# Patient Record
Sex: FEMALE | Race: WHITE | NOT HISPANIC OR LATINO | Employment: FULL TIME | URBAN - METROPOLITAN AREA
[De-identification: names, ages, dates, MRNs, and addresses within clinical notes are randomized per-mention and may not be internally consistent; named-entity substitution may affect disease eponyms.]

---

## 2019-03-20 ENCOUNTER — OFFICE VISIT (OUTPATIENT)
Dept: OBSTETRICS AND GYNECOLOGY | Facility: CLINIC | Age: 49
End: 2019-03-20
Attending: OBSTETRICS & GYNECOLOGY
Payer: COMMERCIAL

## 2019-03-20 VITALS
WEIGHT: 165.13 LBS | SYSTOLIC BLOOD PRESSURE: 112 MMHG | HEIGHT: 63 IN | DIASTOLIC BLOOD PRESSURE: 72 MMHG | BODY MASS INDEX: 29.26 KG/M2

## 2019-03-20 DIAGNOSIS — Z01.419 ENCOUNTER FOR GYNECOLOGICAL EXAMINATION WITHOUT ABNORMAL FINDING: ICD-10-CM

## 2019-03-20 DIAGNOSIS — Z12.4 SCREENING FOR MALIGNANT NEOPLASM OF CERVIX: Primary | ICD-10-CM

## 2019-03-20 PROCEDURE — 88141 LIQUID-BASED PAP SMEAR, SCREENING: ICD-10-PCS | Mod: ,,, | Performed by: PATHOLOGY

## 2019-03-20 PROCEDURE — 99386 PREV VISIT NEW AGE 40-64: CPT | Mod: S$GLB,,, | Performed by: OBSTETRICS & GYNECOLOGY

## 2019-03-20 PROCEDURE — 88175 CYTOPATH C/V AUTO FLUID REDO: CPT | Performed by: PATHOLOGY

## 2019-03-20 PROCEDURE — 87624 HPV HI-RISK TYP POOLED RSLT: CPT

## 2019-03-20 PROCEDURE — 99386 PR PREVENTIVE VISIT,NEW,40-64: ICD-10-PCS | Mod: S$GLB,,, | Performed by: OBSTETRICS & GYNECOLOGY

## 2019-03-20 PROCEDURE — 88141 CYTOPATH C/V INTERPRET: CPT | Mod: ,,, | Performed by: PATHOLOGY

## 2019-03-20 RX ORDER — NAPROXEN SODIUM 220 MG/1
81 TABLET, FILM COATED ORAL DAILY
COMMUNITY
End: 2022-05-11

## 2019-03-20 NOTE — PROGRESS NOTES
SUBJECTIVE:   49 y.o. female   for annual routine Pap and checkup. Patient's last menstrual period was 2019..  She has no unusual complaints and reports doing well since having 3 laser treatments. She is having regular cycles. She reports no hot flashes, no night sweats. .        Past Medical History:   Diagnosis Date    Abnormal Pap smear of cervix      Past Surgical History:   Procedure Laterality Date    COSMETIC SURGERY       Social History     Socioeconomic History    Marital status:      Spouse name: Not on file    Number of children: Not on file    Years of education: Not on file    Highest education level: Not on file   Social Needs    Financial resource strain: Not on file    Food insecurity - worry: Not on file    Food insecurity - inability: Not on file    Transportation needs - medical: Not on file    Transportation needs - non-medical: Not on file   Occupational History    Not on file   Tobacco Use    Smoking status: Never Smoker    Smokeless tobacco: Never Used   Substance and Sexual Activity    Alcohol use: Yes    Drug use: No    Sexual activity: Yes     Partners: Male     Birth control/protection: None   Other Topics Concern    Not on file   Social History Narrative    Not on file     Family History   Problem Relation Age of Onset    Ovarian cancer Maternal Aunt 60    Breast cancer Neg Hx     Cancer Neg Hx     Colon cancer Neg Hx      OB History    Para Term  AB Living   1 1           SAB TAB Ectopic Multiple Live Births                  # Outcome Date GA Lbr Nima/2nd Weight Sex Delivery Anes PTL Lv   1 Para                       Current Outpatient Medications   Medication Sig Dispense Refill    aspirin 81 MG Chew Take 81 mg by mouth once daily.       No current facility-administered medications for this visit.      Allergies: Patient has no known allergies.     The ASCVD Risk score (Ancelmoshara MOBLEY Jr., et al., 2013) failed to calculate for the following  reasons:    Cannot find a previous HDL lab    Cannot find a previous total cholesterol lab      ROS:  Constitutional: no weight loss, weight gain, fever, fatigue  Eyes:  No vision changes, glasses/contacts  ENT/Mouth: No ulcers, sinus problems, ears ringing, headache  Cardiovascular: No inability to lie flat, chest pain, exercise intolerance, swelling, heart palpitations  Respiratory: No wheezing, coughing blood, shortness of breath, or cough  Gastrointestinal: No diarrhea, bloody stool, nausea/vomiting, constipation, gas, hemorrhoids  Genitourinary: No blood in urine, painful urination, urgency of urination, frequency of urination, incomplete emptying, incontinence, abnormal bleeding, painful periods, heavy periods, vaginal discharge, vaginal odor, painful intercourse, sexual problems, bleeding after intercourse.  Musculoskeletal: No muscle weakness  Skin/Breast: No painful breasts, nipple discharge, masses, rash, ulcers  Neurological: No passing out, seizures, numbness, headache  Endocrine: No diabetes, hypothyroid, hyperthyroid, hot flashes, hair loss, abnormal hair growth, acne  Psychiatric: No depression, crying  Hematologic: No bruises, bleeding, swollen lymph nodes, anemia.      Physical Exam:   Constitutional: She is oriented to person, place, and time. She appears well-developed and well-nourished.      Neck: Normal range of motion. No tracheal deviation present. No thyromegaly present.    Cardiovascular: Exam reveals no edema.     Pulmonary/Chest: Effort normal. She exhibits no mass, no tenderness, no deformity and no retraction. Right breast exhibits no inverted nipple, no mass, no nipple discharge, no skin change, no tenderness, presence, no bleeding and no swelling. Left breast exhibits no inverted nipple, no mass, no nipple discharge, no skin change, no tenderness, presence, no bleeding and no swelling. Breasts are symmetrical.        Abdominal: Soft. She exhibits no distension and no mass. There is no  tenderness. There is no rebound and no guarding. No hernia. Hernia confirmed negative in the left inguinal area.     Genitourinary: Vagina normal and uterus normal. Rectal exam shows no external hemorrhoid. There is no rash, tenderness or lesion on the right labia. There is no rash, tenderness or lesion on the left labia. Uterus is not deviated. Cervix is normal. No no adexnal prolapse. Right adnexum displays no mass, no tenderness and no fullness. Left adnexum displays no mass, no tenderness and no fullness. No tenderness, bleeding, rectocele, cystocele or unspecified prolapse of vaginal walls in the vagina. No vaginal discharge found. Cervix exhibits no motion tenderness, no discharge and no friability.           Musculoskeletal: Normal range of motion and moves all extremeties. She exhibits no edema.      Lymphadenopathy:        Right: No inguinal adenopathy present.        Left: No inguinal adenopathy present.    Neurological: She is alert and oriented to person, place, and time.    Skin: No rash noted. No erythema. No pallor.    Psychiatric: She has a normal mood and affect. Her behavior is normal. Judgment and thought content normal.         ASSESSMENT:   well woman    PLAN:   mammogram  pap smear  return annually or prn

## 2019-03-25 LAB
HPV HR 12 DNA CVX QL NAA+PROBE: POSITIVE
HPV16 AG SPEC QL: POSITIVE
HPV18 DNA SPEC QL NAA+PROBE: NEGATIVE

## 2019-04-08 ENCOUNTER — PROCEDURE VISIT (OUTPATIENT)
Dept: OBSTETRICS AND GYNECOLOGY | Facility: CLINIC | Age: 49
End: 2019-04-08
Attending: OBSTETRICS & GYNECOLOGY
Payer: COMMERCIAL

## 2019-04-08 VITALS
SYSTOLIC BLOOD PRESSURE: 124 MMHG | DIASTOLIC BLOOD PRESSURE: 64 MMHG | WEIGHT: 164.69 LBS | BODY MASS INDEX: 29.18 KG/M2 | HEIGHT: 63 IN

## 2019-04-08 DIAGNOSIS — R87.810 ASCUS WITH POSITIVE HIGH RISK HPV CERVICAL: Primary | ICD-10-CM

## 2019-04-08 DIAGNOSIS — R87.610 ASCUS WITH POSITIVE HIGH RISK HPV CERVICAL: Primary | ICD-10-CM

## 2019-04-08 PROCEDURE — 57454 PR COLPOSC,CERVIX W/ADJ VAG,W/BX & CURRETAG: ICD-10-PCS | Mod: S$GLB,,, | Performed by: OBSTETRICS & GYNECOLOGY

## 2019-04-08 PROCEDURE — 88305 TISSUE SPECIMEN TO PATHOLOGY, OBSTETRICS/GYNECOLOGY: ICD-10-PCS | Mod: 26,,, | Performed by: PATHOLOGY

## 2019-04-08 PROCEDURE — 88305 TISSUE EXAM BY PATHOLOGIST: CPT | Mod: 26,,, | Performed by: PATHOLOGY

## 2019-04-08 PROCEDURE — 57454 BX/CURETT OF CERVIX W/SCOPE: CPT | Mod: S$GLB,,, | Performed by: OBSTETRICS & GYNECOLOGY

## 2019-04-08 PROCEDURE — 88305 TISSUE EXAM BY PATHOLOGIST: CPT | Performed by: PATHOLOGY

## 2019-04-09 NOTE — PROCEDURES
Colposcopy W/BIOPSY AND ECC- Today  Date/Time: 2019 11:00 AM  Performed by: Rachel Abad MD  Authorized by: Rachel Abad MD   Preparation: Patient was prepped and draped in the usual sterile fashion.  Local anesthesia used: no    Anesthesia:  Local anesthesia used: no    Sedation:  Patient sedated: no    Patient tolerance: Patient tolerated the procedure well with no immediate complications  Comments: COLPOSCOPY:    Maria Dolores Tamayo is a 49 y.o. female   presents for colposcopy.  No LMP recorded..  Her most recent pap smear shows atypical squamous cellularity of undetermined significance (ASCUS).  +HR HPV    The abnormal test findings were discussed, as well as HPV infection, need for colposcopy and possible biopsies to determine the plan of care, treatments available, the minimal risk of bleeding and infection with colposcopy, and alternatives to colposcopy and she agrees to proceed.      UPT is negative    COLPOSCOPY EXAM:   TIME OUT PERFORMED.     acetowhite lesion(s) noted at 1 o'clock    Biopsy was taken at 1 o'clock.  ECC was performed    Hemostasis was adequate with application of Monsel's solution.  The speculum was removed.  The patient did tolerate the procedure well.    All collected specimens sent to pathology for histologic analysis.    Post-colposcopy counseling:  The patient was instructed to manage post-colposcopy cramping with NSAIDs or Tylenol, or with a prescription per the medication card.  Avoid intercourse, douching, or tampons in the vagina for at least 2-3 days.  Expect a clumpy blackish discharge due to Monsel's solution application for several days.  Report heavy bleeding, worsening pain or pain that does not respond to above medications, or foul-smelling vaginal discharge. HPV vaccine recommended according to FDA age guidelines.  Importance of follow-up stressed.      Follow up based on colposcopy results.

## 2020-12-14 ENCOUNTER — OFFICE VISIT (OUTPATIENT)
Dept: OBSTETRICS AND GYNECOLOGY | Facility: CLINIC | Age: 50
End: 2020-12-14
Attending: OBSTETRICS & GYNECOLOGY
Payer: COMMERCIAL

## 2020-12-14 VITALS
DIASTOLIC BLOOD PRESSURE: 82 MMHG | HEIGHT: 63 IN | BODY MASS INDEX: 30.28 KG/M2 | WEIGHT: 170.88 LBS | SYSTOLIC BLOOD PRESSURE: 118 MMHG

## 2020-12-14 DIAGNOSIS — Z01.419 ENCOUNTER FOR GYNECOLOGICAL EXAMINATION WITHOUT ABNORMAL FINDING: ICD-10-CM

## 2020-12-14 DIAGNOSIS — Z12.31 ENCOUNTER FOR SCREENING MAMMOGRAM FOR MALIGNANT NEOPLASM OF BREAST: Primary | ICD-10-CM

## 2020-12-14 DIAGNOSIS — Z01.419 PAP SMEAR, AS PART OF ROUTINE GYNECOLOGICAL EXAMINATION: ICD-10-CM

## 2020-12-14 PROCEDURE — 1126F PR PAIN SEVERITY QUANTIFIED, NO PAIN PRESENT: ICD-10-PCS | Mod: S$GLB,,, | Performed by: OBSTETRICS & GYNECOLOGY

## 2020-12-14 PROCEDURE — 88175 CYTOPATH C/V AUTO FLUID REDO: CPT | Performed by: PATHOLOGY

## 2020-12-14 PROCEDURE — 88141 CYTOPATH C/V INTERPRET: CPT | Mod: ,,, | Performed by: PATHOLOGY

## 2020-12-14 PROCEDURE — 3008F PR BODY MASS INDEX (BMI) DOCUMENTED: ICD-10-PCS | Mod: CPTII,S$GLB,, | Performed by: OBSTETRICS & GYNECOLOGY

## 2020-12-14 PROCEDURE — 88141 PR  CYTOPATH CERV/VAG INTERPRET: ICD-10-PCS | Mod: ,,, | Performed by: PATHOLOGY

## 2020-12-14 PROCEDURE — 99396 PR PREVENTIVE VISIT,EST,40-64: ICD-10-PCS | Mod: S$GLB,,, | Performed by: OBSTETRICS & GYNECOLOGY

## 2020-12-14 PROCEDURE — 1126F AMNT PAIN NOTED NONE PRSNT: CPT | Mod: S$GLB,,, | Performed by: OBSTETRICS & GYNECOLOGY

## 2020-12-14 PROCEDURE — 99396 PREV VISIT EST AGE 40-64: CPT | Mod: S$GLB,,, | Performed by: OBSTETRICS & GYNECOLOGY

## 2020-12-14 PROCEDURE — 3008F BODY MASS INDEX DOCD: CPT | Mod: CPTII,S$GLB,, | Performed by: OBSTETRICS & GYNECOLOGY

## 2020-12-14 PROCEDURE — 87624 HPV HI-RISK TYP POOLED RSLT: CPT

## 2020-12-14 RX ORDER — SOTALOL HYDROCHLORIDE 80 MG/1
80 TABLET ORAL 2 TIMES DAILY
Status: ON HOLD | COMMUNITY
End: 2022-04-25 | Stop reason: HOSPADM

## 2020-12-14 NOTE — PROGRESS NOTES
SUBJECTIVE:   50 y.o. female   for annual routine Pap and checkup. No LMP recorded..  She has no unusual complaints.    She is seeing Cardiology for her A fib    Past Medical History:   Diagnosis Date    Abnormal Pap smear of cervix      Past Surgical History:   Procedure Laterality Date    COSMETIC SURGERY       Social History     Socioeconomic History    Marital status:      Spouse name: Not on file    Number of children: Not on file    Years of education: Not on file    Highest education level: Not on file   Occupational History    Not on file   Social Needs    Financial resource strain: Not on file    Food insecurity     Worry: Not on file     Inability: Not on file    Transportation needs     Medical: Not on file     Non-medical: Not on file   Tobacco Use    Smoking status: Never Smoker    Smokeless tobacco: Never Used   Substance and Sexual Activity    Alcohol use: Yes    Drug use: No    Sexual activity: Yes     Partners: Male     Birth control/protection: None   Lifestyle    Physical activity     Days per week: Not on file     Minutes per session: Not on file    Stress: Not on file   Relationships    Social connections     Talks on phone: Not on file     Gets together: Not on file     Attends Protestant service: Not on file     Active member of club or organization: Not on file     Attends meetings of clubs or organizations: Not on file     Relationship status: Not on file   Other Topics Concern    Not on file   Social History Narrative    Not on file     Family History   Problem Relation Age of Onset    Ovarian cancer Maternal Aunt 60    Breast cancer Neg Hx     Cancer Neg Hx     Colon cancer Neg Hx      OB History    Para Term  AB Living   1 1           SAB TAB Ectopic Multiple Live Births                  # Outcome Date GA Lbr Nima/2nd Weight Sex Delivery Anes PTL Lv   1 Para                    Current Outpatient Medications   Medication Sig Dispense Refill     sotalol (BETAPACE) 40 MG tablet Take 80 mg by mouth 2 (two) times daily.      aspirin 81 MG Chew Take 81 mg by mouth once daily.       No current facility-administered medications for this visit.      Allergies: Patient has no known allergies.     The ASCVD Risk score ([place] ITZ Colindres., et al., 2013) failed to calculate for the following reasons:    Cannot find a previous HDL lab    Cannot find a previous total cholesterol lab      ROS:  Constitutional: no weight loss, weight gain, fever, fatigue  Eyes:  No vision changes, glasses/contacts  ENT/Mouth: No ulcers, sinus problems, ears ringing, headache  Cardiovascular: No inability to lie flat, chest pain, exercise intolerance, swelling, heart palpitations  Respiratory: No wheezing, coughing blood, shortness of breath, or cough  Gastrointestinal: No diarrhea, bloody stool, nausea/vomiting, constipation, gas, hemorrhoids  Genitourinary: No blood in urine, painful urination, urgency of urination, frequency of urination, incomplete emptying, incontinence, abnormal bleeding, painful periods, heavy periods, vaginal discharge, vaginal odor, painful intercourse, sexual problems, bleeding after intercourse.  Musculoskeletal: No muscle weakness  Skin/Breast: No painful breasts, nipple discharge, masses, rash, ulcers  Neurological: No passing out, seizures, numbness, headache  Endocrine: No diabetes, hypothyroid, hyperthyroid, hot flashes, hair loss, abnormal hair growth, acne  Psychiatric: No depression, crying  Hematologic: No bruises, bleeding, swollen lymph nodes, anemia.      Physical Exam:   Constitutional: She is oriented to person, place, and time. She appears well-developed and well-nourished.      Neck: Normal range of motion. No tracheal deviation present. No thyromegaly present.    Cardiovascular: Exam reveals no edema.     Pulmonary/Chest: Effort normal. She exhibits no mass, no tenderness, no deformity and no retraction. Right breast exhibits no inverted nipple,  no mass, no nipple discharge, no skin change, no tenderness, presence, no bleeding and no swelling. Left breast exhibits no inverted nipple, no mass, no nipple discharge, no skin change, no tenderness, presence, no bleeding and no swelling. Breasts are symmetrical.        Abdominal: Soft. She exhibits no distension and no mass. There is no abdominal tenderness. There is no rebound and no guarding. No hernia. Hernia confirmed negative in the left inguinal area.     Genitourinary:    Vagina and uterus normal.   Rectum:      No external hemorrhoid.   There is no rash, tenderness or lesion on the right labia. There is no rash, tenderness or lesion on the left labia. Uterus is not deviated. Cervix is normal. No no adexnal prolapse. Right adnexum displays no mass, no tenderness and no fullness. Left adnexum displays no mass, no tenderness and no fullness. No tenderness, bleeding, rectocele, cystocele or unspecified prolapse of vaginal walls in the vagina. Cervix exhibits no motion tenderness, no discharge and no friability. negative for vaginal discharge          Musculoskeletal: Normal range of motion and moves all extremeties. No edema.       Neurological: She is alert and oriented to person, place, and time.    Skin: No rash noted. No erythema. No pallor.    Psychiatric: She has a normal mood and affect. Her behavior is normal. Judgment and thought content normal.         ASSESSMENT:   well woman  History of abnormal pap  PLAN:   Mammogram- to be done in [place]  pap smear  return annually or prn

## 2020-12-18 LAB
HPV HR 12 DNA SPEC QL NAA+PROBE: POSITIVE
HPV16 AG SPEC QL: POSITIVE
HPV18 DNA SPEC QL NAA+PROBE: NEGATIVE

## 2021-01-25 LAB
FINAL PATHOLOGIC DIAGNOSIS: ABNORMAL
Lab: ABNORMAL

## 2021-01-28 ENCOUNTER — PATIENT MESSAGE (OUTPATIENT)
Dept: OBSTETRICS AND GYNECOLOGY | Facility: CLINIC | Age: 51
End: 2021-01-28

## 2021-01-28 ENCOUNTER — PROCEDURE VISIT (OUTPATIENT)
Dept: OBSTETRICS AND GYNECOLOGY | Facility: CLINIC | Age: 51
End: 2021-01-28
Attending: OBSTETRICS & GYNECOLOGY
Payer: COMMERCIAL

## 2021-01-28 VITALS
SYSTOLIC BLOOD PRESSURE: 102 MMHG | BODY MASS INDEX: 29.1 KG/M2 | HEIGHT: 63 IN | WEIGHT: 164.25 LBS | DIASTOLIC BLOOD PRESSURE: 68 MMHG

## 2021-01-28 DIAGNOSIS — R87.612 LOW GRADE SQUAMOUS INTRAEPITHELIAL LESION (LGSIL) ON PAPANICOLAOU SMEAR OF CERVIX: Primary | ICD-10-CM

## 2021-01-28 DIAGNOSIS — B97.7 HPV (HUMAN PAPILLOMA VIRUS) INFECTION: ICD-10-CM

## 2021-01-28 PROCEDURE — 57454 BX/CURETT OF CERVIX W/SCOPE: CPT | Mod: S$GLB,,, | Performed by: OBSTETRICS & GYNECOLOGY

## 2021-01-28 PROCEDURE — 99499 NO LOS: ICD-10-PCS | Mod: S$GLB,,, | Performed by: OBSTETRICS & GYNECOLOGY

## 2021-01-28 PROCEDURE — 88305 TISSUE EXAM BY PATHOLOGIST: ICD-10-PCS | Mod: 26,,, | Performed by: PATHOLOGY

## 2021-01-28 PROCEDURE — 88305 TISSUE EXAM BY PATHOLOGIST: CPT | Performed by: PATHOLOGY

## 2021-01-28 PROCEDURE — 57454 COLPOSCOPY: ICD-10-PCS | Mod: S$GLB,,, | Performed by: OBSTETRICS & GYNECOLOGY

## 2021-01-28 PROCEDURE — 88305 TISSUE EXAM BY PATHOLOGIST: CPT | Mod: 26,,, | Performed by: PATHOLOGY

## 2021-01-28 PROCEDURE — 99499 UNLISTED E&M SERVICE: CPT | Mod: S$GLB,,, | Performed by: OBSTETRICS & GYNECOLOGY

## 2021-02-03 LAB
FINAL PATHOLOGIC DIAGNOSIS: NORMAL
GROSS: NORMAL

## 2021-02-09 ENCOUNTER — TELEPHONE (OUTPATIENT)
Dept: GYNECOLOGIC ONCOLOGY | Facility: CLINIC | Age: 51
End: 2021-02-09

## 2021-05-12 ENCOUNTER — PATIENT MESSAGE (OUTPATIENT)
Dept: RESEARCH | Facility: HOSPITAL | Age: 51
End: 2021-05-12

## 2021-10-12 ENCOUNTER — TELEPHONE (OUTPATIENT)
Dept: OBSTETRICS AND GYNECOLOGY | Facility: CLINIC | Age: 51
End: 2021-10-12

## 2021-12-15 ENCOUNTER — OFFICE VISIT (OUTPATIENT)
Dept: OBSTETRICS AND GYNECOLOGY | Facility: CLINIC | Age: 51
End: 2021-12-15
Attending: OBSTETRICS & GYNECOLOGY
Payer: COMMERCIAL

## 2021-12-15 VITALS
HEART RATE: 83 BPM | WEIGHT: 168 LBS | HEIGHT: 63 IN | DIASTOLIC BLOOD PRESSURE: 88 MMHG | SYSTOLIC BLOOD PRESSURE: 130 MMHG | BODY MASS INDEX: 29.77 KG/M2

## 2021-12-15 DIAGNOSIS — Z01.419 ENCOUNTER FOR GYNECOLOGICAL EXAMINATION WITHOUT ABNORMAL FINDING: ICD-10-CM

## 2021-12-15 DIAGNOSIS — Z12.4 SCREENING FOR MALIGNANT NEOPLASM OF THE CERVIX: Primary | ICD-10-CM

## 2021-12-15 DIAGNOSIS — Z12.31 ENCOUNTER FOR SCREENING MAMMOGRAM FOR MALIGNANT NEOPLASM OF BREAST: ICD-10-CM

## 2021-12-15 PROCEDURE — 99396 PREV VISIT EST AGE 40-64: CPT | Mod: S$GLB,,, | Performed by: OBSTETRICS & GYNECOLOGY

## 2021-12-15 PROCEDURE — 87624 HPV HI-RISK TYP POOLED RSLT: CPT | Performed by: OBSTETRICS & GYNECOLOGY

## 2021-12-15 PROCEDURE — 99396 PR PREVENTIVE VISIT,EST,40-64: ICD-10-PCS | Mod: S$GLB,,, | Performed by: OBSTETRICS & GYNECOLOGY

## 2021-12-23 LAB
CYTOLOGIST CVX/VAG CYTO: ABNORMAL
CYTOLOGY CVX/VAG DOC CYTO: ABNORMAL
CYTOLOGY CVX/VAG DOC THIN PREP: ABNORMAL
CYTOLOGY THINPREP PAP COMMENT: ABNORMAL
DX ICD CODE: ABNORMAL
HPV HR 12 DNA CVX QL NAA+PROBE: POSITIVE
HPV16 DNA CVX QL NAA+PROBE: POSITIVE
HPV18 DNA CVX QL NAA+PROBE: NEGATIVE
PAP NOTE: ABNORMAL
PATHOLOGIST CVX/VAG CYTO: ABNORMAL
STAT OF ADQ CVX/VAG CYTO-IMP: ABNORMAL

## 2022-01-03 ENCOUNTER — TELEPHONE (OUTPATIENT)
Dept: OBSTETRICS AND GYNECOLOGY | Facility: CLINIC | Age: 52
End: 2022-01-03
Payer: COMMERCIAL

## 2022-01-03 NOTE — TELEPHONE ENCOUNTER
----- Message from Homero Logan sent at 1/3/2022 12:05 PM CST -----  Regarding: Appt  Contact: SID CASTILLO [[id number]]  Name of Who is Calling: SID CASTILLO [[id number]]      What is the request in detail: Would like to speak with staff in regards to rescheduling upcoming procedure, due to appt conflict.       Can the clinic reply by [facility]: yes      What Number to Call Back if not in [facility]: [phone]

## 2022-01-10 ENCOUNTER — HOSPITAL ENCOUNTER (OUTPATIENT)
Dept: RADIOLOGY | Facility: OTHER | Age: 52
Discharge: HOME | End: 2022-01-10
Attending: OBSTETRICS & GYNECOLOGY
Payer: COMMERCIAL

## 2022-01-10 DIAGNOSIS — Z12.31 ENCOUNTER FOR SCREENING MAMMOGRAM FOR MALIGNANT NEOPLASM OF BREAST: ICD-10-CM

## 2022-01-10 PROCEDURE — 77067 SCR MAMMO BI INCL CAD: CPT | Mod: TC

## 2022-01-10 PROCEDURE — 77063 MAMMO DIGITAL SCREENING BILAT WITH TOMO: ICD-10-PCS | Mod: 26,,, | Performed by: RADIOLOGY

## 2022-01-10 PROCEDURE — 77067 MAMMO DIGITAL SCREENING BILAT WITH TOMO: ICD-10-PCS | Mod: 26,,, | Performed by: RADIOLOGY

## 2022-01-10 PROCEDURE — 77063 BREAST TOMOSYNTHESIS BI: CPT | Mod: 26,,, | Performed by: RADIOLOGY

## 2022-01-10 PROCEDURE — 77067 SCR MAMMO BI INCL CAD: CPT | Mod: 26,,, | Performed by: RADIOLOGY

## 2022-01-17 ENCOUNTER — PATIENT MESSAGE (OUTPATIENT)
Dept: OBSTETRICS AND GYNECOLOGY | Facility: CLINIC | Age: 52
End: 2022-01-17
Payer: COMMERCIAL

## 2022-01-20 ENCOUNTER — PROCEDURE VISIT (OUTPATIENT)
Dept: OBSTETRICS AND GYNECOLOGY | Facility: CLINIC | Age: 52
End: 2022-01-20
Attending: OBSTETRICS & GYNECOLOGY
Payer: COMMERCIAL

## 2022-01-20 VITALS
SYSTOLIC BLOOD PRESSURE: 116 MMHG | DIASTOLIC BLOOD PRESSURE: 72 MMHG | HEIGHT: 63 IN | BODY MASS INDEX: 29.77 KG/M2 | WEIGHT: 168 LBS

## 2022-01-20 DIAGNOSIS — R87.810 CERVICAL HIGH RISK HUMAN PAPILLOMAVIRUS (HPV) DNA TEST POSITIVE: ICD-10-CM

## 2022-01-20 DIAGNOSIS — R87.612 LGSIL ON PAP SMEAR OF CERVIX: Primary | ICD-10-CM

## 2022-01-20 LAB
B-HCG UR QL: NEGATIVE
CTP QC/QA: YES

## 2022-01-20 PROCEDURE — 88305 TISSUE EXAM BY PATHOLOGIST: CPT | Mod: 26,,, | Performed by: PATHOLOGY

## 2022-01-20 PROCEDURE — 81025 URINE PREGNANCY TEST: CPT | Mod: S$GLB,,, | Performed by: OBSTETRICS & GYNECOLOGY

## 2022-01-20 PROCEDURE — 88305 TISSUE EXAM BY PATHOLOGIST: ICD-10-PCS | Mod: 26,,, | Performed by: PATHOLOGY

## 2022-01-20 PROCEDURE — 57454 BX/CURETT OF CERVIX W/SCOPE: CPT | Mod: S$GLB,,, | Performed by: OBSTETRICS & GYNECOLOGY

## 2022-01-20 PROCEDURE — 81025 POCT URINE PREGNANCY: ICD-10-PCS | Mod: S$GLB,,, | Performed by: OBSTETRICS & GYNECOLOGY

## 2022-01-20 PROCEDURE — 57454 COLPOSCOPY W/BIOPSY AND ECC- TODAY: ICD-10-PCS | Mod: S$GLB,,, | Performed by: OBSTETRICS & GYNECOLOGY

## 2022-01-20 PROCEDURE — 88305 TISSUE EXAM BY PATHOLOGIST: CPT | Mod: 59 | Performed by: PATHOLOGY

## 2022-01-20 NOTE — PROCEDURES
Colposcopy W/BIOPSY AND ECC- Today    Date/Time: 1/20/2022 9:00 AM  Performed by: Rachel Abad MD  Authorized by: Rachel Abad MD     Consent Done?:  Yes (Written)  Timeout:Immediately prior to procedure a time out was called to verify the correct patient, procedure, equipment, support staff and site/side marked as required  Prep:Patient was prepped and draped in the usual sterile fashion  Assistants?: No      Colposcopy Site:  Cervix  Position:  Supine  Acrowhite Lesion? Yes    Atypical Vessels: No    Transformation Zone Adequate?: No    Biopsy?: Yes         Location:  Cervix ((10 00, 11 00 and 12 00))  ECC Performed?: Yes    LEEP Performed?: No    Estimated blood loss (cc):  5   Patient tolerated the procedure well with no immediate complications.

## 2022-01-27 LAB
FINAL PATHOLOGIC DIAGNOSIS: NORMAL
Lab: NORMAL

## 2022-04-21 ENCOUNTER — HOSPITAL ENCOUNTER (OUTPATIENT)
Dept: RADIOLOGY | Facility: HOSPITAL | Age: 52
Discharge: HOME | End: 2022-04-21
Attending: FAMILY MEDICINE
Payer: COMMERCIAL

## 2022-04-21 PROCEDURE — 71045 X-RAY EXAM CHEST 1 VIEW: CPT | Mod: 26,,, | Performed by: RADIOLOGY

## 2022-04-21 PROCEDURE — 71045 XR CHEST 1 VIEW: ICD-10-PCS | Mod: 26,,, | Performed by: RADIOLOGY

## 2022-04-22 ENCOUNTER — HOSPITAL ENCOUNTER (INPATIENT)
Facility: HOSPITAL | Age: 52
LOS: 3 days | Discharge: HOME HEALTH | DRG: 872 | End: 2022-04-25
Attending: EMERGENCY MEDICINE | Admitting: INTERNAL MEDICINE
Payer: COMMERCIAL

## 2022-04-22 ENCOUNTER — TELEPHONE (OUTPATIENT)
Dept: NEUROLOGY | Facility: CLINIC | Age: 52
End: 2022-04-22
Payer: COMMERCIAL

## 2022-04-22 DIAGNOSIS — I48.91 ATRIAL FIBRILLATION WITH RAPID VENTRICULAR RESPONSE: ICD-10-CM

## 2022-04-22 DIAGNOSIS — R06.02 SHORTNESS OF BREATH: ICD-10-CM

## 2022-04-22 DIAGNOSIS — I48.91 ATRIAL FIBRILLATION WITH RVR: ICD-10-CM

## 2022-04-22 DIAGNOSIS — K57.92 DIVERTICULITIS: ICD-10-CM

## 2022-04-22 DIAGNOSIS — R00.0 TACHYCARDIA: Primary | ICD-10-CM

## 2022-04-22 PROBLEM — I50.22 CHRONIC SYSTOLIC HEART FAILURE: Status: ACTIVE | Noted: 2022-04-22

## 2022-04-22 LAB
ALBUMIN SERPL BCP-MCNC: 3.4 G/DL (ref 3.5–5.2)
ALP SERPL-CCNC: 249 U/L (ref 55–135)
ALT SERPL W/O P-5'-P-CCNC: 66 U/L (ref 10–44)
ANION GAP SERPL CALC-SCNC: 15 MMOL/L (ref 8–16)
AST SERPL-CCNC: 44 U/L (ref 10–40)
B-HCG UR QL: NEGATIVE
BASOPHILS # BLD AUTO: 0.09 K/UL (ref 0–0.2)
BASOPHILS NFR BLD: 0.3 % (ref 0–1.9)
BILIRUB SERPL-MCNC: 0.8 MG/DL (ref 0.1–1)
BILIRUB UR QL STRIP: NEGATIVE
BILIRUB UR QL STRIP: NEGATIVE
BNP SERPL-MCNC: 96 PG/ML (ref 0–99)
BUN SERPL-MCNC: 22 MG/DL (ref 6–20)
CALCIUM SERPL-MCNC: 10.4 MG/DL (ref 8.7–10.5)
CHLORIDE SERPL-SCNC: 101 MMOL/L (ref 95–110)
CLARITY UR REFRACT.AUTO: ABNORMAL
CLARITY UR REFRACT.AUTO: CLEAR
CO2 SERPL-SCNC: 20 MMOL/L (ref 23–29)
COLOR UR AUTO: YELLOW
COLOR UR AUTO: YELLOW
CREAT SERPL-MCNC: 0.8 MG/DL (ref 0.5–1.4)
CTP QC/QA: YES
DIFFERENTIAL METHOD: ABNORMAL
EOSINOPHIL # BLD AUTO: 0.1 K/UL (ref 0–0.5)
EOSINOPHIL NFR BLD: 0.4 % (ref 0–8)
ERYTHROCYTE [DISTWIDTH] IN BLOOD BY AUTOMATED COUNT: 14.6 % (ref 11.5–14.5)
EST. GFR  (AFRICAN AMERICAN): >60 ML/MIN/1.73 M^2
EST. GFR  (NON AFRICAN AMERICAN): >60 ML/MIN/1.73 M^2
GLUCOSE SERPL-MCNC: 92 MG/DL (ref 70–110)
GLUCOSE UR QL STRIP: NEGATIVE
GLUCOSE UR QL STRIP: NEGATIVE
HCT VFR BLD AUTO: 41.7 % (ref 37–48.5)
HGB BLD-MCNC: 13.4 G/DL (ref 12–16)
HGB UR QL STRIP: NEGATIVE
HGB UR QL STRIP: NEGATIVE
IMM GRANULOCYTES # BLD AUTO: 0.26 K/UL (ref 0–0.04)
IMM GRANULOCYTES NFR BLD AUTO: 1 % (ref 0–0.5)
KETONES UR QL STRIP: ABNORMAL
KETONES UR QL STRIP: ABNORMAL
LACTATE SERPL-SCNC: 0.9 MMOL/L (ref 0.5–2.2)
LEUKOCYTE ESTERASE UR QL STRIP: NEGATIVE
LEUKOCYTE ESTERASE UR QL STRIP: NEGATIVE
LYMPHOCYTES # BLD AUTO: 1.7 K/UL (ref 1–4.8)
LYMPHOCYTES NFR BLD: 6.5 % (ref 18–48)
MAGNESIUM SERPL-MCNC: 2 MG/DL (ref 1.6–2.6)
MCH RBC QN AUTO: 28.5 PG (ref 27–31)
MCHC RBC AUTO-ENTMCNC: 32.1 G/DL (ref 32–36)
MCV RBC AUTO: 89 FL (ref 82–98)
MONOCYTES # BLD AUTO: 1.7 K/UL (ref 0.3–1)
MONOCYTES NFR BLD: 6.6 % (ref 4–15)
NEUTROPHILS # BLD AUTO: 22.4 K/UL (ref 1.8–7.7)
NEUTROPHILS NFR BLD: 85.2 % (ref 38–73)
NITRITE UR QL STRIP: NEGATIVE
NITRITE UR QL STRIP: NEGATIVE
NRBC BLD-RTO: 0 /100 WBC
PH UR STRIP: 5 [PH] (ref 5–8)
PH UR STRIP: 5 [PH] (ref 5–8)
PHOSPHATE SERPL-MCNC: 3.3 MG/DL (ref 2.7–4.5)
PLATELET # BLD AUTO: 383 K/UL (ref 150–450)
PMV BLD AUTO: 12.4 FL (ref 9.2–12.9)
POC MOLECULAR INFLUENZA A AGN: NEGATIVE
POC MOLECULAR INFLUENZA B AGN: NEGATIVE
POTASSIUM SERPL-SCNC: 4.4 MMOL/L (ref 3.5–5.1)
PROT SERPL-MCNC: 8.4 G/DL (ref 6–8.4)
PROT UR QL STRIP: NEGATIVE
PROT UR QL STRIP: NEGATIVE
RBC # BLD AUTO: 4.7 M/UL (ref 4–5.4)
SARS-COV-2 RDRP RESP QL NAA+PROBE: NEGATIVE
SARS-COV-2 RDRP RESP QL NAA+PROBE: NEGATIVE
SODIUM SERPL-SCNC: 136 MMOL/L (ref 136–145)
SP GR UR STRIP: 1.02 (ref 1–1.03)
SP GR UR STRIP: 1.02 (ref 1–1.03)
TROPONIN I SERPL DL<=0.01 NG/ML-MCNC: 0.03 NG/ML (ref 0–0.03)
TROPONIN I SERPL DL<=0.01 NG/ML-MCNC: 0.04 NG/ML (ref 0–0.03)
TSH SERPL DL<=0.005 MIU/L-ACNC: 2.02 UIU/ML (ref 0.4–4)
URN SPEC COLLECT METH UR: ABNORMAL
URN SPEC COLLECT METH UR: ABNORMAL
WBC # BLD AUTO: 26.26 K/UL (ref 3.9–12.7)

## 2022-04-22 PROCEDURE — 25000003 PHARM REV CODE 250: Performed by: INTERNAL MEDICINE

## 2022-04-22 PROCEDURE — 83735 ASSAY OF MAGNESIUM: CPT | Performed by: INTERNAL MEDICINE

## 2022-04-22 PROCEDURE — 99233 SBSQ HOSP IP/OBS HIGH 50: CPT | Mod: ,,, | Performed by: PHYSICAL MEDICINE & REHABILITATION

## 2022-04-22 PROCEDURE — 99233 PR SUBSEQUENT HOSPITAL CARE,LEVL III: ICD-10-PCS | Mod: ,,, | Performed by: NURSE PRACTITIONER

## 2022-04-22 PROCEDURE — 87502 INFLUENZA DNA AMP PROBE: CPT

## 2022-04-22 PROCEDURE — 81003 URINALYSIS AUTO W/O SCOPE: CPT | Mod: 91 | Performed by: INTERNAL MEDICINE

## 2022-04-22 PROCEDURE — U0002 COVID-19 LAB TEST NON-CDC: HCPCS | Performed by: INTERNAL MEDICINE

## 2022-04-22 PROCEDURE — 20000000 HC ICU ROOM

## 2022-04-22 PROCEDURE — 87040 BLOOD CULTURE FOR BACTERIA: CPT | Performed by: INTERNAL MEDICINE

## 2022-04-22 PROCEDURE — 84100 ASSAY OF PHOSPHORUS: CPT | Performed by: INTERNAL MEDICINE

## 2022-04-22 PROCEDURE — 96375 TX/PRO/DX INJ NEW DRUG ADDON: CPT

## 2022-04-22 PROCEDURE — 63600175 PHARM REV CODE 636 W HCPCS: Performed by: INTERNAL MEDICINE

## 2022-04-22 PROCEDURE — 93010 ELECTROCARDIOGRAM REPORT: CPT | Mod: ,,, | Performed by: INTERNAL MEDICINE

## 2022-04-22 PROCEDURE — 96365 THER/PROPH/DIAG IV INF INIT: CPT

## 2022-04-22 PROCEDURE — 99233 PR SUBSEQUENT HOSPITAL CARE,LEVL III: ICD-10-PCS | Mod: ,,, | Performed by: PHYSICAL MEDICINE & REHABILITATION

## 2022-04-22 PROCEDURE — 99291 CRITICAL CARE FIRST HOUR: CPT | Mod: 25

## 2022-04-22 PROCEDURE — 81025 URINE PREGNANCY TEST: CPT | Performed by: INTERNAL MEDICINE

## 2022-04-22 PROCEDURE — 99291 CRITICAL CARE FIRST HOUR: CPT | Mod: CS,,, | Performed by: EMERGENCY MEDICINE

## 2022-04-22 PROCEDURE — 83605 ASSAY OF LACTIC ACID: CPT | Performed by: INTERNAL MEDICINE

## 2022-04-22 PROCEDURE — 84443 ASSAY THYROID STIM HORMONE: CPT | Performed by: INTERNAL MEDICINE

## 2022-04-22 PROCEDURE — 80053 COMPREHEN METABOLIC PANEL: CPT | Performed by: INTERNAL MEDICINE

## 2022-04-22 PROCEDURE — 96361 HYDRATE IV INFUSION ADD-ON: CPT

## 2022-04-22 PROCEDURE — 25500020 PHARM REV CODE 255: Performed by: EMERGENCY MEDICINE

## 2022-04-22 PROCEDURE — 99291 PR CRITICAL CARE, E/M 30-74 MINUTES: ICD-10-PCS | Mod: CS,,, | Performed by: EMERGENCY MEDICINE

## 2022-04-22 PROCEDURE — 99233 SBSQ HOSP IP/OBS HIGH 50: CPT | Mod: ,,, | Performed by: NURSE PRACTITIONER

## 2022-04-22 PROCEDURE — 83880 ASSAY OF NATRIURETIC PEPTIDE: CPT | Performed by: INTERNAL MEDICINE

## 2022-04-22 PROCEDURE — 85025 COMPLETE CBC W/AUTO DIFF WBC: CPT | Performed by: INTERNAL MEDICINE

## 2022-04-22 PROCEDURE — 93005 ELECTROCARDIOGRAM TRACING: CPT

## 2022-04-22 PROCEDURE — 84484 ASSAY OF TROPONIN QUANT: CPT | Mod: 91 | Performed by: INTERNAL MEDICINE

## 2022-04-22 PROCEDURE — 93010 EKG 12-LEAD: ICD-10-PCS | Mod: ,,, | Performed by: INTERNAL MEDICINE

## 2022-04-22 PROCEDURE — 25000003 PHARM REV CODE 250

## 2022-04-22 RX ORDER — SODIUM CHLORIDE 0.9 % (FLUSH) 0.9 %
10 SYRINGE (ML) INJECTION
Status: DISCONTINUED | OUTPATIENT
Start: 2022-04-22 | End: 2022-04-25 | Stop reason: HOSPADM

## 2022-04-22 RX ORDER — METOPROLOL TARTRATE 1 MG/ML
INJECTION, SOLUTION INTRAVENOUS
Status: COMPLETED
Start: 2022-04-22 | End: 2022-04-22

## 2022-04-22 RX ORDER — NAPROXEN SODIUM 220 MG/1
81 TABLET, FILM COATED ORAL DAILY
Status: DISCONTINUED | OUTPATIENT
Start: 2022-04-23 | End: 2022-04-23

## 2022-04-22 RX ORDER — VANCOMYCIN HCL IN 5 % DEXTROSE 1G/250ML
15 PLASTIC BAG, INJECTION (ML) INTRAVENOUS
Status: DISCONTINUED | OUTPATIENT
Start: 2022-04-23 | End: 2022-04-23

## 2022-04-22 RX ORDER — ACETAMINOPHEN 325 MG/1
650 TABLET ORAL EVERY 6 HOURS PRN
Status: DISCONTINUED | OUTPATIENT
Start: 2022-04-23 | End: 2022-04-25 | Stop reason: HOSPADM

## 2022-04-22 RX ORDER — METOPROLOL TARTRATE 1 MG/ML
5 INJECTION, SOLUTION INTRAVENOUS EVERY 5 MIN PRN
Status: COMPLETED | OUTPATIENT
Start: 2022-04-22 | End: 2022-04-22

## 2022-04-22 RX ORDER — MORPHINE SULFATE 2 MG/ML
2 INJECTION, SOLUTION INTRAMUSCULAR; INTRAVENOUS EVERY 4 HOURS PRN
Status: DISCONTINUED | OUTPATIENT
Start: 2022-04-23 | End: 2022-04-25 | Stop reason: HOSPADM

## 2022-04-22 RX ADMIN — METOROPROLOL TARTRATE 5 MG: 5 INJECTION, SOLUTION INTRAVENOUS at 05:04

## 2022-04-22 RX ADMIN — PIPERACILLIN SODIUM AND TAZOBACTAM SODIUM 4.5 G: 4; .5 INJECTION, POWDER, FOR SOLUTION INTRAVENOUS at 06:04

## 2022-04-22 RX ADMIN — IOHEXOL 100 ML: 350 INJECTION, SOLUTION INTRAVENOUS at 08:04

## 2022-04-22 RX ADMIN — SODIUM CHLORIDE 250 ML: 0.9 INJECTION, SOLUTION INTRAVENOUS at 05:04

## 2022-04-22 RX ADMIN — SODIUM CHLORIDE 250 ML: 0.9 INJECTION, SOLUTION INTRAVENOUS at 08:04

## 2022-04-22 NOTE — HPI
Maria Dolores Tamayo is a 52 y.o. female with paroxsymal AF (diagnosed 13 years ago) along with HF with recovered EF who had a recent admission for CVA in [place]. 2 days following her CVA, she had an in-hospital VT cardiac arrest. Echo at the time reportedly showed EF 25%. They tried to place an ICD but reportedly were unable to pass leads through to heart, so she was given a lifevest and discharged to a rehab facility in [place]. She presents today because she was alerted by her Lifevest about an impending shock, which she was able to cancel. Pt reports that she was not doing any activity in particular and was in her normal state of health in the rehab facility. I was later notified by ER that nursing home MD reported recent fevers and leukocytosis.     EMS notified and found her HR to be close to 200. She was brought to ER and given metoprolol 5 mg iv x 3 doses with improvement in HR to 110-140. Pt again denying any symptoms. Our labs notable for a WBC of 26k. Cardiology consulted for AF with RVR    The left ventricle is normal in size with eccentric hypertrophy and normal systolic function. The estimated ejection fraction is 55%.  Normal right ventricular size with normal right ventricular systolic function.  Mild left atrial enlargement.  Normal central venous pressure (3 mmHg).  Atrial fibrillation present throughout the examination.      Vent. Rate : 089 BPM     Atrial Rate : 080 BPM      P-R Int : 142 ms          QRS Dur : 082 ms       QT Int : 354 ms       P-R-T Axes : 053 032 042 degrees      QTc Int : 430 ms     NSR with PACs and PVCs   Low anterior forces

## 2022-04-22 NOTE — ED PROVIDER NOTES
Encounter date: 4:49 PM 04/22/2022    Source of History   Patient     Chief Complaint   Pt presents with:   Tachycardia (Pt in afib RVR with a rate of 200. Pt has a hx afib. On a life vest.)      History Of Present Illness   Maria Dolores Tamayo is a 52 y.o. female with Afib, CHF w/ EF of 25% with life vest, recent stroke, Dilated cardiomyopathy, HTN, hx of Vfib, history of diverticulosis who presents to the ED with CC of tachycardia and life vest alarming 30 mins  PTA. Pt is at rehab hospital following a recent stroke in [place]. While in the hospital she was admitted to the ICU and intubated. They tried to place a cardiac defibulator but were unsuccessful so she is wearing a life vest. She states that she has been doing well there but the life vest went off 30 mins PTA and nursed noted that she was tachycardic. She denies chest pain, shortness of breath, or blood in bowel movements. She has not had an fever. She notes intermittent speech finding difficulties, chronic.      Denies: chest pain, shortness of breath, n/v/d  This is the extent to the patients complaints today here in the emergency department.    Review Of Systems   As per HPI and below:  Positive for:  Life vest alarming and tachycardia   Constitutional: No fever.   HEENT: No voice change. No sore throat .    Eyes:  No visual disturbances.   Respiratory:  No shortness of breath. No wheezing. No cough.   Cardio:  No chest pain. No leg swelling. No palpitations.   GI:  No abdominal pain. No nausea or vomiting. No diarrhea.   :  No blood in urine. No difficulty urinating.   MSK:  No back pain. No neck pain.   Skin: No rash.   Neurologic: No headache. No dizziness.       Review of patient's allergies indicates:  No Known Allergies    Current Facility-Administered Medications on File Prior to Encounter   Medication Dose Route Frequency Provider Last Rate Last Admin    [DISCONTINUED] ciprofloxacin HCl tablet  500 mg Oral  Generic External Data Provider         [DISCONTINUED] metroNIDAZOLE tablet  500 mg Oral  Generic External Data Provider         Current Outpatient Medications on File Prior to Encounter   Medication Sig Dispense Refill    aspirin 81 MG Chew Take 81 mg by mouth once daily.      COVID-19 vacc,mRNA,Moderna,/PF (MODERNA COVID-19 VACCINE, EUA, IM)       sotalol (BETAPACE) 40 MG tablet Take 80 mg by mouth 2 (two) times daily.         Past History   As per HPI and below:  Past Medical History:   Diagnosis Date    Abnormal Pap smear of cervix      Past Surgical History:   Procedure Laterality Date    AUGMENTATION OF BREAST      COSMETIC SURGERY         Social History     Socioeconomic History    Marital status:    Tobacco Use    Smoking status: Never Smoker    Smokeless tobacco: Never Used   Substance and Sexual Activity    Alcohol use: Yes    Drug use: No    Sexual activity: Yes     Partners: Male     Birth control/protection: None       Family History   Problem Relation Age of Onset    Ovarian cancer Maternal Aunt 60    Breast cancer Neg Hx     Cancer Neg Hx     Colon cancer Neg Hx        Physical Exam     Vitals:    04/22/22 2015 04/22/22 2115 04/22/22 2145 04/22/22 2215   BP: 126/61 (!) 143/66  (!) 146/64   Pulse: (!) 146 (!) 166  (!) 171   Resp: (!) 28 (!) 29  (!) 21   Temp:       TempSrc:       SpO2: 98% 98%  96%   Weight:   73.5 kg (162 lb 0.6 oz)      Physical Exam:   Nursing note and vitals reviewed.  Appearance: well appearing adult  female in no acute respiratory distress with life vest in place.  Making purposeful movements.  Speaking in full sentences.  Skin: No rashes seen.  Good turgor.  No abrasions.  No ecchymoses.  Eyes: No conjunctival injection. EOMI, PERRL.  Head: NC/AT  ENT: Oropharynx clear.    Chest: CTAB. No increased work of breathing, bilateral chest rise.Cardiovascular: Tachycardic with irregular irregular rhythm.  Normal equal bilateral radial pulses.  No JVD.    No lower extremity edema.   Abdomen: Soft.  Not  distended.  Nontender.  No guarding.  No rebound. No Masses  Musculoskeletal: Good range of motion all joints.  No deformities.  Neck supple, full range of motion, no obvious deformity.  Neurologic: Moves all extremities.  Normal sensation.  No facial droop.  Normal speech.    Mental Status:  Alert and oriented x 3.  Appropriate, conversant.      Results and Medications    Procedures    Labs Reviewed   CBC W/ AUTO DIFFERENTIAL - Abnormal; Notable for the following components:       Result Value    WBC 26.26 (*)     RDW 14.6 (*)     Immature Granulocytes 1.0 (*)     Gran # (ANC) 22.4 (*)     Immature Grans (Abs) 0.26 (*)     Mono # 1.7 (*)     Gran % 85.2 (*)     Lymph % 6.5 (*)     All other components within normal limits   COMPREHENSIVE METABOLIC PANEL - Abnormal; Notable for the following components:    CO2 20 (*)     BUN 22 (*)     Albumin 3.4 (*)     Alkaline Phosphatase 249 (*)     AST 44 (*)     ALT 66 (*)     All other components within normal limits   TROPONIN I - Abnormal; Notable for the following components:    Troponin I 0.034 (*)     All other components within normal limits   TROPONIN I - Abnormal; Notable for the following components:    Troponin I 0.037 (*)     All other components within normal limits   URINALYSIS, REFLEX TO URINE CULTURE - Abnormal; Notable for the following components:    Ketones, UA Trace (*)     All other components within normal limits    Narrative:     Specimen Source->Urine   URINALYSIS, REFLEX TO URINE CULTURE - Abnormal; Notable for the following components:    Appearance, UA Hazy (*)     Ketones, UA Trace (*)     All other components within normal limits    Narrative:     Specimen Source->Urine   CULTURE, BLOOD   CULTURE, BLOOD   B-TYPE NATRIURETIC PEPTIDE   MAGNESIUM   PHOSPHORUS   TSH   LACTIC ACID, PLASMA   SARS-COV-2 RDRP GENE   POCT INFLUENZA A/B MOLECULAR   SARS-COV-2 RDRP GENE    Narrative:     This test utilizes isothermal nucleic acid amplification   technology  to detect the SARS-CoV-2 RdRp nucleic acid segment.   The analytical sensitivity (limit of detection) is 125 genome   equivalents/mL.   A POSITIVE result implies infection with the SARS-CoV-2 virus;   the patient is presumed to be contagious.     A NEGATIVE result means that SARS-CoV-2 nucleic acids are not   present above the limit of detection. A NEGATIVE result should be   treated as presumptive. It does not rule out the possibility of   COVID-19 and should not be the sole basis for treatment decisions.   If COVID-19 is strongly suspected based on clinical and exposure   history, re-testing using an alternate molecular assay should be   considered.   This test is only for use under the Food and Drug   Administration s Emergency Use Authorization (EUA).   Commercial kits are provided by [employer].   Performance characteristics of the EUA have been independently   verified by [facility] Department of   Pathology and Laboratory Medicine.   _________________________________________________________________   The authorized Fact Sheet for Healthcare Providers and the authorized Fact   Sheet for Patients of the ID NOW COVID-19 are available on the FDA   website:     https://www.fda.gov/media/766195/download  https://www.fda.gov/media/028461/download          POCT URINE PREGNANCY       Imaging Results           CT Abdomen Pelvis With Contrast (Final result)  Result time 04/22/22 21:42:05    Final result by Antonio Ramos MD (04/22/22 21:42:05)                 Impression:      Findings consistent with sigmoid diverticulitis.  No evidence of abscess or free intraperitoneal air.  Small volume of free fluid in the pelvis.    Cholelithiasis without cholecystitis.    Hepatomegaly.    Enlarged uterus with lobular contour and presence of heterogeneous masses consistent with uterine fibroids.  Largest fibroid in the right fundus, approximately 7 cm, appears to be centrally necrotic.    This report was  flagged in Epic as abnormal.    Electronically signed by resident: Socorro Gonzalez  Date:    04/22/2022  Time:    20:43    Electronically signed by: Antonio Ramos MD  Date:    04/22/2022  Time:    21:42             Narrative:    EXAMINATION:  CT ABDOMEN PELVIS WITH CONTRAST    CLINICAL HISTORY:  Abdominal abscess/infection suspected;    TECHNIQUE:  Low dose axial images, sagittal and coronal reformations were obtained from the lung bases to the pubic symphysis following the IV administration of 100 mL of Omnipaque 350.  Oral contrast was not administered.    COMPARISON:  Chest radiograph 04/22/2022.    FINDINGS:  Heart: No cardiomegaly or pericardial effusion.    Lung Bases: Bilateral lung fields are clear with no consolidation, pneumothorax, or pleural effusion.    Liver: Hepatomegaly.  No focal hepatic lesions.    Gallbladder: Few calcified gallstones.  No pericholecystic fluid or gallbladder wall thickening.    Bile Ducts: No dilatation.    Pancreas: No mass. No peripancreatic fat stranding.    Spleen: Unremarkable    Adrenals: Unremarkable    Kidneys/Ureters: Normal enhancement.  No mass or  hydroureteronephrosis.    Bladder: No wall thickening.    Reproductive organs: Lobular contour of the uterus with heterogeneous masses consistent with uterine fibroids.  Largest fibroid in the right fundus, approximately 7 cm, appears to be centrally necrotic.    GI Tract/Mesentery: Scattered colonic diverticula with significant surrounding inflammatory change about the sigmoid colon with colonic wall thickening.  There is associated bowel wall edema in some the adjacent loops of small bowel, likely due to acute diverticulitis.  No discrete fluid collection or free intraperitoneal air.  No evidence of bowel obstruction.  Appendix is unremarkable.    Peritoneal Space: Significant mesenteric stranding about the lower abdomen and pelvis with small volume free fluid.  No free intraperitoneal air.    Retroperitoneum: No  significant adenopathy.    Abdominal wall: Unremarkable.    Vasculature: No aneurysm or significant atherosclerosis.    Bones: No acute fracture. No suspicious lytic or sclerotic lesions.                               X-Ray Chest AP Portable (Final result)  Result time 04/22/22 19:04:43    Final result by Reno Jurado MD (04/22/22 19:04:43)                 Impression:      1. No significant change in cardiopulmonary status.  2. No acute abnormality.      Electronically signed by: Reno Jurado  Date:    04/22/2022  Time:    19:04             Narrative:    EXAMINATION:  XR CHEST AP PORTABLE    CLINICAL HISTORY:  CHF;    TECHNIQUE:  Single frontal view of the chest was performed.    COMPARISON:  04/21/2022    FINDINGS:  Cardiac silhouette is upper normal size, similar to prior study.    Multiple electronic devices overlying the heart and mediastinum, similar to the prior study.    No effusion or pneumothorax.    No mass or consolidation.    Lungs are clear.    No significant change.                                    Medications   vancomycin 2 g in dextrose 5 % 500 mL IVPB (has no administration in time range)   sodium chloride 0.9% flush 10 mL (has no administration in time range)   vancomycin - pharmacy to dose (has no administration in time range)   piperacillin-tazobactam 4.5 g in sodium chloride 0.9% 100 mL IVPB (ready to mix system) (has no administration in time range)   aspirin chewable tablet 81 mg (has no administration in time range)   apixaban tablet 5 mg (has no administration in time range)   vancomycin in dextrose 5 % 1 gram/250 mL IVPB 1,000 mg (1,000 mg Intravenous Trough Due As Scheduled Before Dose 4/24/22 1100)   metoprolol injection 5 mg (5 mg Intravenous Given 4/22/22 1719)   sodium chloride 0.9% bolus 250 mL (0 mLs Intravenous Stopped 4/22/22 1820)   piperacillin-tazobactam 4.5 g in sodium chloride 0.9% 100 mL IVPB (ready to mix system) (0 g Intravenous Stopped 4/22/22 1921)   sodium  chloride 0.9% bolus 250 mL (0 mLs Intravenous Stopped 4/22/22 2101)   iohexoL (OMNIPAQUE 350) injection 100 mL (100 mLs Intravenous Given 4/22/22 2040)       MDM, Impression and Plan   Previous Records:  I decided to obtain old medical records which showed:  Outside records were reviewed in patient recently had a stroke and was noted to have an ejection fraction of 25%.  She has a life vest.    Initial Assessment:   Urgent evaluation of 52 y.o. female with history as above who presents the ED with chief complaint of tachycardia after her LifeVest fired.  On arrival to the ED she is noted to be afebrile, tachycardic to 179 with a soft blood pressure 106/65 in no acute respiratory distress.  Differential Diagnosis:   -COVID  -ACS-unlikely   -pregnancy  -UTI  -influenza  -electrolyte abnormalities  -anemia  -heart failure-unlikely with no JVD and clear lung sounds  -diverticulitis  Independently Interpreted Test(s):   EKG:  I independently reviewed and interpreted the EKG and my findings are as follows:   Please see ED course.  EKG shows AFib with RVR and ventricular rate of 206 beats per minute.    IMAGING:  I have ordered and independently interpreted X-rays and my findings are as follow:  Please see ED course.  Chest x-ray with no pneumonia pneumothorax or pleural effusion.    Clinical Tests:   Lab Tests: Ordered and Reviewed  Radiological Study: Ordered and Reviewed  Medical Tests: Ordered and Reviewed    ED Management:    On arrival patient denied any infectious symptoms.  She was noted to be in AFib with RVR was given a 250 cc bolus as well as 5 mg of metoprolol x3 with improvement in her tachycardia. Rate improved to 130 prior to admission.  Cardiology was consulted due to cardiac history with life vest.  Prior to Cardiology evaluation received secure message from the doctor at the nursing facility who noted concern for infectious etiology thus and infectious workup was started.  COVID negative.  Urinalysis  without signs UTI.  Urine pregnancy negative.  Flu negative.  Blood cultures were ordered and she was given vanc and Zosyn.  She was not given a 30 cc/kg fluid bolus due to her stable blood pressure and concern for volume overload in the setting of severely reduced EF.  No gross electrolyte abnormalities appreciated.  Mag and phos within normal limits.  TSH within normal limits making thyroid abnormalities unlikely.  She is noted to have a leukocytosis to 26 and her hemoglobin is noted to be 13. Due to history of diverticulosis CT abdomen pelvis was ordered which shows findings consistent with sigmoid diverticulitis with no abscess or free air in abdomen.  She has noted elevation of liver enzymes and alk-phos which will need to be trended inpatient.  I called and discussed the case with Critical Care Medicine who was agreeable with admission and they state that they believe that her heart rate is lower than recorded on EKG and monitoring but will admit her due to her high likelihood of decompensation.  Patient updated on plans for admission to critical care medicine.  Please see ED course for discussion of labs.     I called and discussed the case with: Critical Care medicine/ Cardiology          Final diagnoses:  [R06.02] Shortness of breath  [R00.0] Tachycardia (Primary)  [K57.92] Diverticulitis  [I48.91] Atrial fibrillation with RVR          ED Disposition Condition    Admit              ED Course as of 04/22/22 2312 Fri Apr 22, 2022 1716 Spoke with cardiology who agrees the patient [HM]   1803 Recieved message from MD at [facility] MD who notes concern for infectious etiology with fever to 101, leukocytosis, cough for 2 days and they order COVID test was pending.  With this new found information shows concern for infectious etiology will hold further beta-blockers.  Will begin workup for infectious etiology [HM]   1823 WBC(!): 26.26  Leukocytosis  [AB]   2034 Medical ICU to speak to cardiac ICU  pending  admission based on their discussion.  []   2111 Spoke with Medical ICU fellow who states she evaluated patient and will speak to CCU fellow about admission. []      ED Course User Index  [AB] Parker Lares MD  [] Fer Aldana MD           Attending Attestation:   Physician Attestation Statement for Resident:  As the supervising MD   Physician Attestation Statement: I have personally seen and examined this patient.   I agree with the above history. -: 52-year-old female presenting with life vest alarm.  Denies chest pain.  Recent stroke and ICU admission.  Reportedly febrile at rehab facility earlier.   As the supervising MD I agree with the above PE.   -: No acute distress.  Irregular irregular rhythm, tachycardia  Lungs clear  No JVD  Life vest in place.   As the supervising MD I agree with the above treatment, course, plan, and disposition.   -: EKG on my independent interpretation:  No STEMI, rate 206, AFib with RVR, NSSTWA      Labs concerning for leukocytosis, added sepsis protocol and broad-spectrum antibiotics.  Appreciate cardiology evaluation - recommend admission to medicine service.  CT w/ diverticulitis.  HR improved with metoprolol, although holding on additional beta-blockage in the setting of infectious pathology.  Admitted to ICU for further management.   No lactate >4 or hypotension to warrant 30 cc/kilo bolus or indicate septic shock.   I have reviewed and agree with the residents interpretation of the following: lab data, EKG, x-rays and CT scans.  I have reviewed the following: old records at this facility.        Attending Critical Care:   Critical Care Times:   Direct Patient Care (initial evaluation, reassessments, and time considering the case)................................................................14 minutes.   Additional History from reviewing old medical records or taking additional history from the family, EMS, PCP, etc.......................6 minutes.   Ordering,  Reviewing, and Interpreting Diagnostic Studies...............................................................................................................5 minutes.   Documentation..................................................................................................................................................................................4 minutes.   Consultation with other Physicians. .................................................................................................................................................5 minutes.   ==============================================================  · Total Critical Care Time - exclusive of procedural time: 34 minutes.  ==============================================================  Critical care was necessary to treat or prevent imminent or life-threatening deterioration of the following conditions: cardiac arrhythmia and sepsis.   Critical care was time spent personally by me on the following activities: obtaining history from patient or relative, examination of patient, review of old charts, development of treatment plan with patient or relative, evaluation of patient's response to treatment, discussion with consultants, interpretation of cardiac measurements, re-evaluation of patient's conition and ordering and performing treatments and interventions.   Critical Care Condition: potentially life-threatening               Fer Aldana MD   Emergency Resident   [phone] (spectra)    Please note that this dictation was completed with computer voicerecognition software.  Quite often unanticipated grammatical, syntax, homophones, and other interpretive errors are inadvertently transcribed by the computer software.  Please disregard these errors.  Please excuse any errors that have escaped final proofreading.       Fer Aldana MD  Resident  04/22/22 1814       Parker Lares MD  04/22/22 3627

## 2022-04-22 NOTE — TELEPHONE ENCOUNTER
Received message from  Michelle at [facility]. Pt needs follow up stroke appt. Appt scheduled and confirmed. She will give to pt.

## 2022-04-22 NOTE — SUBJECTIVE & OBJECTIVE
Past Medical History:   Diagnosis Date    Abnormal Pap smear of cervix        Past Surgical History:   Procedure Laterality Date    AUGMENTATION OF BREAST      COSMETIC SURGERY         Review of patient's allergies indicates:  No Known Allergies    Current Facility-Administered Medications on File Prior to Encounter   Medication    [DISCONTINUED] ciprofloxacin HCl tablet    [DISCONTINUED] metroNIDAZOLE tablet     Current Outpatient Medications on File Prior to Encounter   Medication Sig    aspirin 81 MG Chew Take 81 mg by mouth once daily.    COVID-19 vacc,mRNA,Moderna,/PF (MODERNA COVID-19 VACCINE, EUA, IM)     sotalol (BETAPACE) 40 MG tablet Take 80 mg by mouth 2 (two) times daily.     Family History       Problem Relation (Age of Onset)    Ovarian cancer Maternal Aunt (60)          Tobacco Use    Smoking status: Never Smoker    Smokeless tobacco: Never Used   Substance and Sexual Activity    Alcohol use: Yes    Drug use: No    Sexual activity: Yes     Partners: Male     Birth control/protection: None     Review of Systems   Constitutional: Negative for chills, decreased appetite and fever.   HENT:  Negative for congestion and sore throat.    Eyes:  Negative for blurred vision and discharge.   Cardiovascular:  Negative for chest pain, claudication, cyanosis, dyspnea on exertion and leg swelling.   Respiratory:  Negative for cough, hemoptysis and shortness of breath.    Endocrine: Negative for cold intolerance and heat intolerance.   Skin:  Negative for color change.   Musculoskeletal:  Negative for muscle weakness and myalgias.   Gastrointestinal:  Negative for bloating and abdominal pain.   Neurological:  Negative for dizziness, focal weakness and weakness.   Psychiatric/Behavioral:  Negative for altered mental status and depression.    Objective:     Vital Signs (Most Recent):  Temp: 99.8 °F (37.7 °C) (04/22/22 1642)  Pulse: 92 (04/22/22 1824)  Resp: 18 (04/22/22 1824)  BP: (!) 117/53 (04/22/22 1824)  SpO2: 99 %  (04/22/22 4274)   Vital Signs (24h Range):  Temp:  [99.8 °F (37.7 °C)] 99.8 °F (37.7 °C)  Pulse:  [] 92  Resp:  [18-35] 18  SpO2:  [98 %-99 %] 99 %  BP: (106-139)/(53-67) 117/53        There is no height or weight on file to calculate BMI.    SpO2: 99 %  O2 Device (Oxygen Therapy): room air    No intake or output data in the 24 hours ending 04/22/22 1855    Lines/Drains/Airways       Peripheral Intravenous Line  Duration                  Peripheral IV - Single Lumen 04/22/22 1710 20 G Right Forearm <1 day         Peripheral IV - Single Lumen 04/22/22 1741 20 G Left Antecubital <1 day                    Physical Exam  Constitutional:       Appearance: She is well-developed.   HENT:      Head: Normocephalic and atraumatic.      Right Ear: External ear normal.      Left Ear: External ear normal.   Eyes:      Conjunctiva/sclera: Conjunctivae normal.   Cardiovascular:      Rate and Rhythm: Tachycardia present. Rhythm irregular.      Pulses: Intact distal pulses.           Radial pulses are 2+ on the right side and 2+ on the left side.      Heart sounds: Normal heart sounds.   Pulmonary:      Effort: Pulmonary effort is normal. No respiratory distress.      Breath sounds: Normal breath sounds. No wheezing.   Abdominal:      General: Bowel sounds are normal. There is no distension.      Palpations: Abdomen is soft.      Tenderness: There is no abdominal tenderness.   Musculoskeletal:         General: Normal range of motion.      Cervical back: Normal range of motion and neck supple.   Skin:     General: Skin is warm and dry.   Neurological:      Mental Status: She is alert and oriented to person, place, and time.      Comments: Mild dysarthria   Psychiatric:         Mood and Affect: Mood normal.         Behavior: Behavior normal.       Significant Labs: CMP   Recent Labs   Lab 04/22/22  1719      K 4.4      CO2 20*   GLU 92   BUN 22*   CREATININE 0.8   CALCIUM 10.4   PROT 8.4   ALBUMIN 3.4*   BILITOT 0.8    ALKPHOS 249*   AST 44*   ALT 66*   ANIONGAP 15   ESTGFRAFRICA >60.0   EGFRNONAA >60.0   , CBC   Recent Labs   Lab 04/22/22  1719   WBC 26.26*   HGB 13.4   HCT 41.7      , INR No results for input(s): INR, PROTIME in the last 48 hours., and Troponin   Recent Labs   Lab 04/22/22 1719   TROPONINI 0.034*       Significant Imaging: EKG: AF with rvr

## 2022-04-23 PROBLEM — R79.89 ELEVATED LFTS: Status: ACTIVE | Noted: 2022-04-23

## 2022-04-23 PROBLEM — D25.9 UTERINE FIBROID: Status: ACTIVE | Noted: 2022-04-23

## 2022-04-23 PROBLEM — I63.9 CEREBROVASCULAR ACCIDENT (CVA) DUE TO EMBOLISM: Status: ACTIVE | Noted: 2022-04-23

## 2022-04-23 PROBLEM — K57.92 DIVERTICULITIS: Status: ACTIVE | Noted: 2022-04-23

## 2022-04-23 LAB
ALBUMIN SERPL BCP-MCNC: 2.6 G/DL (ref 3.5–5.2)
ALP SERPL-CCNC: 197 U/L (ref 55–135)
ALT SERPL W/O P-5'-P-CCNC: 46 U/L (ref 10–44)
ANION GAP SERPL CALC-SCNC: 11 MMOL/L (ref 8–16)
ASCENDING AORTA: 2.69 CM
AST SERPL-CCNC: 22 U/L (ref 10–40)
AV INDEX (PROSTH): 0.68
AV MEAN GRADIENT: 5 MMHG
AV PEAK GRADIENT: 9 MMHG
AV VALVE AREA: 1.98 CM2
AV VELOCITY RATIO: 0.7
BASOPHILS # BLD AUTO: 0.07 K/UL (ref 0–0.2)
BASOPHILS NFR BLD: 0.3 % (ref 0–1.9)
BILIRUB SERPL-MCNC: 0.8 MG/DL (ref 0.1–1)
BSA FOR ECHO PROCEDURE: 1.81 M2
BUN SERPL-MCNC: 14 MG/DL (ref 6–20)
CALCIUM SERPL-MCNC: 8.5 MG/DL (ref 8.7–10.5)
CHLORIDE SERPL-SCNC: 107 MMOL/L (ref 95–110)
CO2 SERPL-SCNC: 20 MMOL/L (ref 23–29)
CREAT SERPL-MCNC: 0.6 MG/DL (ref 0.5–1.4)
CV ECHO LV RWT: 0.39 CM
DIFFERENTIAL METHOD: ABNORMAL
DOP CALC AO PEAK VEL: 1.51 M/S
DOP CALC AO VTI: 27.71 CM
DOP CALC LVOT AREA: 2.9 CM2
DOP CALC LVOT DIAMETER: 1.92 CM
DOP CALC LVOT PEAK VEL: 1.05 M/S
DOP CALC LVOT STROKE VOLUME: 54.81 CM3
DOP CALCLVOT PEAK VEL VTI: 18.94 CM
E WAVE DECELERATION TIME: 189.88 MSEC
E/A RATIO: 1.06
E/E' RATIO: 6.76 M/S
ECHO LV POSTERIOR WALL: 1.03 CM (ref 0.6–1.1)
EJECTION FRACTION: 55 %
EOSINOPHIL # BLD AUTO: 0.1 K/UL (ref 0–0.5)
EOSINOPHIL NFR BLD: 0.5 % (ref 0–8)
ERYTHROCYTE [DISTWIDTH] IN BLOOD BY AUTOMATED COUNT: 14.6 % (ref 11.5–14.5)
EST. GFR  (AFRICAN AMERICAN): >60 ML/MIN/1.73 M^2
EST. GFR  (NON AFRICAN AMERICAN): >60 ML/MIN/1.73 M^2
FRACTIONAL SHORTENING: 34 % (ref 28–44)
GLUCOSE SERPL-MCNC: 98 MG/DL (ref 70–110)
HCT VFR BLD AUTO: 32.6 % (ref 37–48.5)
HGB BLD-MCNC: 10.6 G/DL (ref 12–16)
IMM GRANULOCYTES # BLD AUTO: 0.18 K/UL (ref 0–0.04)
IMM GRANULOCYTES NFR BLD AUTO: 0.8 % (ref 0–0.5)
INTERVENTRICULAR SEPTUM: 0.89 CM (ref 0.6–1.1)
IVRT: 114.18 MSEC
LA MAJOR: 5.7 CM
LA MINOR: 5.66 CM
LA WIDTH: 4.23 CM
LEFT ATRIUM SIZE: 3.5 CM
LEFT ATRIUM VOLUME INDEX MOD: 27.3 ML/M2
LEFT ATRIUM VOLUME INDEX: 40.4 ML/M2
LEFT ATRIUM VOLUME MOD: 48.38 CM3
LEFT ATRIUM VOLUME: 71.48 CM3
LEFT INTERNAL DIMENSION IN SYSTOLE: 3.51 CM (ref 2.1–4)
LEFT VENTRICLE DIASTOLIC VOLUME INDEX: 77.34 ML/M2
LEFT VENTRICLE DIASTOLIC VOLUME: 136.9 ML
LEFT VENTRICLE MASS INDEX: 108 G/M2
LEFT VENTRICLE SYSTOLIC VOLUME INDEX: 29 ML/M2
LEFT VENTRICLE SYSTOLIC VOLUME: 51.34 ML
LEFT VENTRICULAR INTERNAL DIMENSION IN DIASTOLE: 5.33 CM (ref 3.5–6)
LEFT VENTRICULAR MASS: 191.68 G
LV LATERAL E/E' RATIO: 5.46 M/S
LV SEPTAL E/E' RATIO: 8.88 M/S
LYMPHOCYTES # BLD AUTO: 1.5 K/UL (ref 1–4.8)
LYMPHOCYTES NFR BLD: 7 % (ref 18–48)
MAGNESIUM SERPL-MCNC: 1.8 MG/DL (ref 1.6–2.6)
MCH RBC QN AUTO: 29.1 PG (ref 27–31)
MCHC RBC AUTO-ENTMCNC: 32.5 G/DL (ref 32–36)
MCV RBC AUTO: 90 FL (ref 82–98)
MONOCYTES # BLD AUTO: 1.3 K/UL (ref 0.3–1)
MONOCYTES NFR BLD: 6 % (ref 4–15)
MV PEAK A VEL: 0.67 M/S
MV PEAK E VEL: 0.71 M/S
MV STENOSIS PRESSURE HALF TIME: 55.07 MS
MV VALVE AREA P 1/2 METHOD: 3.99 CM2
NEUTROPHILS # BLD AUTO: 18.8 K/UL (ref 1.8–7.7)
NEUTROPHILS NFR BLD: 85.4 % (ref 38–73)
NRBC BLD-RTO: 0 /100 WBC
PHOSPHATE SERPL-MCNC: 3.1 MG/DL (ref 2.7–4.5)
PLATELET # BLD AUTO: 366 K/UL (ref 150–450)
PMV BLD AUTO: 11 FL (ref 9.2–12.9)
POTASSIUM SERPL-SCNC: 3.6 MMOL/L (ref 3.5–5.1)
PROT SERPL-MCNC: 6.2 G/DL (ref 6–8.4)
RA MAJOR: 4.66 CM
RA PRESSURE: 3 MMHG
RA WIDTH: 3.32 CM
RBC # BLD AUTO: 3.64 M/UL (ref 4–5.4)
RIGHT VENTRICULAR END-DIASTOLIC DIMENSION: 3.01 CM
RV TISSUE DOPPLER FREE WALL SYSTOLIC VELOCITY 1 (APICAL 4 CHAMBER VIEW): 18.1 CM/S
SINUS: 2.91 CM
SODIUM SERPL-SCNC: 138 MMOL/L (ref 136–145)
STJ: 2.44 CM
TDI LATERAL: 0.13 M/S
TDI SEPTAL: 0.08 M/S
TDI: 0.11 M/S
TRICUSPID ANNULAR PLANE SYSTOLIC EXCURSION: 2.62 CM
TROPONIN I SERPL DL<=0.01 NG/ML-MCNC: 0.03 NG/ML (ref 0–0.03)
WBC # BLD AUTO: 22.05 K/UL (ref 3.9–12.7)

## 2022-04-23 PROCEDURE — 83735 ASSAY OF MAGNESIUM: CPT | Performed by: NURSE PRACTITIONER

## 2022-04-23 PROCEDURE — 99223 1ST HOSP IP/OBS HIGH 75: CPT | Mod: ,,, | Performed by: STUDENT IN AN ORGANIZED HEALTH CARE EDUCATION/TRAINING PROGRAM

## 2022-04-23 PROCEDURE — 99223 PR INITIAL HOSPITAL CARE,LEVL III: ICD-10-PCS | Mod: ,,, | Performed by: STUDENT IN AN ORGANIZED HEALTH CARE EDUCATION/TRAINING PROGRAM

## 2022-04-23 PROCEDURE — 97165 OT EVAL LOW COMPLEX 30 MIN: CPT

## 2022-04-23 PROCEDURE — 99291 PR CRITICAL CARE, E/M 30-74 MINUTES: ICD-10-PCS | Mod: ,,, | Performed by: NURSE PRACTITIONER

## 2022-04-23 PROCEDURE — 80074 ACUTE HEPATITIS PANEL: CPT | Performed by: NURSE PRACTITIONER

## 2022-04-23 PROCEDURE — 99291 CRITICAL CARE FIRST HOUR: CPT | Mod: ,,, | Performed by: NURSE PRACTITIONER

## 2022-04-23 PROCEDURE — 97116 GAIT TRAINING THERAPY: CPT

## 2022-04-23 PROCEDURE — 99223 1ST HOSP IP/OBS HIGH 75: CPT | Mod: 25,,, | Performed by: INTERNAL MEDICINE

## 2022-04-23 PROCEDURE — 63600175 PHARM REV CODE 636 W HCPCS: Performed by: NURSE PRACTITIONER

## 2022-04-23 PROCEDURE — 84484 ASSAY OF TROPONIN QUANT: CPT | Performed by: NURSE PRACTITIONER

## 2022-04-23 PROCEDURE — 84100 ASSAY OF PHOSPHORUS: CPT | Performed by: NURSE PRACTITIONER

## 2022-04-23 PROCEDURE — 25000003 PHARM REV CODE 250: Performed by: NURSE PRACTITIONER

## 2022-04-23 PROCEDURE — 80053 COMPREHEN METABOLIC PANEL: CPT | Performed by: NURSE PRACTITIONER

## 2022-04-23 PROCEDURE — 97162 PT EVAL MOD COMPLEX 30 MIN: CPT

## 2022-04-23 PROCEDURE — 93010 EKG 12-LEAD: ICD-10-PCS | Mod: ,,, | Performed by: INTERNAL MEDICINE

## 2022-04-23 PROCEDURE — 93005 ELECTROCARDIOGRAM TRACING: CPT

## 2022-04-23 PROCEDURE — 94761 N-INVAS EAR/PLS OXIMETRY MLT: CPT

## 2022-04-23 PROCEDURE — 63600175 PHARM REV CODE 636 W HCPCS: Performed by: STUDENT IN AN ORGANIZED HEALTH CARE EDUCATION/TRAINING PROGRAM

## 2022-04-23 PROCEDURE — 85025 COMPLETE CBC W/AUTO DIFF WBC: CPT | Performed by: NURSE PRACTITIONER

## 2022-04-23 PROCEDURE — 99223 PR INITIAL HOSPITAL CARE,LEVL III: ICD-10-PCS | Mod: 25,,, | Performed by: INTERNAL MEDICINE

## 2022-04-23 PROCEDURE — 20600001 HC STEP DOWN PRIVATE ROOM

## 2022-04-23 PROCEDURE — 97535 SELF CARE MNGMENT TRAINING: CPT

## 2022-04-23 PROCEDURE — 93010 ELECTROCARDIOGRAM REPORT: CPT | Mod: ,,, | Performed by: INTERNAL MEDICINE

## 2022-04-23 RX ORDER — MAGNESIUM SULFATE HEPTAHYDRATE 40 MG/ML
2 INJECTION, SOLUTION INTRAVENOUS ONCE
Status: COMPLETED | OUTPATIENT
Start: 2022-04-23 | End: 2022-04-23

## 2022-04-23 RX ORDER — METOPROLOL TARTRATE 25 MG/1
25 TABLET, FILM COATED ORAL EVERY 6 HOURS
Status: DISCONTINUED | OUTPATIENT
Start: 2022-04-23 | End: 2022-04-23

## 2022-04-23 RX ADMIN — AMIODARONE HYDROCHLORIDE 1 MG/MIN: 1.8 INJECTION, SOLUTION INTRAVENOUS at 05:04

## 2022-04-23 RX ADMIN — AMIODARONE HYDROCHLORIDE 0.5 MG/MIN: 1.8 INJECTION, SOLUTION INTRAVENOUS at 10:04

## 2022-04-23 RX ADMIN — METOPROLOL TARTRATE 25 MG: 25 TABLET, FILM COATED ORAL at 05:04

## 2022-04-23 RX ADMIN — APIXABAN 5 MG: 5 TABLET, FILM COATED ORAL at 08:04

## 2022-04-23 RX ADMIN — MORPHINE SULFATE 2 MG: 2 INJECTION, SOLUTION INTRAMUSCULAR; INTRAVENOUS at 10:04

## 2022-04-23 RX ADMIN — APIXABAN 5 MG: 5 TABLET, FILM COATED ORAL at 12:04

## 2022-04-23 RX ADMIN — MAGNESIUM SULFATE 2 G: 2 INJECTION INTRAVENOUS at 05:04

## 2022-04-23 RX ADMIN — ACETAMINOPHEN 650 MG: 325 TABLET ORAL at 12:04

## 2022-04-23 RX ADMIN — POTASSIUM BICARBONATE 25 MEQ: 978 TABLET, EFFERVESCENT ORAL at 05:04

## 2022-04-23 RX ADMIN — PIPERACILLIN SODIUM AND TAZOBACTAM SODIUM 4.5 G: 4; .5 INJECTION, POWDER, FOR SOLUTION INTRAVENOUS at 10:04

## 2022-04-23 RX ADMIN — AMIODARONE HYDROCHLORIDE 150 MG: 1.5 INJECTION, SOLUTION INTRAVENOUS at 05:04

## 2022-04-23 RX ADMIN — PIPERACILLIN SODIUM AND TAZOBACTAM SODIUM 4.5 G: 4; .5 INJECTION, POWDER, FOR SOLUTION INTRAVENOUS at 08:04

## 2022-04-23 RX ADMIN — METOPROLOL TARTRATE 25 MG: 25 TABLET, FILM COATED ORAL at 11:04

## 2022-04-23 RX ADMIN — PIPERACILLIN SODIUM AND TAZOBACTAM SODIUM 4.5 G: 4; .5 INJECTION, POWDER, FOR SOLUTION INTRAVENOUS at 02:04

## 2022-04-23 NOTE — PLAN OF CARE
Problem: Physical Therapy  Goal: Physical Therapy Goal  Description: Goals to be met by: 5/3     Patient will increase functional independence with mobility by performin. Sit to stand transfer with Modified [place]  2. Gait  x 200 feet with Modified [place] using No AD.   3. Ascend/descend 12 stair with L Handrails Stand-by Assistance using NO AD.  4. Ambulate 2 minutes with supervision assistance with no AD while performing dynamic head turns, sudden change in speed and direction, withstand dynamic perturbations with no loss of balance.          Outcome: Ongoing, Progressing   Evaluation completed, initiated plan of care.   Sandra Deras, PT  2022

## 2022-04-23 NOTE — ASSESSMENT & PLAN NOTE
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 Treated with thrombolytics. Residual dysarthria. Was in rehab facility here in [place] with plans to discharge Monday. Reports she was doing well with PT/OT and speech. Reports previous non-compliance with ASA.     -- Continue Eliquis   -- PT/OT

## 2022-04-23 NOTE — SUBJECTIVE & OBJECTIVE
Past Medical History:   Diagnosis Date    Abnormal Pap smear of cervix        Past Surgical History:   Procedure Laterality Date    AUGMENTATION OF BREAST      COSMETIC SURGERY         Review of patient's allergies indicates:  No Known Allergies    Family History       Problem Relation (Age of Onset)    Ovarian cancer Maternal Aunt (60)          Tobacco Use    Smoking status: Never Smoker    Smokeless tobacco: Never Used   Substance and Sexual Activity    Alcohol use: Yes    Drug use: No    Sexual activity: Yes     Partners: Male     Birth control/protection: None      Review of Systems   Constitutional:  Positive for appetite change. Negative for chills and fever.   HENT:  Negative for sinus pressure and sinus pain.    Respiratory:  Negative for cough and shortness of breath.    Cardiovascular:  Negative for chest pain and leg swelling.   Gastrointestinal:  Positive for abdominal pain and diarrhea.   Genitourinary:  Positive for frequency. Negative for urgency.   Musculoskeletal:  Negative for back pain and neck pain.   Skin:  Negative for color change and pallor.   Neurological:  Negative for numbness and headaches.   Hematological:  Negative for adenopathy. Does not bruise/bleed easily.   Psychiatric/Behavioral:  Negative for agitation and confusion.    Objective:     Vital Signs (Most Recent):  Temp: 98.5 °F (36.9 °C) (04/22/22 2345)  Pulse: 87 (04/22/22 2345)  Resp: (!) 27 (04/22/22 2345)  BP: 138/61 (04/22/22 2345)  SpO2: 98 % (04/22/22 2345)   Vital Signs (24h Range):  Temp:  [98.5 °F (36.9 °C)-99.8 °F (37.7 °C)] 98.5 °F (36.9 °C)  Pulse:  [] 87  Resp:  [18-35] 27  SpO2:  [96 %-99 %] 98 %  BP: (106-146)/(53-76) 138/61   Weight: 73.5 kg (162 lb 0.6 oz)  Body mass index is 28.7 kg/m².    No intake or output data in the 24 hours ending 04/22/22 2346    Physical Exam  Vitals and nursing note reviewed.   Constitutional:       General: She is not in acute distress.     Appearance: Normal appearance. She is  not ill-appearing.   HENT:      Head: Normocephalic and atraumatic.      Mouth/Throat:      Mouth: Mucous membranes are dry.      Pharynx: Oropharynx is clear. No oropharyngeal exudate.   Eyes:      General: No scleral icterus.     Pupils: Pupils are equal, round, and reactive to light.   Cardiovascular:      Rate and Rhythm: Tachycardia present. Rhythm irregular.      Pulses: Normal pulses.      Heart sounds: No murmur heard.    No gallop.   Abdominal:      General: There is no distension.      Palpations: Abdomen is soft.      Tenderness: There is abdominal tenderness.   Musculoskeletal:      Right lower leg: No edema.      Left lower leg: No edema.   Skin:     General: Skin is warm and dry.      Capillary Refill: Capillary refill takes less than 2 seconds.      Coloration: Skin is not jaundiced.      Findings: Bruising present.   Neurological:      General: No focal deficit present.      Mental Status: She is alert and oriented to person, place, and time.      GCS: GCS eye subscore is 4. GCS verbal subscore is 5. GCS motor subscore is 6.      Cranial Nerves: No cranial nerve deficit.      Motor: No weakness.      Comments: Dysarthria s/p CVA   Psychiatric:         Mood and Affect: Mood normal.         Behavior: Behavior normal.       Vents:     Lines/Drains/Airways       Peripheral Intravenous Line  Duration                  Peripheral IV - Single Lumen 04/22/22 1710 20 G Right Forearm <1 day         Peripheral IV - Single Lumen 04/22/22 1741 20 G Left Antecubital <1 day                  Significant Labs:    CBC/Anemia Profile:  Recent Labs   Lab 04/22/22  1719   WBC 26.26*   HGB 13.4   HCT 41.7      MCV 89   RDW 14.6*        Chemistries:  Recent Labs   Lab 04/22/22  1719      K 4.4      CO2 20*   BUN 22*   CREATININE 0.8   CALCIUM 10.4   ALBUMIN 3.4*   PROT 8.4   BILITOT 0.8   ALKPHOS 249*   ALT 66*   AST 44*   MG 2.0   PHOS 3.3       All pertinent labs within the past 24 hours have been  reviewed.    Significant Imaging: I have reviewed all pertinent imaging results/findings within the past 24 hours.

## 2022-04-23 NOTE — HOSPITAL COURSE
"EMS reported HR near 200s. In the ED she was given 5mg IV metoprolol x 3 doses with reported improvement to 110-140s. She was evaluated by cardiology in the ED who recommend medical management with metoprolol tartrate 25mg q6h and resuming anticoagulation. Further ED work up revealed leukocytosis 26, normal lactic acid, troponin 0.034, and elevated LFTs. She was given 500ml IVF, started on broad spectrum abx, and infectious work up sent. CT A/P consistent with sigmoid diverticulitis. Additionally, uterus with lobular contour and presence of heterogeneous masses consistent with uterine fibroids. Per radiology read: "largest fibroid in the right fundus, approximately 7 cm, appears to be centrally necrotic." Critical care consulted for managemnet of severe sepsis and tachycardia and patient was admitted to ICU. She was continued on zosyn. Her troponin peaked at 0.037 before dropping down to 0.027. Cultures remained negative. She was stepped down to hospital medicine 4/23/22. EP consulted and recommended loading with IV amiodarone. She was concerted to po. Per cardiology, upon review of lifevest alerts, appeared that were all atrial fibrillation. Patient deemed ready for discharge. Plan discussed with pt, who was agreeable and amenable; medications were discussed and reviewed, outpatient follow-up arranged, ER precautions were given, all questions were answered to the pt's satisfaction, and Maria Dolores Tamayo  was subsequently discharged.    "

## 2022-04-23 NOTE — HPI
"Mrs. Tamayo is a 52 year old female with PMH notable for paroxsymal AF, CHF with most recent EF 25%, recent embolic CVA, dilated cardiomyopathy, and a reported in-hospital VT arrest who [place] [facility] [place] "because she was alerted by her life vest about an impending shock." In short, she was recently traveling from [place] to [place] where she had an embolic CVA, treated with thrombolytics, she was intubated and sedated. 2 days later had a VT arrest --> attempts were made at ICD placement, however, unsuccessful, therefore she was given a life vest. She has been back in [place] undergoing rehabilitative services when she was alerted by her life vest about an impending shock. She was able to cancel the alert. EMS was called.   "

## 2022-04-23 NOTE — PROGRESS NOTES
[facility]  Cardiology  Progress Note     Hospital Length of Stay: 1    Interval History: No acute events overnight. Patient feeling well this morning. Telemetry reviewed. Patient with several episodes of tachycardia >140 bpm after midnight but better controlled and in sinus rhythm for past few hours.    Vitals:  Temp:  [98.5 °F (36.9 °C)-99.8 °F (37.7 °C)] 98.6 °F (37 °C)  Pulse:  [] 64  Resp:  [18-35] 34  SpO2:  [96 %-99 %] 99 %  BP: ()/(47-76) 111/53    Intake/Output    Intake/Output Summary (Last 24 hours) at 4/23/2022 0906  Last data filed at 4/23/2022 0630  Gross per 24 hour   Intake 486.18 ml   Output 600 ml   Net -113.82 ml       Physical Exam  Gen: No apparent distress, resting comfortably  HEENT: Pupils equal and reactive to light  Cardio: Regular rate, point of maximal impulse not displaced, no murmur noted, 2+ radial pulses bilaterally, 2+ DP pulses bilaterally  Resp: CTAB, no wheezing  Abd: Soft, non-tender, non-distended  Skin: Warm, dry, no peripheral edema noted, LifeVest in place  Neuro: Alert and oriented x3  Psych: Normal mood and affect    Labs:  Recent Labs   Lab 04/22/22 1719 04/23/22 0225   WBC 26.26* 22.05*   HGB 13.4 10.6*   HCT 41.7 32.6*    366     Recent Labs   Lab 04/22/22 1719 04/23/22 0225    138   K 4.4 3.6    107   CO2 20* 20*   BUN 22* 14   CREATININE 0.8 0.6   GLU 92 98   CALCIUM 10.4 8.5*   MG 2.0 1.8   PHOS 3.3 3.1          Current Meds:   apixaban  5 mg Oral BID    metoprolol tartrate  25 mg Oral Q6H    piperacillin-tazobactam (ZOSYN) IVPB  4.5 g Intravenous Q8H    vancomycin (VANCOCIN) IVPB  2,000 mg Intravenous Once       Continuous Infusions:      PRN:  acetaminophen, morphine, sodium chloride 0.9%    Assessment and Plan:    1. A-fib with RVR  -Exacerbation likely being driven by sepsis/acute infection with diverticulitis  -CHADS-VaSC of 4, continue Eliquis 5 mg BiD (patient has taken for two weeks since her  stroke)  -Recommend EP consult  -Spoke with EP and recommending IV amiodarone protocol then PO amiodarone  -Patient was previously on Sotalol but due to concern that this may have contributed     2. Prior VT arrest at OSH in [place] 2 weeks ago s/p LifeVest  -Per her report, she had an angiogram done after the VT arrest and was told her coronaries were normal  -Need records from outside hospital  -Attempted ICD placement was unsuccessful  -Patient came for LifeVest alarming, no shocks and was asymptomatic. Have reached out to LifeVest representative to obtain strips from that event which showed A-fib with RVR not VT  -Recommend keeping K >4 and Mg >2     3. HF with recovered EF, non-ischemic  -Follow up Echo ordered for this morning  -Re-start GDMT when able from BP standpoint, patient reports being on lisinopril at home    4. Diverticulitis  -Patient on broad spectrum Abx, continue management per primary    Julio César Oliveira MD  [facility] PGY-4    Pager number: [phone]    Staff attestation to follow.    This note was prepared using voice recognition system and may have sound alike errors that may have been overlooked even with proof reading.

## 2022-04-23 NOTE — NURSING
Charge nurse notified of order for Amio bolus and gtt. Patient is NSR on monitor with HR sustaining 60's-70's. Secure Chat sent to Ke Farrell MD for clarification. MD abraham gtt and bolus. Nurse to administer medication as ordered. KENDY Lawton aware. Medication pending from pharmacy.

## 2022-04-23 NOTE — PLAN OF CARE
Problem: Adult Inpatient Plan of Care  Goal: Plan of Care Review  Outcome: Ongoing, Progressing  Goal: Patient-Specific Goal (Individualized)  Outcome: Ongoing, Progressing  Goal: Absence of Hospital-Acquired Illness or Injury  Outcome: Ongoing, Progressing  Goal: Optimal Comfort and Wellbeing  Outcome: Ongoing, Progressing  Goal: Readiness for Transition of Care  Outcome: Ongoing, Progressing     Problem: Fall Injury Risk  Goal: Absence of Fall and Fall-Related Injury  Outcome: Ongoing, Progressing   Patient is alert, oriented and conscious. Vital signs taken and recorded. SPO2 maintain in RA. Medication given as per order. Intake output recorded. Mobilization done in room. Amiodarone IV continuous and bolus given. Will continue to monitor.

## 2022-04-23 NOTE — ASSESSMENT & PLAN NOTE
"Known uterine fibroids. Per radiology CT result "Enlarged uterus with lobular contour and presence of heterogeneous masses consistent with uterine fibroids.  Largest fibroid in the right fundus, approximately 7 cm, appears to be centrally necrotic" --> she reports she has previously had embolization of fibroids. Do not feel this is contributing to presenting symptoms. Can follow up outpatient.   "

## 2022-04-23 NOTE — CONSULTS
Patient seen and evaluated by critical care medicine. To be admitted to ICU for further management. Full H&P to follow.     JASMYN Figueredo  Pulmonary Critical Care Medicine   04/22/2022

## 2022-04-23 NOTE — PROGRESS NOTES
Pharmacokinetic Initial Assessment: IV Vancomycin    Assessment/Plan:    Initiate intravenous vancomycin with loading dose of 2000 mg once followed by a maintenance dose of vancomycin 1000 mg IV every 12 hours.  Desired empiric serum trough concentration is 10 to 20 mcg/mL.  Draw vancomycin trough level 60 min prior to fourth dose on 04/25/2022 at 1100.  Pharmacy will continue to follow and monitor vancomycin.      Please contact pharmacy at extension [phone] with any questions regarding this assessment.     Thank you for the consult,   Bernard Casanova       Patient brief summary:  Maria Dolores Tamayo is a 52 y.o. female initiated on antimicrobial therapy with IV Vancomycin for treatment of suspected urinary tract infection.    Drug Allergies:   Review of patient's allergies indicates:  No Known Allergies    Actual Body Weight:   73.5 kg    Renal Function:   Estimated Creatinine Clearance: 78.1 mL/min (based on SCr of 0.8 mg/dL).     CBC (last 72 hours):  Recent Labs   Lab Result Units 04/22/22  1719   WBC K/uL 26.26*   Hemoglobin g/dL 13.4   Hematocrit % 41.7   Platelets K/uL 383   Gran % % 85.2*   Lymph % % 6.5*   Mono % % 6.6   Eosinophil % % 0.4   Basophil % % 0.3   Differential Method  Automated       Metabolic Panel (last 72 hours):  Recent Labs   Lab Result Units 04/22/22  1719 04/22/22  1854 04/22/22  2043   Sodium mmol/L 136  --   --    Potassium mmol/L 4.4  --   --    Chloride mmol/L 101  --   --    CO2 mmol/L 20*  --   --    Glucose mg/dL 92  --   --    Glucose, UA   --  Negative Negative   BUN mg/dL 22*  --   --    Creatinine mg/dL 0.8  --   --    Albumin g/dL 3.4*  --   --    Total Bilirubin mg/dL 0.8  --   --    Alkaline Phosphatase U/L 249*  --   --    AST U/L 44*  --   --    ALT U/L 66*  --   --    Magnesium mg/dL 2.0  --   --    Phosphorus mg/dL 3.3  --   --        Drug levels (last 3 results):  No results for input(s): VANCOMYCINRA, VANCOMYCINPE, VANCOMYCINTR in the last 72 hours.    Microbiologic  Results:  Microbiology Results (last 7 days)     Procedure Component Value Units Date/Time    Blood culture #1 **CANNOT BE ORDERED STAT** [[id number]] Collected: 04/22/22 1836    Order Status: Sent Specimen: Blood from Peripheral, Antecubital, Left Updated: 04/22/22 1854    Blood culture #2 **CANNOT BE ORDERED STAT** [[id number]] Collected: 04/22/22 1836    Order Status: Sent Specimen: Blood from Peripheral, Forearm, Right Updated: 04/22/22 1854

## 2022-04-23 NOTE — ASSESSMENT & PLAN NOTE
52 year old female with recent CVA, AF, CHF, and known diverticulosis found to have diverticulitis on CT scan. Endorses abdominal pain, poor PO intake, and dysuria. Abdominal tenderness on exam.     -- Stop vancomycin   -- Continue Zosyn   -- Follow up culture data   -- Liquid diet. Advance as tolerated   -- Morphine PRN pain

## 2022-04-23 NOTE — ASSESSMENT & PLAN NOTE
Unsure of baseline, AST/ALT/Alk Phos elevated at admission. Congestion vs medication vs acute illness vs ?     -- Follow up hepatitis panel   -- Follow up abdominal u/s   -- CMP daily and trend

## 2022-04-23 NOTE — HOSPITAL COURSE
52 year old female admitted to [facility] MICU for management of sepsis and atrial fibrillation with RVR now rate controlled. Never required vasopressor support for shock. CT abdomen pelvis revealed diverticulitis. Further infectious work up pending, and remains on Zosyn. Cardiology following for AF RVR and HF. Troponin peaked at 0.037 and now down to 0.027.

## 2022-04-23 NOTE — ASSESSMENT & PLAN NOTE
[id number] Score: 1    6 High Risk:18.2% if no warfarin  5 High Risk: 12.5% if no warfarin  4 High Risk: 8.5% if no warfarin  3 High Risk: 5.9% if no warfarin  2 Intermediate Risk: 4% if no warfarin  1 Intermediate Risk: 2.8% if no warfarin    -- Continuous telemetry   -- EKG for acute changes   -- Mg > 2 , Phos > 3, K > 4   -- Cardiology following. Appreciate assistance   -- START metoprolol 25mg q6h   -- Continue eliquis   -- Follow up formal ECHO

## 2022-04-23 NOTE — HPI
"Mrs. Tamayo is a 52 year old female with PMH notable for paroxsymal AF, CHF with most recent EF 25%, recent embolic CVA, dilated cardiomyopathy, and in hospital VT arrest who presented to [facility] ED "because she was alerted by her life vest about an impending shock." In short, she was recently traveling from [place] to [place] where she had an embolic CVA, treated with thrombolytics, she was intubated and sedated. 2 days later had a VT arrest --> attempts were made at ICD placement, however, unsuccessful, therefore she was given a life vest. She has been back in [place] undergoing rehabilitative services when she was alerted by her life vest about an impending shock. She was able to cancel the alert. EMS was called.     EMS reports HR near 200s. In the ED she was given 5mg IV metoprolol x 3 doses with reported improvement to 110-140s. She was evaluated by cardiology in the ED who recommend medical management with metoprolol tartrate 25mg q6h and resuming anticoagulation.     Further ED work up revealed leukocytosis 26, normal lactic acid, troponin 0.034, and elevated LFTs. She was given 500ml IVF, started on broad spectrum abx, and infectious work up sent. CT A/P consistent with sigmoid diverticulitis. Additionally, uterus with lobular contour and presence of heterogeneous masses consistent with uterine fibroids. Per radiology read: "largest fibroid in the right fundus, approximately 7 cm, appears to be centrally necrotic."     Critical care consulted for managemnet of severe sepsis and tachycardia.          "

## 2022-04-23 NOTE — PROGRESS NOTES
Pharmacy Vancomycin Consult Note     Therapy with Vancomycin complete and/or consult discontinued by provider.  Pharmacy will sign off, please re-consult as needed.    April Rocha Pharm D 4/23/2022 1:37 AM

## 2022-04-23 NOTE — PLAN OF CARE
Problem: Occupational Therapy  Goal: Occupational Therapy Goal  Description: Goals to be met by: 5/23/22     Patient will increase functional independence with ADLs by performing:    UE Dressing with [place].  LE Dressing with [place].  Grooming while standing at sink with [place].  Toileting from toilet with [place] for hygiene and clothing management.   Bathing from  shower chair/bench with [place].  Toilet transfer to toilet with [place].  Increased functional strength to WFL for B UE.  Upper extremity exercise program x15 reps per handout, with assistance as needed.    Outcome: Ongoing, Progressing

## 2022-04-23 NOTE — PT/OT/SLP EVAL
"Physical Therapy Evaluation  Co-evaluation with OT due to acuity of condition, level of skilled assist needed for assessment of safety with mobility.   Patient Name:  Maria Dolores Tamayo   MRN:  [id number]    Recommendations:     Discharge Recommendations:  home health PT (has speech therapy needs 2/2 expressive aphasia)   Discharge Equipment Recommendations: none   Barriers to discharge: None    Assessment:     Maria Dolores Tamayo is a 52 y.o. female admitted with a medical diagnosis of Diverticulitis.  She presents with the following impairments/functional limitations:  gait instability, impaired balance, impaired functional mobilty (expressive aphasia). The patient presents from [facility] 2/2 [facility], patient with recent CVA with residual expressive aphasia and mild unsteadiness with higher level balance tasks. She has performed stair training at rehab and demo'd good functional strength, she had planned to discharge from rehab on Monday 4/25. She is safe to return home with HHPT to address the above deficits.     Rehab Prognosis: Good; patient would benefit from acute skilled PT services to address these deficits and reach maximum level of function.    Recent Surgery: * No surgery found *      Plan:     During this hospitalization, patient to be seen 3 x/week to address the identified rehab impairments via gait training, therapeutic activities, therapeutic exercises, neuromuscular re-education and progress toward the following goals:    · Plan of Care Expires:  05/23/22    Subjective     Chief Complaint: "I have diverticulitis, I was hurting and then I got an alert from my life vest that I was going to get a shock"  Patient/Family Comments/goals: return home ASAP  Pain/Comfort:  · Pain Rating 1: 0/10    Patients cultural, spiritual, Denominational conflicts given the current situation: no    Living Environment:  The patient lives with her spouse in a townhouse, 0 [address]EL, bedroom on 2nd floor 12 steps with L HR. WIS with bult " in [place]. 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   Prior to admission, patients level of function was independent prior to CVA. At rehab, patient has progressed to ambulating without AD.  Equipment used at home: none.  DME owned (not currently used): none.  Upon discharge, patient will have assistance from family.    Objective:     Communicated with RN prior to session.  Patient found HOB elevated with blood pressure cuff, pulse ox (continuous), telemetry, peripheral IV, life vest  upon PT entry to room.    General Precautions: Standard, fall   Orthopedic Precautions:N/A   Braces: N/A  Respiratory Status: Room air    Exams:    Cognitive Exam  Patient is A&O x4 and follows 100% of one -step commands, no vision deficits, good visual tracking, some word finding difficulty   Fine Motor Coordination   -       WNL heel to shin     Postural Exam Patient presented with the following abnormalities:    -       Rounded shoulders     Sensation    -       Light touch intact ANNA LE   Skin Integrity/Edema     -       Skin integrity: visibly intact  -       Edema: NA   R LE ROM WNL   R LE Strength 4+/5 hip flexion, knee ext/flex, and ankle DF/PF   L LE ROM WNL   L LE Strength  4+/5 hip flexion, knee ext/flex, and ankle DF/PF       Balance   Static Sitting independent    Dynamic Sitting independent    Static Standing supervision assistance    Dynamic Standing       stand by assistance   Wide PRINCESS eyes open: WNL  Wide PRINCESS eyes closed: WNL  Narrow PRINCESS eyes open: mild postural sway  Narrow PRINCESS eyes closed: mild postural sway  Tandem stance R leading: WNL, 10 seconds  Tandem stance L leadin seconds, unable to maintain        Functional Mobility:    Bed Mobility  Supine to Sit on the L side:  supervision assistance    Transfers Sit to Stand:  supervision assistance    Gait  Gait Distance: 40 ft with no AD  Assistance Level: stand by assistance  Description: decreased gait speed, mild unsteadiness with R turn  scissoring of feet with hip strategy to compensate, decreased step length   Stairs Deferred this session, had performed in rehab with good performance per patient and family          Therapeutic Activities and Exercises:   Patient and familyeducated on role of therapy, goals of session, benefits of out of bed mobility. Patient agreeable to mobilize with therapy.  Discussed PT plan of care during hospitalization. Patient educated that they need to call for assistance to mobilize out of bed. Whiteboard updated as appropriate. Patient educated on how their diagnosis impacts their mobility within PT scope of practice.     Gait training: cued for reciprocal strides, pacing    Patient educated on PT schedule.  Encouraged patient to ambulate, sit up in chair 3x/day to prevent deconditioning during hospitalization. Patient verbalized understanding and agreement not to mobilize without RN assist. Patient in agreement with PT POC, HHPT increased caregiver assist on discharge.     Patient safe to ambulate with RN assist x1 person.     AM-PAC 6 CLICK MOBILITY  Total Score:23     Patient left up in chair with all lines intact, call button in reach, RN notified and family present.    GOALS:   Multidisciplinary Problems     Physical Therapy Goals        Problem: Physical Therapy    Goal Priority Disciplines Outcome Goal Variances Interventions   Physical Therapy Goal     PT, PT/OT Ongoing, Progressing     Description: Goals to be met by: 5/3     Patient will increase functional independence with mobility by performin. Sit to stand transfer with Modified [place]  2. Gait  x 200 feet with Modified [place] using No AD.   3. Ascend/descend 12 stair with L Handrails Stand-by Assistance using NO AD.  4. Ambulate 2 minutes with supervision assistance with no AD while performing dynamic head turns, sudden change in speed and direction, withstand dynamic perturbations with no loss of balance.                            History:     Past Medical History:   Diagnosis Date    Abnormal Pap smear of cervix        Past Surgical History:   Procedure Laterality Date    AUGMENTATION OF BREAST      COSMETIC SURGERY         Time Tracking:     PT Received On: 04/23/22  PT Start Time: 1029     PT Stop Time: 1053  PT Total Time (min): 24 min     Billable Minutes: Evaluation 12 and Gait Training 12      04/23/2022

## 2022-04-23 NOTE — CONSULTS
[facility] - Emergency Dept  Cardiology  Consult Note    Patient Name: Maria Dolores Tamayo  MRN: [id number]  Admission Date: 4/22/2022  Hospital Length of Stay: 0 days  Code Status: No Order   Attending Provider: Parker Lares MD   Consulting Provider: Kevyn Jones MD  Primary Care Physician: Jamie Kirkpatrick MD  Principal Problem:<principal problem not specified>    Patient information was obtained from patient and ER records.     Inpatient consult to Cardiology  Consult performed by: Kevyn Jones MD  Consult ordered by: Fer Aldana MD        Subjective:     Chief Complaint:  Life vest alert     HPI:   Maria Dolores Tamayo is a 52 y.o. female with paroxsymal AF (diagnosed 13 years ago) along with HF with recovered EF who had a recent admission for CVA in [place]. 2 days following her CVA, she had an in-hospital VT cardiac arrest. Echo at the time reportedly showed EF 25%. They tried to place an ICD but reportedly were unable to pass leads through to heart, so she was given a lifevest and discharged to a rehab facility in [place]. She presents today because she was alerted by her Lifevest about an impending shock, which she was able to cancel. Pt reports that she was not doing any activity in particular and was in her normal state of health in the rehab facility. I was later notified by ER that nursing home MD reported recent fevers and leukocytosis.     EMS notified and found her HR to be close to 200. She was brought to ER and given metoprolol 5 mg iv x 3 doses with improvement in HR to 110-140. Pt again denying any symptoms. Our labs notable for a WBC of 26k. Cardiology consulted for AF with RVR              Past Medical History:   Diagnosis Date    Abnormal Pap smear of cervix        Past Surgical History:   Procedure Laterality Date    AUGMENTATION OF BREAST      COSMETIC SURGERY         Review of patient's allergies indicates:  No Known Allergies    Current Facility-Administered Medications on File Prior to  Encounter   Medication    [DISCONTINUED] ciprofloxacin HCl tablet    [DISCONTINUED] metroNIDAZOLE tablet     Current Outpatient Medications on File Prior to Encounter   Medication Sig    aspirin 81 MG Chew Take 81 mg by mouth once daily.    COVID-19 vacc,mRNA,Moderna,/PF (MODERNA COVID-19 VACCINE, EUA, IM)     sotalol (BETAPACE) 40 MG tablet Take 80 mg by mouth 2 (two) times daily.     Family History       Problem Relation (Age of Onset)    Ovarian cancer Maternal Aunt (60)          Tobacco Use    Smoking status: Never Smoker    Smokeless tobacco: Never Used   Substance and Sexual Activity    Alcohol use: Yes    Drug use: No    Sexual activity: Yes     Partners: Male     Birth control/protection: None     Review of Systems   Constitutional: Negative for chills, decreased appetite and fever.   HENT:  Negative for congestion and sore throat.    Eyes:  Negative for blurred vision and discharge.   Cardiovascular:  Negative for chest pain, claudication, cyanosis, dyspnea on exertion and leg swelling.   Respiratory:  Negative for cough, hemoptysis and shortness of breath.    Endocrine: Negative for cold intolerance and heat intolerance.   Skin:  Negative for color change.   Musculoskeletal:  Negative for muscle weakness and myalgias.   Gastrointestinal:  Negative for bloating and abdominal pain.   Neurological:  Negative for dizziness, focal weakness and weakness.   Psychiatric/Behavioral:  Negative for altered mental status and depression.    Objective:     Vital Signs (Most Recent):  Temp: 99.8 °F (37.7 °C) (04/22/22 1642)  Pulse: 92 (04/22/22 1824)  Resp: 18 (04/22/22 1824)  BP: (!) 117/53 (04/22/22 1824)  SpO2: 99 % (04/22/22 1824)   Vital Signs (24h Range):  Temp:  [99.8 °F (37.7 °C)] 99.8 °F (37.7 °C)  Pulse:  [] 92  Resp:  [18-35] 18  SpO2:  [98 %-99 %] 99 %  BP: (106-139)/(53-67) 117/53        There is no height or weight on file to calculate BMI.    SpO2: 99 %  O2 Device (Oxygen Therapy): room  air    No intake or output data in the 24 hours ending 04/22/22 1855    Lines/Drains/Airways       Peripheral Intravenous Line  Duration                  Peripheral IV - Single Lumen 04/22/22 1710 20 G Right Forearm <1 day         Peripheral IV - Single Lumen 04/22/22 1741 20 G Left Antecubital <1 day                    Physical Exam  Constitutional:       Appearance: She is well-developed.   HENT:      Head: Normocephalic and atraumatic.      Right Ear: External ear normal.      Left Ear: External ear normal.   Eyes:      Conjunctiva/sclera: Conjunctivae normal.   Cardiovascular:      Rate and Rhythm: Tachycardia present. Rhythm irregular.      Pulses: Intact distal pulses.           Radial pulses are 2+ on the right side and 2+ on the left side.      Heart sounds: Normal heart sounds.   Pulmonary:      Effort: Pulmonary effort is normal. No respiratory distress.      Breath sounds: Normal breath sounds. No wheezing.   Abdominal:      General: Bowel sounds are normal. There is no distension.      Palpations: Abdomen is soft.      Tenderness: There is no abdominal tenderness.   Musculoskeletal:         General: Normal range of motion.      Cervical back: Normal range of motion and neck supple.   Skin:     General: Skin is warm and dry.   Neurological:      Mental Status: She is alert and oriented to person, place, and time.      Comments: Mild dysarthria   Psychiatric:         Mood and Affect: Mood normal.         Behavior: Behavior normal.       Significant Labs: CMP   Recent Labs   Lab 04/22/22 1719      K 4.4      CO2 20*   GLU 92   BUN 22*   CREATININE 0.8   CALCIUM 10.4   PROT 8.4   ALBUMIN 3.4*   BILITOT 0.8   ALKPHOS 249*   AST 44*   ALT 66*   ANIONGAP 15   ESTGFRAFRICA >60.0   EGFRNONAA >60.0   , CBC   Recent Labs   Lab 04/22/22 1719   WBC 26.26*   HGB 13.4   HCT 41.7      , INR No results for input(s): INR, PROTIME in the last 48 hours., and Troponin   Recent Labs   Lab 04/22/22 1719    TROPONINI 0.034*       Significant Imaging: EKG: AF with rvr    Assessment and Plan:     Chronic systolic heart failure  See af with rvr  Recently diagnosed systolic heart failure with EF of 25% from patient's report.  Currently appears compensated  Would treat underlying infection  Metoprolol tartrate from AF with RVR as patient has no evidence of cardiogenic shock  Consider restarting GDMT when able.      Atrial fibrillation with RVR  51 yo F with recent history of CVA, pAF, cardiomyopathy, recent cardiac arrest 2/2 VT presenting with history of low grade fevers, leukocytosis, and AF with RVR.    - AF with rvr likely 2/2 underlying infection.  - aside from HR, pt stable in terms of blood pressure. No evidence of cardiogenic shock  - please treat underlying infection per primary team  - recommend metoprolol tartrate 25 mg q6h to avoid RVR rise back to nearly 200. Can consider holding if BP on lower side or if there is concern for cardiogenic shock (hypotension, cold extremities, volume overload)  - continue anticoagulation  - recommend formal echo in am  - monitor and aggressively replace electrolytes  - will need to reintroduce GDMT once underlying infection in treated. As stated, would start metoprolol 25 mg q6h for now  - given recent history of CVA, would have high threshold to shock, consider dig if BB is causing hypotension and blood pressure low (goal BP map > 65)          VTE Risk Mitigation (From admission, onward)    None          Thank you for your consult. I will follow-up with patient. Please contact us if you have any additional questions.    Kevyn Jones MD  Cardiology   [facility] - [facility] Dept

## 2022-04-23 NOTE — PLAN OF CARE
CMICU DAILY GOALS       A: Awake    RASS: Goal -    Actual -     Restraint necessity:    B: Breathe   SBT: Not intubated   C: Coordinate A & B, analgesics/sedatives   Pain: managed    SAT: Not intubated  D: Delirium   CAM-ICU: Overall CAM-ICU: Negative  E: Early(intubated/ Progressive (non-intubated) Mobility   MOVE Screen: Pass   Activity: Activity Management: Rolling - L1  FAS: Feeding/Nutrition   Diet order: Diet/Nutrition Received: clear liquid,    T: Thrombus   DVT prophylaxis: VTE Required Core Measure: Pharmacological prophylaxis initiated/maintained  H: HOB Elevation   Head of Bed (HOB) Positioning: HOB at 30 degrees  U: Ulcer Prophylaxis   GI: no  G: Glucose control   managed    S: Skin      Device Skin Pressure Protection: absorbent pad utilized/changed     Pressure Reduction Techniques: frequent weight shift encouraged  Skin Protection: adhesive use limited  B: Bowel Function   no issues   I: Indwelling Catheters   Luong necessity:     CVC necessity: No  D: De-escalation Antibiotics   Yes    Family/Goals of care/Code Status   Code Status: Full Code    24H Vital Sign Range  Temp:  [98.5 °F (36.9 °C)-99.8 °F (37.7 °C)]   Pulse:  []   Resp:  [18-35]   BP: ()/(47-76)   SpO2:  [96 %-99 %]      Shift Events   Mg & K+ replaced, Prn Tylenol given for pain, Liver US negra for 0800. No acute events throughout shift    VS and assessment per flow sheet, patient progressing towards goals as tolerated, plan of care reviewed with family, all concerns addressed, will continue to monitor.

## 2022-04-23 NOTE — RESIDENT HANDOFF
"ICU Transfer of Care Note  Critical Care Medicine    Admit Date: 4/22/2022  LOS: 1    CC: Diverticulitis    Code Status: Full Code         HPI and Hospital Course:     HPI:  Mrs. Tamayo is a 52 year old female with PMH notable for paroxsymal AF, CHF with most recent EF 25%, recent embolic CVA, dilated cardiomyopathy, and in hospital VT arrest who presented to [facility] ED "because she was alerted by her life vest about an impending shock." In short, she was recently traveling from [place] to [place] where she had an embolic CVA, treated with thrombolytics, she was intubated and sedated. 2 days later had a VT arrest --> attempts were made at ICD placement, however, unsuccessful, therefore she was given a life vest. She has been back in [place] undergoing rehabilitative services when she was alerted by her life vest about an impending shock. She was able to cancel the alert. EMS was called.     EMS reports HR near 200s. In the ED she was given 5mg IV metoprolol x 3 doses with reported improvement to 110-140s. She was evaluated by cardiology in the ED who recommend medical management with metoprolol tartrate 25mg q6h and resuming anticoagulation.     Further ED work up revealed leukocytosis 26, normal lactic acid, troponin 0.034, and elevated LFTs. She was given 500ml IVF, started on broad spectrum abx, and infectious work up sent. CT A/P consistent with sigmoid diverticulitis. Additionally, uterus with lobular contour and presence of heterogeneous masses consistent with uterine fibroids. Per radiology read: "largest fibroid in the right fundus, approximately 7 cm, appears to be centrally necrotic."     Critical care consulted for managemnet of severe sepsis and tachycardia.            Hospital/ICU Course:  52 year old female admitted to [facility] MICU for management of sepsis and atrial fibrillation with RVR now rate controlled. Never required vasopressor support for shock. CT abdomen pelvis revealed diverticulitis. Further " infectious work up pending, and remains on Zosyn. Cardiology following for AF RVR and HF. Troponin peaked at 0.037 and now down to 0.027.    To Follow Up:     -- Follow up ECHO   -- Follow up abdominal U/S. LFTs improving this AM   -- Cardiology following. Appreciate assistance   -- Continue broad spectrum abx, and follow up culture data   -- Advance diet as tolerated   -- PT/OT recommendations     Discharge Plan:     Was planned to be d/c from rehab facility Monday. Follow up PT/OT recommendations.     Call [phone] with questions.     LACI Benavidez [facility]  Pulmonary Critical Care Medicine   04/23/2022

## 2022-04-23 NOTE — H&P
"[facility] - Cardiac Medical ICU  Critical Care Medicine  History & Physical    Patient Name: Maria Dolores Tamayo  MRN: [id number]  Admission Date: 4/22/2022  Hospital Length of Stay: 1 days  Code Status: Full Code  Attending Physician: Vinay Miller*   Primary Care Provider: Jamie Kirkpatrick MD   Principal Problem: Diverticulitis    Subjective:     HPI:  Mrs. Tamayo is a 52 year old female with PMH notable for paroxsymal AF, CHF with most recent EF 25%, recent embolic CVA, dilated cardiomyopathy, and in hospital VT arrest who presented to [facility] ED "because she was alerted by her life vest about an impending shock." In short, she was recently traveling from [place] to [place] where she had an embolic CVA, treated with thrombolytics, she was intubated and sedated. 2 days later had a VT arrest --> attempts were made at ICD placement, however, unsuccessful, therefore she was given a life vest. She has been back in [place] undergoing rehabilitative services when she was alerted by her life vest about an impending shock. She was able to cancel the alert. EMS was called.     EMS reports HR near 200s. In the ED she was given 5mg IV metoprolol x 3 doses with reported improvement to 110-140s. She was evaluated by cardiology in the ED who recommend medical management with metoprolol tartrate 25mg q6h and resuming anticoagulation.     Further ED work up revealed leukocytosis 26, normal lactic acid, troponin 0.034, and elevated LFTs. She was given 500ml IVF, started on broad spectrum abx, and infectious work up sent. CT A/P consistent with sigmoid diverticulitis. Additionally, uterus with lobular contour and presence of heterogeneous masses consistent with uterine fibroids. Per radiology read: "largest fibroid in the right fundus, approximately 7 cm, appears to be centrally necrotic."     Critical care consulted for managemnet of severe sepsis and tachycardia.              Hospital/ICU Course:  No notes on file     Past " Medical History:   Diagnosis Date    Abnormal Pap smear of cervix        Past Surgical History:   Procedure Laterality Date    AUGMENTATION OF BREAST      COSMETIC SURGERY         Review of patient's allergies indicates:  No Known Allergies    Family History       Problem Relation (Age of Onset)    Ovarian cancer Maternal Aunt (60)          Tobacco Use    Smoking status: Never Smoker    Smokeless tobacco: Never Used   Substance and Sexual Activity    Alcohol use: Yes    Drug use: No    Sexual activity: Yes     Partners: Male     Birth control/protection: None      Review of Systems   Constitutional:  Positive for appetite change. Negative for chills and fever.   HENT:  Negative for sinus pressure and sinus pain.    Respiratory:  Negative for cough and shortness of breath.    Cardiovascular:  Negative for chest pain and leg swelling.   Gastrointestinal:  Positive for abdominal pain and diarrhea.   Genitourinary:  Positive for frequency. Negative for urgency.   Musculoskeletal:  Negative for back pain and neck pain.   Skin:  Negative for color change and pallor.   Neurological:  Negative for numbness and headaches.   Hematological:  Negative for adenopathy. Does not bruise/bleed easily.   Psychiatric/Behavioral:  Negative for agitation and confusion.    Objective:     Vital Signs (Most Recent):  Temp: 98.5 °F (36.9 °C) (04/22/22 2345)  Pulse: 87 (04/22/22 2345)  Resp: (!) 27 (04/22/22 2345)  BP: 138/61 (04/22/22 2345)  SpO2: 98 % (04/22/22 2345)   Vital Signs (24h Range):  Temp:  [98.5 °F (36.9 °C)-99.8 °F (37.7 °C)] 98.5 °F (36.9 °C)  Pulse:  [] 87  Resp:  [18-35] 27  SpO2:  [96 %-99 %] 98 %  BP: (106-146)/(53-76) 138/61   Weight: 73.5 kg (162 lb 0.6 oz)  Body mass index is 28.7 kg/m².    No intake or output data in the 24 hours ending 04/22/22 2346    Physical Exam  Vitals and nursing note reviewed.   Constitutional:       General: She is not in acute distress.     Appearance: Normal appearance. She is  not ill-appearing.   HENT:      Head: Normocephalic and atraumatic.      Mouth/Throat:      Mouth: Mucous membranes are dry.      Pharynx: Oropharynx is clear. No oropharyngeal exudate.   Eyes:      General: No scleral icterus.     Pupils: Pupils are equal, round, and reactive to light.   Cardiovascular:      Rate and Rhythm: Tachycardia present. Rhythm irregular.      Pulses: Normal pulses.      Heart sounds: No murmur heard.    No gallop.   Abdominal:      General: There is no distension.      Palpations: Abdomen is soft.      Tenderness: There is abdominal tenderness.   Musculoskeletal:      Right lower leg: No edema.      Left lower leg: No edema.   Skin:     General: Skin is warm and dry.      Capillary Refill: Capillary refill takes less than 2 seconds.      Coloration: Skin is not jaundiced.      Findings: Bruising present.   Neurological:      General: No focal deficit present.      Mental Status: She is alert and oriented to person, place, and time.      GCS: GCS eye subscore is 4. GCS verbal subscore is 5. GCS motor subscore is 6.      Cranial Nerves: No cranial nerve deficit.      Motor: No weakness.      Comments: Dysarthria s/p CVA   Psychiatric:         Mood and Affect: Mood normal.         Behavior: Behavior normal.       Vents:     Lines/Drains/Airways       Peripheral Intravenous Line  Duration                  Peripheral IV - Single Lumen 04/22/22 1710 20 G Right Forearm <1 day         Peripheral IV - Single Lumen 04/22/22 1741 20 G Left Antecubital <1 day                  Significant Labs:    CBC/Anemia Profile:  Recent Labs   Lab 04/22/22  1719   WBC 26.26*   HGB 13.4   HCT 41.7      MCV 89   RDW 14.6*        Chemistries:  Recent Labs   Lab 04/22/22  1719      K 4.4      CO2 20*   BUN 22*   CREATININE 0.8   CALCIUM 10.4   ALBUMIN 3.4*   PROT 8.4   BILITOT 0.8   ALKPHOS 249*   ALT 66*   AST 44*   MG 2.0   PHOS 3.3       All pertinent labs within the past 24 hours have been  "reviewed.    Significant Imaging: I have reviewed all pertinent imaging results/findings within the past 24 hours.    Assessment/Plan:     Neuro  Cerebrovascular accident (CVA) due to embolism  Recent embolic stroke while traveling in [place]. Treated with thrombolytics. Residual dysarthria. Was in rehab facility here in [place] with plans to discharge Monday. Reports she was doing well with PT/OT and speech. Reports previous non-compliance with ASA.     -- Continue Eliquis   -- PT/OT     Cardiac/Vascular  Chronic systolic heart failure  Reported most recent EF 25%, but will recheck here.     -- Follow up ECHO    Atrial fibrillation with RVR  [id number] Score: 1    6 High Risk:18.2% if no warfarin  5 High Risk: 12.5% if no warfarin  4 High Risk: 8.5% if no warfarin  3 High Risk: 5.9% if no warfarin  2 Intermediate Risk: 4% if no warfarin  1 Intermediate Risk: 2.8% if no warfarin    -- Continuous telemetry   -- EKG for acute changes   -- Mg > 2 , Phos > 3, K > 4   -- Cardiology following. Appreciate assistance   -- START metoprolol 25mg q6h   -- Continue eliquis   -- Follow up formal ECHO               Renal/  Uterine fibroid  Known uterine fibroids. Per radiology CT result "Enlarged uterus with lobular contour and presence of heterogeneous masses consistent with uterine fibroids.  Largest fibroid in the right fundus, approximately 7 cm, appears to be centrally necrotic" --> she reports she has previously had embolization of fibroids. Do not feel this is contributing to presenting symptoms. Can follow up outpatient.     GI  * Diverticulitis  52 year old female with recent CVA, AF, CHF, and known diverticulosis found to have diverticulitis on CT scan. Endorses abdominal pain, poor PO intake, and dysuria. Abdominal tenderness on exam.     -- Stop vancomycin   -- Continue Zosyn   -- Follow up culture data   -- Liquid diet. Advance as tolerated   -- Morphine PRN pain     Elevated LFTs  Unsure of baseline, AST/ALT/Alk " Phos elevated at admission. Congestion vs medication vs acute illness vs ?     -- Follow up hepatitis panel   -- Follow up abdominal u/s   -- CMP daily and trend         Critical Care Daily Checklist:    A: Awake: RASS Goal/Actual Goal:    Actual: N[place]ira Agitation Sedation Scale (RASS): Alert and calm   B: Spontaneous Breathing Trial Performed?     C: SAT & SBT Coordinated?  N/A                      D: Delirium: CAM-ICU Overall CAM-ICU: Negative   E: Early Mobility Performed? Yes   F: Feeding Goal:    Status:     Current Diet Order   Procedures    Diet clear liquid      AS: Analgesia/Sedation PRN    T: Thromboembolic Prophylaxis Eliquis    H: HOB > 300 Yes   U: Stress Ulcer Prophylaxis (if needed)    G: Glucose Control Following    B: Bowel Function Stool Occurrence: 0   I: Indwelling Catheter (Lines & Luong) Necessity PIV    D: De-escalation of Antimicrobials/Pharmacotherapies Continue Zosyn     Plan for the day/ETD Admit to ICU. Continue IV abx. Liquid diet. Pain control.     Code Status:  Family/Goals of Care: Full Code  Updated at bedside      Critical Care Time: 50 minutes    Plan of care discussed with PCCM fellow. To be discussed with Dr. Miller. Attestation to follow.     Critical secondary to Patient has a condition that poses threat to life and bodily function: Atrial fibrillation with rapid ventricular response and diverticulitis     Critical care was time spent personally by me on the following activities: development of treatment plan with patient or surrogate and bedside caregivers, discussions with consultants, evaluation of patient's response to treatment, examination of patient, ordering and performing treatments and interventions, ordering and review of laboratory studies, ordering and review of radiographic studies, pulse oximetry, re-evaluation of patient's condition. This critical care time did not overlap with that of any other provider or involve time for any procedures.     Catarino  ZEESHAN Benavidez  Critical Care Medicine  [facility] - Cardiac Medical ICU

## 2022-04-23 NOTE — PT/OT/SLP EVAL
Occupational Therapy   Co-Evaluation    Name: Maria Dolores Tamayo  MRN: [id number]  Admitting Diagnosis:  Diverticulitis  Recent Surgery: * No surgery found *      Recommendations:     Discharge Recommendations: home (HH speech)  Discharge Equipment Recommendations:  none  Barriers to discharge:  None    Assessment:     Maria Dolores Tamayo is a 52 y.o. female with a medical diagnosis of Diverticulitis.  She was able to perform supine/sit, sit/stand, and walk to sink c SBA/S.  B UE are WFL.  Pt was Ox4 and follows all commands for me.  Pt has some word finding deficits that she was working c SLP on at rehab.  Able to perform grooming task, LB dressing and UB dressing c set-up/min A. Performance deficits affecting function: weakness, impaired endurance, impaired self care skills, impaired functional mobilty, decreased upper extremity function, decreased ROM.      Rehab Prognosis: Good; patient would benefit from acute skilled OT services to address these deficits and reach maximum level of function.       Plan:     Patient to be seen 3 x/week to address the above listed problems via self-care/home management, therapeutic exercises, therapeutic activities  · Plan of Care Expires: 05/23/22  · Plan of Care Reviewed with: patient, family   · Co-tx d/t pt medical complexity, decreased endurance, and to insure pt safety.    Subjective     Chief Complaint: Recent stroke, diverticulitis  Patient/Family Comments/goals: To go home    Occupational Profile:  Living Environment: Pt lives in a townhouse c 15 steps to second floor and has a threshold to enter.  Has a shower seat.  Previous level of function: I PTA  Equipment Used at Home:  none  Assistance upon Discharge: Pt lives c her     Pain/Comfort:  · Pain Rating 1: 0/10    Patients cultural, spiritual, Judaism conflicts given the current situation: no    Objective:     Communicated with: RN prior to session.  Patient found supine with blood pressure cuff, peripheral IV, pulse  ox (continuous), telemetry upon OT entry to room.    General Precautions: Standard, fall   Orthopedic Precautions:N/A   Braces: N/A  Respiratory Status: Room air    Occupational Performance:    Bed Mobility:    · Patient completed Supine to Sit with supervision    Functional Mobility/Transfers:  · Patient completed Sit <> Stand Transfer with supervision  with  no assistive device   · Patient completed Bed <> Chair Transfer using Stand Pivot technique with stand by assistance with no assistive device  · Functional Mobility: Pt was able to walk in room and then walk to sink c S/SBA.    Activities of Daily Living:  · Grooming: supervision to brush teeth, wash off face, and apply chapstick.  · Upper Body Dressing: minimum assistance to don hospital gown.  · Lower Body Dressing: supervision to don/doff socks.    Cognitive/Visual Perceptual:  Cognitive/Psychosocial Skills:     -       Oriented to: Person, Place, Time and Situation   -       Follows Commands/attention:Follows multistep  commands    Physical Exam:  Upper Extremity Range of Motion:     -       Right Upper Extremity: WFL  -       Left Upper Extremity: WFL  Upper Extremity Strength:    -       Right Upper Extremity: WFL  -       Left Upper Extremity: WFL    AMPAC 6 Click ADL:  AMPAC Total Score: 24  Education:    Patient left up in chair with all lines intact, call button in reach and RN notified    GOALS:   Multidisciplinary Problems     Occupational Therapy Goals        Problem: Occupational Therapy    Goal Priority Disciplines Outcome Interventions   Occupational Therapy Goal     OT, PT/OT Ongoing, Progressing    Description: Goals to be met by: 5/23/22     Patient will increase functional independence with ADLs by performing:    UE Dressing with [place].  LE Dressing with [place].  Grooming while standing at sink with [place].  Toileting from toilet with [place] for hygiene and clothing management.   Bathing from  shower chair/bench  with [place].  Toilet transfer to toilet with [place].  Increased functional strength to WFL for B UE.  Upper extremity exercise program x15 reps per handout, with assistance as needed.                     History:     Past Medical History:   Diagnosis Date    Abnormal Pap smear of cervix        Past Surgical History:   Procedure Laterality Date    AUGMENTATION OF BREAST      COSMETIC SURGERY         Time Tracking:     OT Date of Treatment: 04/23/22  OT Start Time: 1029  OT Stop Time: 1053  OT Total Time (min): 24 min    Billable Minutes:Evaluation 12  Self Care/Home Management 12    4/23/2022

## 2022-04-23 NOTE — ASSESSMENT & PLAN NOTE
See af with rvr  Recently diagnosed systolic heart failure with EF of 25% from patient's report.  Currently appears compensated  Would treat underlying infection  Metoprolol tartrate from AF with RVR as patient has no evidence of cardiogenic shock  Consider restarting GDMT when able.

## 2022-04-23 NOTE — PLAN OF CARE
"Hospital Medicine ICU Acceptance Note    Date of Admit: 4/22/2022  Date of Transfer / Stepdown: 4/23/2022  ICU team stepping patient down: MICU  Accepting  team: A    History of Present Illness:     Mrs. Tamayo is a 52 year old female with PMH notable for paroxsymal AF, CHF with most recent EF 25%, recent embolic CVA, dilated cardiomyopathy, and in hospital VT arrest who presented to [facility] ED "because she was alerted by her life vest about an impending shock." In short, she was recently traveling from [place] to [place] where she had an embolic CVA, treated with thrombolytics, she was intubated and sedated. 2 days later had a VT arrest --> attempts were made at ICD placement, however, unsuccessful, therefore she was given a life vest. She has been back in [place] undergoing rehabilitative services when she was alerted by her life vest about an impending shock. She was able to cancel the alert. EMS was called.        Hospital/ICU Course:     EMS reported HR near 200s. In the ED she was given 5mg IV metoprolol x 3 doses with reported improvement to 110-140s. She was evaluated by cardiology in the ED who recommend medical management with metoprolol tartrate 25mg q6h and resuming anticoagulation.      Further ED work up revealed leukocytosis 26, normal lactic acid, troponin 0.034, and elevated LFTs. She was given 500ml IVF, started on broad spectrum abx, and infectious work up sent. CT A/P consistent with sigmoid diverticulitis.     Patient was admitted to ICU. Did not require vasopressors. Continued on zosyn for diverticulitis.     Deemed appropriate for transfer to the floor on 4/23/2022, and was accepted to  team A for further care and management.    Consultants and Procedures:     Consultants:  Consults (From admission, onward)        Status Ordering Provider     Inpatient consult to Critical Care Medicine  Once        Provider:  (Not yet assigned)    Completed PRADEEP DOTSON     Inpatient consult to " Cardiology  Once        Provider:  (Not yet assigned)    Completed PRADEEP DOTSON          Procedures:    As documented    Transfer Information:     Diet:  As ordered    Physical Activity:  As tolerated      Pending plan at time of transfer to the floor:  Continue current plan initiated by ICU, will further monitor and adjust as clinically indicated upon arrival to the floor.      Ke Farrell MD  Department of Hospital Medicine

## 2022-04-23 NOTE — CONSULTS
[facility]  Cardiology  Progress Note     Hospital Length of Stay: 1    Interval History: No acute events overnight. Patient feeling well this morning. Telemetry reviewed. Patient with several episodes of tachycardia >140 bpm after midnight but better controlled and in sinus rhythm for past few hours.    Vitals:  Temp:  [97.6 °F (36.4 °C)-99.8 °F (37.7 °C)] 98.3 °F (36.8 °C)  Pulse:  [] 68  Resp:  [18-35] 18  SpO2:  [96 %-100 %] 97 %  BP: ()/(46-85) 110/58    Intake/Output    Intake/Output Summary (Last 24 hours) at 4/23/2022 1515  Last data filed at 4/23/2022 1300  Gross per 24 hour   Intake 567.17 ml   Output 600 ml   Net -32.83 ml       Physical Exam  Gen: No apparent distress, resting comfortably  HEENT: Pupils equal and reactive to light  Cardio: Regular rate, point of maximal impulse not displaced, no murmur noted, 2+ radial pulses bilaterally, 2+ DP pulses bilaterally  Resp: CTAB, no wheezing  Abd: Soft, non-tender, non-distended  Skin: Warm, dry, no peripheral edema noted, LifeVest in place  Neuro: Alert and oriented x3  Psych: Normal mood and affect    Labs:  Recent Labs   Lab 04/22/22 1719 04/23/22 0225   WBC 26.26* 22.05*   HGB 13.4 10.6*   HCT 41.7 32.6*    366     Recent Labs   Lab 04/22/22  1719 04/23/22 0225    138   K 4.4 3.6    107   CO2 20* 20*   BUN 22* 14   CREATININE 0.8 0.6   GLU 92 98   CALCIUM 10.4 8.5*   MG 2.0 1.8   PHOS 3.3 3.1          Current Meds:   apixaban  5 mg Oral BID    piperacillin-tazobactam (ZOSYN) IVPB  4.5 g Intravenous Q8H       Continuous Infusions:      PRN:  acetaminophen, morphine, sodium chloride 0.9%    Assessment and Plan:    1. A-fib with RVR  -Exacerbation likely being driven by sepsis/acute infection with diverticulitis  -CHADS-VaSC of 4, continue Eliquis 5 mg BiD (patient has taken for two weeks since her stroke)  -Recommending IV amiodarone protocol then PO amiodarone  -Patient was previously on Sotalol but  due to concern that this may have contributed     2. Prior VT arrest at OSH in [place] 2 weeks ago s/p LifeVest  -Per her report, she had an angiogram done after the VT arrest and was told her coronaries were normal  -Need records from outside hospital  -Attempted ICD placement was unsuccessful  -Patient came for LifeVest alarming, no shocks and was asymptomatic. Have reached out to LifeVest representative to obtain strips from that event which showed A-fib with RVR not VT  -Recommend keeping K >4 and Mg >2     3. HF with recovered EF, non-ischemic  -Follow up Echo ordered for this morning  -Re-start GDMT when able from BP standpoint, patient reports being on lisinopril at home    4. Diverticulitis  -Patient on broad spectrum Abx, continue management per primary    Julio César Oliveira MD  [facility] PGY-4    Pager number: [phone]    Staff attestation to follow.    This note was prepared using voice recognition system and may have sound alike errors that may have been overlooked even with proof reading.

## 2022-04-23 NOTE — ASSESSMENT & PLAN NOTE
53 yo F with recent history of CVA, pAF, cardiomyopathy, recent cardiac arrest 2/2 VT presenting with history of low grade fevers, leukocytosis, and AF with RVR.    - AF with rvr likely 2/2 underlying infection.  - aside from HR, pt stable in terms of blood pressure. No evidence of cardiogenic shock  - please treat underlying infection per primary team  - recommend metoprolol tartrate 25 mg q6h to avoid RVR rise back to nearly 200. Can consider holding if BP on lower side or if there is concern for cardiogenic shock (hypotension, cold extremities, volume overload)  - continue anticoagulation  - recommend formal echo in am  - monitor and aggressively replace electrolytes  - will need to reintroduce GDMT once underlying infection in treated. As stated, would start metoprolol 25 mg q6h for now  - given recent history of CVA, would have high threshold to shock, consider dig if BB is causing hypotension and blood pressure low (goal BP map > 65)

## 2022-04-24 PROBLEM — I49.9 ABNORMAL HEART RHYTHM: Status: ACTIVE | Noted: 2022-04-24

## 2022-04-24 PROBLEM — I46.9 CARDIAC ARREST: Status: RESOLVED | Noted: 2022-04-24 | Resolved: 2022-04-24

## 2022-04-24 PROBLEM — I49.9 ABNORMAL HEART RHYTHM: Status: RESOLVED | Noted: 2022-04-24 | Resolved: 2022-04-24

## 2022-04-24 PROBLEM — I46.9 CARDIAC ARREST: Status: ACTIVE | Noted: 2022-04-24

## 2022-04-24 PROBLEM — I47.20 VT (VENTRICULAR TACHYCARDIA): Status: ACTIVE | Noted: 2022-04-24

## 2022-04-24 PROCEDURE — 99233 SBSQ HOSP IP/OBS HIGH 50: CPT | Mod: ,,, | Performed by: STUDENT IN AN ORGANIZED HEALTH CARE EDUCATION/TRAINING PROGRAM

## 2022-04-24 PROCEDURE — 25000003 PHARM REV CODE 250: Performed by: STUDENT IN AN ORGANIZED HEALTH CARE EDUCATION/TRAINING PROGRAM

## 2022-04-24 PROCEDURE — 25000003 PHARM REV CODE 250: Performed by: NURSE PRACTITIONER

## 2022-04-24 PROCEDURE — 63600175 PHARM REV CODE 636 W HCPCS: Performed by: NURSE PRACTITIONER

## 2022-04-24 PROCEDURE — 99232 SBSQ HOSP IP/OBS MODERATE 35: CPT | Mod: ,,, | Performed by: INTERNAL MEDICINE

## 2022-04-24 PROCEDURE — 99233 PR SUBSEQUENT HOSPITAL CARE,LEVL III: ICD-10-PCS | Mod: ,,, | Performed by: STUDENT IN AN ORGANIZED HEALTH CARE EDUCATION/TRAINING PROGRAM

## 2022-04-24 PROCEDURE — 20600001 HC STEP DOWN PRIVATE ROOM

## 2022-04-24 PROCEDURE — 99232 PR SUBSEQUENT HOSPITAL CARE,LEVL II: ICD-10-PCS | Mod: ,,, | Performed by: INTERNAL MEDICINE

## 2022-04-24 PROCEDURE — 63600175 PHARM REV CODE 636 W HCPCS: Performed by: STUDENT IN AN ORGANIZED HEALTH CARE EDUCATION/TRAINING PROGRAM

## 2022-04-24 RX ORDER — AMIODARONE HYDROCHLORIDE 200 MG/1
200 TABLET ORAL DAILY
Status: DISCONTINUED | OUTPATIENT
Start: 2022-05-04 | End: 2022-04-25 | Stop reason: HOSPADM

## 2022-04-24 RX ORDER — AMIODARONE HYDROCHLORIDE 200 MG/1
400 TABLET ORAL 2 TIMES DAILY
Status: DISCONTINUED | OUTPATIENT
Start: 2022-04-24 | End: 2022-04-24

## 2022-04-24 RX ORDER — AMIODARONE HYDROCHLORIDE 200 MG/1
400 TABLET ORAL 2 TIMES DAILY
Status: DISCONTINUED | OUTPATIENT
Start: 2022-04-24 | End: 2022-04-25 | Stop reason: HOSPADM

## 2022-04-24 RX ORDER — LOSARTAN POTASSIUM 25 MG/1
25 TABLET ORAL DAILY
Status: DISCONTINUED | OUTPATIENT
Start: 2022-04-25 | End: 2022-04-25 | Stop reason: HOSPADM

## 2022-04-24 RX ADMIN — ACETAMINOPHEN 650 MG: 325 TABLET ORAL at 06:04

## 2022-04-24 RX ADMIN — MORPHINE SULFATE 2 MG: 2 INJECTION, SOLUTION INTRAMUSCULAR; INTRAVENOUS at 12:04

## 2022-04-24 RX ADMIN — AMIODARONE HYDROCHLORIDE 0.5 MG/MIN: 1.8 INJECTION, SOLUTION INTRAVENOUS at 07:04

## 2022-04-24 RX ADMIN — APIXABAN 5 MG: 5 TABLET, FILM COATED ORAL at 09:04

## 2022-04-24 RX ADMIN — PIPERACILLIN SODIUM AND TAZOBACTAM SODIUM 4.5 G: 4; .5 INJECTION, POWDER, FOR SOLUTION INTRAVENOUS at 11:04

## 2022-04-24 RX ADMIN — PIPERACILLIN SODIUM AND TAZOBACTAM SODIUM 4.5 G: 4; .5 INJECTION, POWDER, FOR SOLUTION INTRAVENOUS at 06:04

## 2022-04-24 RX ADMIN — AMIODARONE HYDROCHLORIDE 400 MG: 200 TABLET ORAL at 09:04

## 2022-04-24 RX ADMIN — PIPERACILLIN SODIUM AND TAZOBACTAM SODIUM 4.5 G: 4; .5 INJECTION, POWDER, FOR SOLUTION INTRAVENOUS at 03:04

## 2022-04-24 NOTE — ASSESSMENT & PLAN NOTE
-Per her report, she had an angiogram done after the VT arrest and was told her coronaries were normal  -Need records from outside hospital to confirm VT; appreciate assistance from family and primary team  -Attempted ICD placement was unsuccessful at OSH due to abnormal collateral branch off subclavian vein  -Patient came for LifeVest alarming; LifeVest strips consistent with AF and NSVT; no indication for use at this time  -If rhythm strips from OSH obtained, please notify EP to confirm/rule-out VT  -No plans to implant ICD at this time  -Continue Amiodarone infusion protocol  -Recommend keeping K >4 and Mg >2   -Would place Neurology consult/referral for further stroke mgmt and follow-up

## 2022-04-24 NOTE — ASSESSMENT & PLAN NOTE
CT with sigmoid diverticulitis.  No evidence of abscess or free intraperitoneal air.  Continue zosyn. Advancing diet as tolerated.

## 2022-04-24 NOTE — PLAN OF CARE
Problem: Adult Inpatient Plan of Care  Goal: Plan of Care Review  Outcome: Ongoing, Progressing  Goal: Patient-Specific Goal (Individualized)  Outcome: Ongoing, Progressing  Goal: Absence of Hospital-Acquired Illness or Injury  Outcome: Ongoing, Progressing  Goal: Readiness for Transition of Care  Outcome: Ongoing, Progressing     Problem: Fall Injury Risk  Goal: Absence of Fall and Fall-Related Injury  Outcome: Ongoing, Progressing     Pt in chair with eyes open, looking at TV and talking to family, Aox4, able to voice needs, expressive aphasia noted, denies pain, no distress noted,  continues on IV ABT, no adverse reactions noted, VS monitored, call light with in reach, instructed to call when assistance needed.

## 2022-04-24 NOTE — PROGRESS NOTES
[facility] - Tele[facility] Stepdown  Cardiology  Progress Note    Patient Name: Maria Dolores Tamayo  MRN: [id number]  Admission Date: 4/22/2022  Hospital Length of Stay: 2 days  Code Status: Full Code   Attending Physician: Ke Ramon*   Primary Care Physician: Jamie Kirkpatrick MD  Expected Discharge Date:   Principal Problem:Diverticulitis    Subjective:     Hospital Course:   Started on amioderone IV infusion protocol      Interval History: Initiated IV amioderone protocol      Latest Reference Range & Units 04/23/22 02:25   WBC 3.90 - 12.70 K/uL 22.05 (H)   RBC 4.00 - 5.40 M/uL 3.64 (L)   Hemoglobin 12.0 - 16.0 g/dL 10.6 (L)   Hematocrit 37.0 - 48.5 % 32.6 (L)   MCV 82 - 98 fL 90      Latest Reference Range & Units 04/23/22 02:25   Sodium 136 - 145 mmol/L 138   Potassium 3.5 - 5.1 mmol/L 3.6   Chloride 95 - 110 mmol/L 107   CO2 23 - 29 mmol/L 20 (L)   Anion Gap 8 - 16 mmol/L 11   BUN 6 - 20 mg/dL 14   Creatinine 0.5 - 1.4 mg/dL 0.6      Latest Reference Range & Units 04/22/22 17:19 04/23/22 02:25   AST 10 - 40 U/L 44 (H) [1] 22   ALT 10 - 44 U/L 66 (H) 46 (H)      Latest Reference Range & Units 04/22/22 17:19 04/22/22 20:13 04/23/22 02:25   BNP 0 - 99 pg/mL 96 [1]     Troponin I 0.000 - 0.026 ng/mL 0.034 (H) [2] 0.037 (H) [3] 0.027 (H) [4]           Review of Systems   Constitutional: Negative for chills, decreased appetite and fever.   HENT:  Negative for congestion and sore throat.    Eyes:  Negative for blurred vision and discharge.   Cardiovascular:  Negative for chest pain, claudication, cyanosis, dyspnea on exertion and leg swelling.   Respiratory:  Negative for cough, hemoptysis and shortness of breath.    Endocrine: Negative for cold intolerance and heat intolerance.   Skin:  Negative for color change.   Musculoskeletal:  Negative for muscle weakness and myalgias.   Gastrointestinal:  Negative for bloating and abdominal pain.   Neurological:  Negative for dizziness, focal weakness and weakness.    Psychiatric/Behavioral:  Negative for altered mental status and depression.    Objective:     Vital Signs (Most Recent):  Temp: 98.1 °F (36.7 °C) (04/24/22 0729)  Pulse: (!) 56 (04/24/22 0729)  Resp: 17 (04/24/22 0729)  BP: (!) 118/56 (04/24/22 0729)  SpO2: 96 % (04/24/22 0729)   Vital Signs (24h Range):  Temp:  [97.6 °F (36.4 °C)-98.3 °F (36.8 °C)] 98.1 °F (36.7 °C)  Pulse:  [] 56  Resp:  [17-34] 17  SpO2:  [94 %-100 %] 96 %  BP: ()/(46-85) 118/56     Weight: 74 kg (163 lb 2.3 oz)  Body mass index is 28.9 kg/m².     SpO2: 96 %  O2 Device (Oxygen Therapy): room air      Intake/Output Summary (Last 24 hours) at 4/24/2022 0800  Last data filed at 4/23/2022 1800  Gross per 24 hour   Intake 530.99 ml   Output 800 ml   Net -269.01 ml       Lines/Drains/Airways       Peripheral Intravenous Line  Duration                  Peripheral IV - Single Lumen 04/22/22 1710 20 G Right Forearm 1 day         Peripheral IV - Single Lumen 04/22/22 1741 20 G Left Antecubital 1 day                    Physical Exam  Constitutional:       Appearance: She is well-developed.   HENT:      Head: Normocephalic and atraumatic.      Right Ear: External ear normal.      Left Ear: External ear normal.   Eyes:      Conjunctiva/sclera: Conjunctivae normal.   Cardiovascular:      Rate and Rhythm: Tachycardia present. Rhythm irregular.      Pulses: Intact distal pulses.           Radial pulses are 2+ on the right side and 2+ on the left side.      Heart sounds: Normal heart sounds.   Pulmonary:      Effort: Pulmonary effort is normal. No respiratory distress.      Breath sounds: Normal breath sounds. No wheezing.   Abdominal:      General: Bowel sounds are normal. There is no distension.      Palpations: Abdomen is soft.      Tenderness: There is no abdominal tenderness.   Musculoskeletal:         General: Normal range of motion.      Cervical back: Normal range of motion and neck supple.   Skin:     General: Skin is warm and dry.    Neurological:      Mental Status: She is alert and oriented to person, place, and time.      Comments: Mild dysarthria   Psychiatric:         Mood and Affect: Mood normal.         Behavior: Behavior normal.       Significant Labs: CMP   Recent Labs   Lab 04/22/22  1719 04/23/22  0225    138   K 4.4 3.6    107   CO2 20* 20*   GLU 92 98   BUN 22* 14   CREATININE 0.8 0.6   CALCIUM 10.4 8.5*   PROT 8.4 6.2   ALBUMIN 3.4* 2.6*   BILITOT 0.8 0.8   ALKPHOS 249* 197*   AST 44* 22   ALT 66* 46*   ANIONGAP 15 11   ESTGFRAFRICA >60.0 >60.0   EGFRNONAA >60.0 >60.0       Significant Imaging: EKG: reviewed    Assessment and Plan:     Brief HPI:   Maria Dolores Tamayo is a 52 y.o. female with paroxsymal AF (diagnosed 13 years ago) along with HF with recovered EF who had a recent admission for CVA in [place]. 2 days following her CVA, she had an in-hospital VT cardiac arrest. Echo at the time reportedly showed EF 25%. They tried to place an ICD but reportedly were unable to pass leads through to heart, so she was given a lifevest and discharged to a rehab facility in [place]. She presents today because she was alerted by her Lifevest about an impending shock, which she was able to cancel. Pt reports that she was not doing any activity in particular and was in her normal state of health in the rehab facility. I was later notified by ER that nursing home MD reported recent fevers and leukocytosis.      EMS notified and found her HR to be close to 200. She was brought to ER and given metoprolol 5 mg iv x 3 doses with improvement in HR to 110-140. Pt again denying any symptoms. Our labs notable for a WBC of 26k. Cardiology consulted for AF with RVR     · The left ventricle is normal in size with eccentric hypertrophy and normal systolic function. The estimated ejection fraction is 55%.  · Normal right ventricular size with normal right ventricular systolic function.  · Mild left atrial enlargement.  · Normal central venous  pressure (3 mmHg).  · Atrial fibrillation present throughout the examination.        Vent. Rate : 089 BPM     Atrial Rate : 080 BPM      P-R Int : 142 ms          QRS Dur : 082 ms       QT Int : 354 ms       P-R-T Axes : 053 032 042 degrees      QTc Int : 430 ms     NSR with PACs and PVCs   Low anterior forces     Cerebrovascular accident (CVA) due to embolism  Continue Apixaban 5 BID     Chronic systolic heart failure  · The left ventricle is normal in size with eccentric hypertrophy and normal systolic function. The estimated ejection fraction is 55%.  · Normal right ventricular size with normal right ventricular systolic function.  · Mild left atrial enlargement.  · Normal central venous pressure (3 mmHg).  · Atrial fibrillation present throughout the examination.      Recommend Losartan 25 as above  Decision on life vest vs icd vs no therapy deferred to EP         Atrial fibrillation with RVR  51 yo F with recent history of CVA, pAF, cardiomyopathy, recent cardiac arrest 2/2 VT presenting with history of low grade fevers, leukocytosis, and AF with RVR.    Outside records at bedside. Most likely etiology of VT was medication induced Torsades from previous antiarrythmic agents prolonging the QT interval at outside hospital     - AF with rvr likely 2/2 underlying infection.  -Exacerbation likely being driven by sepsis/acute infection with diverticulitis  -CHADS-VaSC of 4, continue Eliquis 5 mg BiD (patient has taken for two weeks since her stroke)  - IV amiodarone protocol then PO amiodarone per EP recs   - monitor and aggressively replace electrolytes  - recommend initiation of Losartan 25 mg daily    -Follow up in cardiology clinic            VTE Risk Mitigation (From admission, onward)         Ordered     apixaban tablet 5 mg  2 times daily         04/22/22 2137     IP VTE LOW RISK PATIENT  Once         04/22/22 2135                Aaron Haddad MD  Cardiology  [facility]  The cardiology  team will sign off. Please reach out with any further questions. Thank you for this interesting consult.

## 2022-04-24 NOTE — ASSESSMENT & PLAN NOTE
Prior VT arrest at OSH in [place] 2 weeks ago s/p LifeVest  -Per her report, she had an angiogram done after the VT arrest and was told her coronaries were normal  -Attempted ICD placement was unsuccessful  -Patient came for LifeVest alarming, no shocks and was asymptomatic. Have reached out to LifeVest representative to obtain strips from that event which showed A-fib with RVR not VT  -Recommend keeping K >4 and Mg >2

## 2022-04-24 NOTE — PROGRESS NOTES
[facility]  Cardiac Electrophysiology  Progress Note    Admission Date: 4/22/2022  Code Status: Full Code   Attending Physician: Ke Ramon*   Expected Discharge Date: 4/25/2022  Principal Problem:Diverticulitis    Subjective:     Interval History: No acute events overnight. In sinus bradycardia in the upper 50s on telemetry. No chest pain, SOB, or palps.     Review of Systems   Constitutional: Negative.   HENT: Negative.     Eyes: Negative.    Cardiovascular: Negative.    Respiratory: Negative.     Endocrine: Negative.    Hematologic/Lymphatic: Negative.    Skin: Negative.    Musculoskeletal: Negative.    Gastrointestinal: Negative.    Genitourinary: Negative.    Neurological:         Speech difficulty   Psychiatric/Behavioral: Negative.     Allergic/Immunologic: Negative.    Objective:     Vital Signs (Most Recent):  Temp: 98 °F (36.7 °C) (04/24/22 1100)  Pulse: 79 (04/24/22 1111)  Resp: (!) 21 (04/24/22 1100)  BP: 132/62 (04/24/22 1100)  SpO2: 95 % (04/24/22 1435)   Vital Signs (24h Range):  Temp:  [97.9 °F (36.6 °C)-98.3 °F (36.8 °C)] 98 °F (36.7 °C)  Pulse:  [] 79  Resp:  [17-33] 21  SpO2:  [94 %-99 %] 95 %  BP: (108-132)/(51-81) 132/62     Weight: 74 kg (163 lb 2.3 oz)  Body mass index is 28.9 kg/m².     SpO2: 95 %  O2 Device (Oxygen Therapy): room air    Physical Exam  Constitutional:       General: She is not in acute distress.     Appearance: She is not ill-appearing.      Comments: Pleasant middle age male   HENT:      Head: Normocephalic.      Mouth/Throat:      Mouth: Mucous membranes are moist.   Eyes:      Extraocular Movements: Extraocular movements intact.   Cardiovascular:      Rate and Rhythm: Normal rate and regular rhythm.      Pulses: Normal pulses.      Heart sounds: No murmur heard.  Pulmonary:      Effort: Pulmonary effort is normal. No respiratory distress.      Breath sounds: Normal breath sounds.   Abdominal:      General: Abdomen is flat. Bowel  sounds are normal. There is no distension.      Palpations: Abdomen is soft.      Tenderness: There is no abdominal tenderness.   Musculoskeletal:      Cervical back: Neck supple.      Right lower leg: No edema.      Left lower leg: No edema.   Skin:     General: Skin is warm.   Neurological:      Mental Status: She is alert.      Sensory: No sensory deficit.      Motor: No weakness.      Comments: Mild expressive aphasia   Psychiatric:         Mood and Affect: Mood normal.         Behavior: Behavior normal.       Significant Labs: BMP:   Recent Labs   Lab 04/22/22 1719 04/23/22 0225   GLU 92 98    138   K 4.4 3.6    107   CO2 20* 20*   BUN 22* 14   CREATININE 0.8 0.6   CALCIUM 10.4 8.5*   MG 2.0 1.8   , CMP:   Recent Labs   Lab 04/22/22 1719 04/23/22 0225    138   K 4.4 3.6    107   CO2 20* 20*   GLU 92 98   BUN 22* 14   CREATININE 0.8 0.6   CALCIUM 10.4 8.5*   PROT 8.4 6.2   ALBUMIN 3.4* 2.6*   BILITOT 0.8 0.8   ALKPHOS 249* 197*   AST 44* 22   ALT 66* 46*   ANIONGAP 15 11   ESTGFRAFRICA >60.0 >60.0   EGFRNONAA >60.0 >60.0   , CBC:   Recent Labs   Lab 04/22/22 1719 04/23/22 0225   WBC 26.26* 22.05*   HGB 13.4 10.6*   HCT 41.7 32.6*    366   , INR: No results for input(s): INR, PROTIME in the last 48 hours., Lipid Panel No results for input(s): CHOL, HDL, LDLCALC, TRIG, CHOLHDL in the last 48 hours., and Troponin   Recent Labs   Lab 04/22/22 1719 04/22/22 2013 04/23/22 0225   TROPONINI 0.034* 0.037* 0.027*       Significant Imaging:   TTE 4/23/2022  · The left ventricle is normal in size with eccentric hypertrophy and normal systolic function. The estimated ejection fraction is 55%.  · Normal right ventricular size with normal right ventricular systolic function.  · Mild left atrial enlargement.  · Normal central venous pressure (3 mmHg).  · Atrial fibrillation present throughout the examination.    Assessment and Plan:     VT (ventricular tachycardia)  -Per her report, she  had an angiogram done after the VT arrest and was told her coronaries were normal  -Need records from outside hospital to confirm VT; appreciate assistance from family and primary team  -Attempted ICD placement was unsuccessful at OSH due to abnormal collateral branch off subclavian vein  -Patient came for LifeVest alarming; LifeVest strips consistent with AF and NSVT; no indication for use at this time  -If rhythm strips from OSH obtained, please notify EP to confirm/rule-out VT  -No plans to implant ICD at this time  -Continue Amiodarone infusion protocol  -Recommend keeping K >4 and Mg >2   -Would place Neurology consult/referral for further stroke mgmt and follow-up    Atrial fibrillation with RVR  -Exacerbation likely being driven by sepsis/acute infection with diverticulitis  -CHADS-VaSC of 4, continue Eliquis 5 mg BID (patient has taken for two weeks since her stroke)  -Recommending IV amiodarone protocol then PO amiodarone after 24 hrs  -Patient was previously on Sotalol, but due to concern that this may have contributed (reported long QT in [place] too)  -Needs to follow-up with outpt EP for discussions regarding ablation    Case discussed with Dr Selene Cervantes MD.    Catarino Hardwick MD  Cardiac Electrophysiology  [facility]

## 2022-04-24 NOTE — SUBJECTIVE & OBJECTIVE
Interval History: No acute events overnight. In sinus bradycardia in the upper 50s on telemetry. No chest pain, SOB, or palps.     Review of Systems   Constitutional: Negative.   HENT: Negative.     Eyes: Negative.    Cardiovascular: Negative.    Respiratory: Negative.     Endocrine: Negative.    Hematologic/Lymphatic: Negative.    Skin: Negative.    Musculoskeletal: Negative.    Gastrointestinal: Negative.    Genitourinary: Negative.    Neurological:         Speech difficulty   Psychiatric/Behavioral: Negative.     Allergic/Immunologic: Negative.    Objective:     Vital Signs (Most Recent):  Temp: 98 °F (36.7 °C) (04/24/22 1100)  Pulse: 79 (04/24/22 1111)  Resp: (!) 21 (04/24/22 1100)  BP: 132/62 (04/24/22 1100)  SpO2: 95 % (04/24/22 1435)   Vital Signs (24h Range):  Temp:  [97.9 °F (36.6 °C)-98.3 °F (36.8 °C)] 98 °F (36.7 °C)  Pulse:  [] 79  Resp:  [17-33] 21  SpO2:  [94 %-99 %] 95 %  BP: (108-132)/(51-81) 132/62     Weight: 74 kg (163 lb 2.3 oz)  Body mass index is 28.9 kg/m².     SpO2: 95 %  O2 Device (Oxygen Therapy): room air    Physical Exam  Constitutional:       General: She is not in acute distress.     Appearance: She is not ill-appearing.      Comments: Pleasant middle age male   HENT:      Head: Normocephalic.      Mouth/Throat:      Mouth: Mucous membranes are moist.   Eyes:      Extraocular Movements: Extraocular movements intact.   Cardiovascular:      Rate and Rhythm: Normal rate and regular rhythm.      Pulses: Normal pulses.      Heart sounds: No murmur heard.  Pulmonary:      Effort: Pulmonary effort is normal. No respiratory distress.      Breath sounds: Normal breath sounds.   Abdominal:      General: Abdomen is flat. Bowel sounds are normal. There is no distension.      Palpations: Abdomen is soft.      Tenderness: There is no abdominal tenderness.   Musculoskeletal:      Cervical back: Neck supple.      Right lower leg: No edema.      Left lower leg: No edema.   Skin:     General: Skin  is warm.   Neurological:      Mental Status: She is alert.      Sensory: No sensory deficit.      Motor: No weakness.      Comments: Mild expressive aphasia   Psychiatric:         Mood and Affect: Mood normal.         Behavior: Behavior normal.       Significant Labs: BMP:   Recent Labs   Lab 04/22/22 1719 04/23/22 0225   GLU 92 98    138   K 4.4 3.6    107   CO2 20* 20*   BUN 22* 14   CREATININE 0.8 0.6   CALCIUM 10.4 8.5*   MG 2.0 1.8   , CMP:   Recent Labs   Lab 04/22/22 1719 04/23/22 0225    138   K 4.4 3.6    107   CO2 20* 20*   GLU 92 98   BUN 22* 14   CREATININE 0.8 0.6   CALCIUM 10.4 8.5*   PROT 8.4 6.2   ALBUMIN 3.4* 2.6*   BILITOT 0.8 0.8   ALKPHOS 249* 197*   AST 44* 22   ALT 66* 46*   ANIONGAP 15 11   ESTGFRAFRICA >60.0 >60.0   EGFRNONAA >60.0 >60.0   , CBC:   Recent Labs   Lab 04/22/22 1719 04/23/22 0225   WBC 26.26* 22.05*   HGB 13.4 10.6*   HCT 41.7 32.6*    366   , INR: No results for input(s): INR, PROTIME in the last 48 hours., Lipid Panel No results for input(s): CHOL, HDL, LDLCALC, TRIG, CHOLHDL in the last 48 hours., and Troponin   Recent Labs   Lab 04/22/22 1719 04/22/22 2013 04/23/22 0225   TROPONINI 0.034* 0.037* 0.027*       Significant Imaging:   TTE 4/23/2022  The left ventricle is normal in size with eccentric hypertrophy and normal systolic function. The estimated ejection fraction is 55%.  Normal right ventricular size with normal right ventricular systolic function.  Mild left atrial enlargement.  Normal central venous pressure (3 mmHg).  Atrial fibrillation present throughout the examination.

## 2022-04-24 NOTE — ASSESSMENT & PLAN NOTE
51 yo F with recent history of CVA, pAF, cardiomyopathy, recent cardiac arrest 2/2 VT presenting with history of low grade fevers, leukocytosis, and AF with RVR.    Outside records at bedside. Most likely etiology of VT was medication induced Torsades from previous antiarrythmic agents prolonging the QT interval at outside hospital     - AF with rvr likely 2/2 underlying infection.  -Exacerbation likely being driven by sepsis/acute infection with diverticulitis  -CHADS-VaSC of 4, continue Eliquis 5 mg BiD (patient has taken for two weeks since her stroke)  - IV amiodarone protocol then PO amiodarone per EP recs   - monitor and aggressively replace electrolytes  - recommend initiation of Losartan 25 mg daily    -Follow up in cardiology clinic

## 2022-04-24 NOTE — SUBJECTIVE & OBJECTIVE
Interval History: Initiated IV amioderone protocol      Latest Reference Range & Units 04/23/22 02:25   WBC 3.90 - 12.70 K/uL 22.05 (H)   RBC 4.00 - 5.40 M/uL 3.64 (L)   Hemoglobin 12.0 - 16.0 g/dL 10.6 (L)   Hematocrit 37.0 - 48.5 % 32.6 (L)   MCV 82 - 98 fL 90      Latest Reference Range & Units 04/23/22 02:25   Sodium 136 - 145 mmol/L 138   Potassium 3.5 - 5.1 mmol/L 3.6   Chloride 95 - 110 mmol/L 107   CO2 23 - 29 mmol/L 20 (L)   Anion Gap 8 - 16 mmol/L 11   BUN 6 - 20 mg/dL 14   Creatinine 0.5 - 1.4 mg/dL 0.6      Latest Reference Range & Units 04/22/22 17:19 04/23/22 02:25   AST 10 - 40 U/L 44 (H) [1] 22   ALT 10 - 44 U/L 66 (H) 46 (H)      Latest Reference Range & Units 04/22/22 17:19 04/22/22 20:13 04/23/22 02:25   BNP 0 - 99 pg/mL 96 [1]     Troponin I 0.000 - 0.026 ng/mL 0.034 (H) [2] 0.037 (H) [3] 0.027 (H) [4]           Review of Systems   Constitutional: Negative for chills, decreased appetite and fever.   HENT:  Negative for congestion and sore throat.    Eyes:  Negative for blurred vision and discharge.   Cardiovascular:  Negative for chest pain, claudication, cyanosis, dyspnea on exertion and leg swelling.   Respiratory:  Negative for cough, hemoptysis and shortness of breath.    Endocrine: Negative for cold intolerance and heat intolerance.   Skin:  Negative for color change.   Musculoskeletal:  Negative for muscle weakness and myalgias.   Gastrointestinal:  Negative for bloating and abdominal pain.   Neurological:  Negative for dizziness, focal weakness and weakness.   Psychiatric/Behavioral:  Negative for altered mental status and depression.    Objective:     Vital Signs (Most Recent):  Temp: 98.1 °F (36.7 °C) (04/24/22 0729)  Pulse: (!) 56 (04/24/22 0729)  Resp: 17 (04/24/22 0729)  BP: (!) 118/56 (04/24/22 0729)  SpO2: 96 % (04/24/22 0729)   Vital Signs (24h Range):  Temp:  [97.6 °F (36.4 °C)-98.3 °F (36.8 °C)] 98.1 °F (36.7 °C)  Pulse:  [] 56  Resp:  [17-34] 17  SpO2:  [94 %-100 %] 96  %  BP: ()/(46-85) 118/56     Weight: 74 kg (163 lb 2.3 oz)  Body mass index is 28.9 kg/m².     SpO2: 96 %  O2 Device (Oxygen Therapy): room air      Intake/Output Summary (Last 24 hours) at 4/24/2022 0800  Last data filed at 4/23/2022 1800  Gross per 24 hour   Intake 530.99 ml   Output 800 ml   Net -269.01 ml       Lines/Drains/Airways       Peripheral Intravenous Line  Duration                  Peripheral IV - Single Lumen 04/22/22 1710 20 G Right Forearm 1 day         Peripheral IV - Single Lumen 04/22/22 1741 20 G Left Antecubital 1 day                    Physical Exam  Constitutional:       Appearance: She is well-developed.   HENT:      Head: Normocephalic and atraumatic.      Right Ear: External ear normal.      Left Ear: External ear normal.   Eyes:      Conjunctiva/sclera: Conjunctivae normal.   Cardiovascular:      Rate and Rhythm: Tachycardia present. Rhythm irregular.      Pulses: Intact distal pulses.           Radial pulses are 2+ on the right side and 2+ on the left side.      Heart sounds: Normal heart sounds.   Pulmonary:      Effort: Pulmonary effort is normal. No respiratory distress.      Breath sounds: Normal breath sounds. No wheezing.   Abdominal:      General: Bowel sounds are normal. There is no distension.      Palpations: Abdomen is soft.      Tenderness: There is no abdominal tenderness.   Musculoskeletal:         General: Normal range of motion.      Cervical back: Normal range of motion and neck supple.   Skin:     General: Skin is warm and dry.   Neurological:      Mental Status: She is alert and oriented to person, place, and time.      Comments: Mild dysarthria   Psychiatric:         Mood and Affect: Mood normal.         Behavior: Behavior normal.       Significant Labs: CMP   Recent Labs   Lab 04/22/22  1719 04/23/22  0225    138   K 4.4 3.6    107   CO2 20* 20*   GLU 92 98   BUN 22* 14   CREATININE 0.8 0.6   CALCIUM 10.4 8.5*   PROT 8.4 6.2   ALBUMIN 3.4* 2.6*    BILITOT 0.8 0.8   ALKPHOS 249* 197*   AST 44* 22   ALT 66* 46*   ANIONGAP 15 11   ESTGFRAFRICA >60.0 >60.0   EGFRNONAA >60.0 >60.0       Significant Imaging: EKG: reviewed

## 2022-04-24 NOTE — SUBJECTIVE & OBJECTIVE
Interval History: Pt feels well. Denies abdominal pain. Tolerating full liquid diet. Denies shortness of breath or chest pain.     Review of Systems  Objective:     Vital Signs (Most Recent):  Temp: 98 °F (36.7 °C) (04/24/22 1100)  Pulse: 79 (04/24/22 1111)  Resp: (!) 21 (04/24/22 1100)  BP: 132/62 (04/24/22 1100)  SpO2: 96 % (04/24/22 1207)   Vital Signs (24h Range):  Temp:  [97.9 °F (36.6 °C)-98.3 °F (36.8 °C)] 98 °F (36.7 °C)  Pulse:  [] 79  Resp:  [17-33] 21  SpO2:  [94 %-99 %] 96 %  BP: (104-132)/(49-81) 132/62     Weight: 74 kg (163 lb 2.3 oz)  Body mass index is 28.9 kg/m².    Intake/Output Summary (Last 24 hours) at 4/24/2022 1311  Last data filed at 4/23/2022 1800  Gross per 24 hour   Intake 450 ml   Output 800 ml   Net -350 ml      Physical Exam  Constitutional:       General: She is not in acute distress.     Appearance: Normal appearance. She is not toxic-appearing or diaphoretic.   Cardiovascular:      Rate and Rhythm: Normal rate and regular rhythm.      Heart sounds: No murmur heard.    No friction rub. No gallop.   Pulmonary:      Effort: Pulmonary effort is normal. No respiratory distress.      Breath sounds: Normal breath sounds. No wheezing or rales.   Abdominal:      General: Abdomen is flat. There is no distension.      Palpations: Abdomen is soft.      Tenderness: There is no abdominal tenderness. There is no guarding or rebound.   Musculoskeletal:      Right lower leg: No edema.      Left lower leg: No edema.   Neurological:      Mental Status: She is alert.       Computed MELD-Na score unavailable. Necessary lab results were not found in the last year.  Computed MELD score unavailable. Necessary lab results were not found in the last year.    Significant Labs:  CBC:  Recent Labs   Lab 04/22/22 1719 04/23/22  0225   WBC 26.26* 22.05*   HGB 13.4 10.6*   HCT 41.7 32.6*    366     CMP:  Recent Labs   Lab 04/22/22 1719 04/23/22  0225    138   K 4.4 3.6    107   CO2 20*  20*   GLU 92 98   BUN 22* 14   CREATININE 0.8 0.6   CALCIUM 10.4 8.5*   PROT 8.4 6.2   ALBUMIN 3.4* 2.6*   BILITOT 0.8 0.8   ALKPHOS 249* 197*   AST 44* 22   ALT 66* 46*   ANIONGAP 15 11   EGFRNONAA >60.0 >60.0     PTINR:  No results for input(s): INR in the last 48 hours.    Significant Procedures:   Dobutamine Stress Test with Color Flow: No results found for this or any previous visit.

## 2022-04-24 NOTE — NURSING
"Pt requested nurse to help remove cardiac vest, she stated," The cardiac doctor just said I could", Vest removed.    "

## 2022-04-24 NOTE — ASSESSMENT & PLAN NOTE
· The left ventricle is normal in size with eccentric hypertrophy and normal systolic function. The estimated ejection fraction is 55%.  · Normal right ventricular size with normal right ventricular systolic function.  · Mild left atrial enlargement.  · Normal central venous pressure (3 mmHg).  · Atrial fibrillation present throughout the examination.      Recommend Losartan 25 as above  Decision on life vest vs icd vs no therapy deferred to EP

## 2022-04-24 NOTE — ASSESSMENT & PLAN NOTE
-Exacerbation likely being driven by sepsis/acute infection with diverticulitis  -CHADS-VaSC of 4, continue Eliquis 5 mg BID (patient has taken for two weeks since her stroke)  -Recommending IV amiodarone protocol then PO amiodarone after 24 hrs  -Patient was previously on Sotalol, but due to concern that this may have contributed (reported long QT in [place] too)  -Needs to follow-up with outpt EP for discussions regarding ablation

## 2022-04-24 NOTE — ASSESSMENT & PLAN NOTE
Patient with Paroxysmal (<7 days) atrial fibrillation which is controlled currently with Amiodarone. Patient is currently in sinus rhythm.[id number] Score: 2. Anticoagulation indicated. Anticoagulation done with Eliquis.  Pt has life vest  Continue amiodarone  Echo with apparent improved EF

## 2022-04-24 NOTE — PROGRESS NOTES
"[facility] - Tele[facility] Medicine  Progress Note    Patient Name: Maria Dolores Tamayo  MRN: [id number]  Patient Class: IP- Inpatient   Admission Date: 4/22/2022  Length of Stay: 2 days  Attending Physician: Ke Ramon*  Primary Care Provider: Jamie Kirkpatrick MD        Subjective:     Principal Problem:Diverticulitis        HPI:  Mrs. Tamayo is a 52 year old female with PMH notable for paroxsymal AF, CHF with most recent EF 25%, recent embolic CVA, dilated cardiomyopathy, and a reported in-hospital VT arrest who presented to [facility] ED "because she was alerted by her life vest about an impending shock." In short, she was recently traveling from [place] to [place] where she had an embolic CVA, treated with thrombolytics, she was intubated and sedated. 2 days later had a VT arrest --> attempts were made at ICD placement, however, unsuccessful, therefore she was given a life vest. She has been back in [place] undergoing rehabilitative services when she was alerted by her life vest about an impending shock. She was able to cancel the alert. EMS was called.       Overview/Hospital Course:  EMS reported HR near 200s. In the ED she was given 5mg IV metoprolol x 3 doses with reported improvement to 110-140s. She was evaluated by cardiology in the ED who recommend medical management with metoprolol tartrate 25mg q6h and resuming anticoagulation. Further ED work up revealed leukocytosis 26, normal lactic acid, troponin 0.034, and elevated LFTs. She was given 500ml IVF, started on broad spectrum abx, and infectious work up sent. CT A/P consistent with sigmoid diverticulitis. Additionally, uterus with lobular contour and presence of heterogeneous masses consistent with uterine fibroids. Per radiology read: "largest fibroid in the right fundus, approximately 7 cm, appears to be centrally necrotic." Critical care consulted for managemnet of severe sepsis and tachycardia and patient was admitted to ICU. She " was continued on zosyn. Her troponin peaked at 0.037 before dropping down to 0.027. Cultures remained negative. She was stepped down to hospital medicine 4/23/22. EP consulted and recommended loading with IV amiodarone.       Interval History: Pt feels well. Denies abdominal pain. Tolerating full liquid diet. Denies shortness of breath or chest pain.     Review of Systems  Objective:     Vital Signs (Most Recent):  Temp: 98 °F (36.7 °C) (04/24/22 1100)  Pulse: 79 (04/24/22 1111)  Resp: (!) 21 (04/24/22 1100)  BP: 132/62 (04/24/22 1100)  SpO2: 96 % (04/24/22 1207)   Vital Signs (24h Range):  Temp:  [97.9 °F (36.6 °C)-98.3 °F (36.8 °C)] 98 °F (36.7 °C)  Pulse:  [] 79  Resp:  [17-33] 21  SpO2:  [94 %-99 %] 96 %  BP: (104-132)/(49-81) 132/62     Weight: 74 kg (163 lb 2.3 oz)  Body mass index is 28.9 kg/m².    Intake/Output Summary (Last 24 hours) at 4/24/2022 1311  Last data filed at 4/23/2022 1800  Gross per 24 hour   Intake 450 ml   Output 800 ml   Net -350 ml      Physical Exam  Constitutional:       General: She is not in acute distress.     Appearance: Normal appearance. She is not toxic-appearing or diaphoretic.   Cardiovascular:      Rate and Rhythm: Normal rate and regular rhythm.      Heart sounds: No murmur heard.    No friction rub. No gallop.   Pulmonary:      Effort: Pulmonary effort is normal. No respiratory distress.      Breath sounds: Normal breath sounds. No wheezing or rales.   Abdominal:      General: Abdomen is flat. There is no distension.      Palpations: Abdomen is soft.      Tenderness: There is no abdominal tenderness. There is no guarding or rebound.   Musculoskeletal:      Right lower leg: No edema.      Left lower leg: No edema.   Neurological:      Mental Status: She is alert.       Computed MELD-Na score unavailable. Necessary lab results were not found in the last year.  Computed MELD score unavailable. Necessary lab results were not found in the last year.    Significant  Labs:  CBC:  Recent Labs   Lab 04/22/22  1719 04/23/22 0225   WBC 26.26* 22.05*   HGB 13.4 10.6*   HCT 41.7 32.6*    366     CMP:  Recent Labs   Lab 04/22/22  1719 04/23/22 0225    138   K 4.4 3.6    107   CO2 20* 20*   GLU 92 98   BUN 22* 14   CREATININE 0.8 0.6   CALCIUM 10.4 8.5*   PROT 8.4 6.2   ALBUMIN 3.4* 2.6*   BILITOT 0.8 0.8   ALKPHOS 249* 197*   AST 44* 22   ALT 66* 46*   ANIONGAP 15 11   EGFRNONAA >60.0 >60.0     PTINR:  No results for input(s): INR in the last 48 hours.    Significant Procedures:   Dobutamine Stress Test with Color Flow: No results found for this or any previous visit.      Assessment/Plan:      * Diverticulitis  CT with sigmoid diverticulitis.  No evidence of abscess or free intraperitoneal air.  Continue zosyn. Advancing diet as tolerated.    Atrial fibrillation with RVR  Patient with Paroxysmal (<7 days) atrial fibrillation which is controlled currently with Amiodarone. Patient is currently in sinus rhythm.[id number] Score: 2. Anticoagulation indicated. Anticoagulation done with Eliquis.  Pt has life vest  Continue amiodarone  Echo with apparent improved EF    Cerebrovascular accident (CVA) due to embolism  PT/OT consulted, recommend home health. SLP.         VTE Risk Mitigation (From admission, onward)         Ordered     apixaban tablet 5 mg  2 times daily         04/22/22 2137     IP VTE LOW RISK PATIENT  Once         04/22/22 2135                Discharge Planning   KATIANA:      Code Status: Full Code   Is the patient medically ready for discharge?:     Reason for patient still in hospital (select all that apply): Patient trending condition, Treatment and Consult recommendations             Ke Farrell MD  Department of Hospital Medicine   [facility] - Telemetry Stepdown

## 2022-04-25 ENCOUNTER — TELEPHONE (OUTPATIENT)
Dept: NEUROLOGY | Facility: CLINIC | Age: 52
End: 2022-04-25
Payer: COMMERCIAL

## 2022-04-25 VITALS
WEIGHT: 165.38 LBS | BODY MASS INDEX: 29.3 KG/M2 | HEART RATE: 70 BPM | RESPIRATION RATE: 18 BRPM | TEMPERATURE: 98 F | SYSTOLIC BLOOD PRESSURE: 121 MMHG | HEIGHT: 63 IN | OXYGEN SATURATION: 95 % | DIASTOLIC BLOOD PRESSURE: 60 MMHG

## 2022-04-25 LAB
ALBUMIN SERPL BCP-MCNC: 2.9 G/DL (ref 3.5–5.2)
ALP SERPL-CCNC: 222 U/L (ref 55–135)
ALT SERPL W/O P-5'-P-CCNC: 39 U/L (ref 10–44)
ANION GAP SERPL CALC-SCNC: 11 MMOL/L (ref 8–16)
AST SERPL-CCNC: 24 U/L (ref 10–40)
BASOPHILS # BLD AUTO: 0.06 K/UL (ref 0–0.2)
BASOPHILS NFR BLD: 0.6 % (ref 0–1.9)
BILIRUB SERPL-MCNC: 0.6 MG/DL (ref 0.1–1)
BUN SERPL-MCNC: 10 MG/DL (ref 6–20)
CALCIUM SERPL-MCNC: 9.5 MG/DL (ref 8.7–10.5)
CHLORIDE SERPL-SCNC: 103 MMOL/L (ref 95–110)
CO2 SERPL-SCNC: 26 MMOL/L (ref 23–29)
CREAT SERPL-MCNC: 0.7 MG/DL (ref 0.5–1.4)
DIFFERENTIAL METHOD: ABNORMAL
EOSINOPHIL # BLD AUTO: 0.2 K/UL (ref 0–0.5)
EOSINOPHIL NFR BLD: 1.9 % (ref 0–8)
ERYTHROCYTE [DISTWIDTH] IN BLOOD BY AUTOMATED COUNT: 14.6 % (ref 11.5–14.5)
EST. GFR  (AFRICAN AMERICAN): >60 ML/MIN/1.73 M^2
EST. GFR  (NON AFRICAN AMERICAN): >60 ML/MIN/1.73 M^2
GLUCOSE SERPL-MCNC: 79 MG/DL (ref 70–110)
HCT VFR BLD AUTO: 34.1 % (ref 37–48.5)
HGB BLD-MCNC: 10.9 G/DL (ref 12–16)
IMM GRANULOCYTES # BLD AUTO: 0.06 K/UL (ref 0–0.04)
IMM GRANULOCYTES NFR BLD AUTO: 0.6 % (ref 0–0.5)
LYMPHOCYTES # BLD AUTO: 1.2 K/UL (ref 1–4.8)
LYMPHOCYTES NFR BLD: 12.7 % (ref 18–48)
MAGNESIUM SERPL-MCNC: 2 MG/DL (ref 1.6–2.6)
MCH RBC QN AUTO: 28.2 PG (ref 27–31)
MCHC RBC AUTO-ENTMCNC: 32 G/DL (ref 32–36)
MCV RBC AUTO: 88 FL (ref 82–98)
MONOCYTES # BLD AUTO: 0.7 K/UL (ref 0.3–1)
MONOCYTES NFR BLD: 7.1 % (ref 4–15)
NEUTROPHILS # BLD AUTO: 7.5 K/UL (ref 1.8–7.7)
NEUTROPHILS NFR BLD: 77.1 % (ref 38–73)
NRBC BLD-RTO: 0 /100 WBC
PHOSPHATE SERPL-MCNC: 3.6 MG/DL (ref 2.7–4.5)
PLATELET # BLD AUTO: 402 K/UL (ref 150–450)
PMV BLD AUTO: 11.3 FL (ref 9.2–12.9)
POTASSIUM SERPL-SCNC: 4.4 MMOL/L (ref 3.5–5.1)
PROT SERPL-MCNC: 6.9 G/DL (ref 6–8.4)
RBC # BLD AUTO: 3.87 M/UL (ref 4–5.4)
SODIUM SERPL-SCNC: 140 MMOL/L (ref 136–145)
WBC # BLD AUTO: 9.76 K/UL (ref 3.9–12.7)

## 2022-04-25 PROCEDURE — 99233 SBSQ HOSP IP/OBS HIGH 50: CPT | Mod: ,,, | Performed by: STUDENT IN AN ORGANIZED HEALTH CARE EDUCATION/TRAINING PROGRAM

## 2022-04-25 PROCEDURE — 83735 ASSAY OF MAGNESIUM: CPT | Performed by: NURSE PRACTITIONER

## 2022-04-25 PROCEDURE — 25000003 PHARM REV CODE 250: Performed by: NURSE PRACTITIONER

## 2022-04-25 PROCEDURE — 85025 COMPLETE CBC W/AUTO DIFF WBC: CPT | Performed by: NURSE PRACTITIONER

## 2022-04-25 PROCEDURE — 36415 COLL VENOUS BLD VENIPUNCTURE: CPT | Performed by: NURSE PRACTITIONER

## 2022-04-25 PROCEDURE — 25000003 PHARM REV CODE 250: Performed by: STUDENT IN AN ORGANIZED HEALTH CARE EDUCATION/TRAINING PROGRAM

## 2022-04-25 PROCEDURE — 84100 ASSAY OF PHOSPHORUS: CPT | Performed by: NURSE PRACTITIONER

## 2022-04-25 PROCEDURE — 80053 COMPREHEN METABOLIC PANEL: CPT | Performed by: NURSE PRACTITIONER

## 2022-04-25 PROCEDURE — 63600175 PHARM REV CODE 636 W HCPCS: Performed by: NURSE PRACTITIONER

## 2022-04-25 PROCEDURE — 99233 PR SUBSEQUENT HOSPITAL CARE,LEVL III: ICD-10-PCS | Mod: ,,, | Performed by: STUDENT IN AN ORGANIZED HEALTH CARE EDUCATION/TRAINING PROGRAM

## 2022-04-25 RX ORDER — AMOXICILLIN AND CLAVULANATE POTASSIUM 875; 125 MG/1; MG/1
1 TABLET, FILM COATED ORAL EVERY 12 HOURS
Qty: 20 TABLET | Refills: 0 | Status: SHIPPED | OUTPATIENT
Start: 2022-04-25 | End: 2022-05-05

## 2022-04-25 RX ORDER — AMIODARONE HYDROCHLORIDE 200 MG/1
TABLET ORAL
Qty: 62 TABLET | Refills: 0 | Status: SHIPPED | OUTPATIENT
Start: 2022-04-25 | End: 2022-05-11

## 2022-04-25 RX ORDER — LOSARTAN POTASSIUM 25 MG/1
25 TABLET ORAL DAILY
Qty: 90 TABLET | Refills: 3 | Status: SHIPPED | OUTPATIENT
Start: 2022-04-25 | End: 2022-05-11 | Stop reason: SDUPTHER

## 2022-04-25 RX ORDER — AMIODARONE HYDROCHLORIDE 400 MG/1
TABLET ORAL
Qty: 31 TABLET | Refills: 0 | Status: SHIPPED | OUTPATIENT
Start: 2022-04-25 | End: 2022-04-25 | Stop reason: SDUPTHER

## 2022-04-25 RX ADMIN — PIPERACILLIN SODIUM AND TAZOBACTAM SODIUM 4.5 G: 4; .5 INJECTION, POWDER, FOR SOLUTION INTRAVENOUS at 12:04

## 2022-04-25 RX ADMIN — AMIODARONE HYDROCHLORIDE 400 MG: 200 TABLET ORAL at 08:04

## 2022-04-25 RX ADMIN — PIPERACILLIN SODIUM AND TAZOBACTAM SODIUM 4.5 G: 4; .5 INJECTION, POWDER, FOR SOLUTION INTRAVENOUS at 04:04

## 2022-04-25 RX ADMIN — LOSARTAN POTASSIUM 25 MG: 25 TABLET, FILM COATED ORAL at 09:04

## 2022-04-25 RX ADMIN — APIXABAN 5 MG: 5 TABLET, FILM COATED ORAL at 08:04

## 2022-04-25 NOTE — PLAN OF CARE
[facility] - Telemetry Stepdown      HOME HEALTH ORDERS  FACE TO FACE ENCOUNTER    Patient Name: Maria Dolores Tamayo  YOB: 1970    PCP: Jamie Kirkpatrick MD   PCP Address: [address] [place] [address]  PCP Phone Number: [phone]  PCP Fax: [phone]    Encounter Date: 4/22/22    Admit to Home Health    Diagnoses:  Active Hospital Problems    Diagnosis  POA    *Diverticulitis [K57.92]  Yes    Atrial fibrillation with RVR [I48.91]  Yes     Priority: 2     VT (ventricular tachycardia) [I47.2]  Yes    Elevated LFTs [R79.89]  Yes    Cerebrovascular accident (CVA) due to embolism [I63.9]  Yes    Uterine fibroid [D25.9]  Yes    Chronic systolic heart failure [I50.22]  Unknown      Resolved Hospital Problems    Diagnosis Date Resolved POA    Cardiac arrest [I46.9] 04/24/2022 Unknown    Abnormal heart rhythm [I49.9] 04/24/2022 Yes       Follow Up Appointments:  Future Appointments   Date Time Provider Department Center   5/2/2022  8:40 AM Maxx Noyola MD [facility] CARDVAL [facility]   5/26/2022  3:40 PM Cinda Miguel MD [facility] STROKE8 [facility]       Allergies:Review of patient's allergies indicates:  No Known Allergies    Medications: Review discharge medications with patient and family and provide education.    Current Facility-Administered Medications   Medication Dose Route Frequency Provider Last Rate Last Admin    acetaminophen tablet 650 mg  650 mg Oral Q6H PRN Catarino Benavidez NP   650 mg at 04/24/22 1829    amiodarone tablet 400 mg  400 mg Oral BID Ke Farrell MD   400 mg at 04/25/22 0812    Followed by    [START ON 5/4/2022] amiodarone tablet 200 mg  200 mg Oral Daily Ke Farrell MD        apixaban tablet 5 mg  5 mg Oral BID Catarino Benavidez NP   5 mg at 04/25/22 0812    losartan tablet 25 mg  25 mg Oral Daily Ke Farrell MD        morphine injection 2 mg  2 mg Intravenous Q4H PRN Catarino Benavidez NP   2 mg at 04/24/22 1210     piperacillin-tazobactam 4.5 g in sodium chloride 0.9% 100 mL IVPB (ready to mix system)  4.5 g Intravenous Q8H Catarino Benavidez NP   Stopped at 04/25/22 0808    sodium chloride 0.9% flush 10 mL  10 mL Intravenous PRN Catarino Benavidez NP         Current Discharge Medication List      START taking these medications    Details   amiodarone (PACERONE) 200 MG Tab Take 2 tablets (400 mg total) by mouth 2 (two) times daily for 8 days, THEN 1 tablet (200 mg total) once daily.  Qty: 62 tablet, Refills: 0      amoxicillin-clavulanate 875-125mg (AUGMENTIN) 875-125 mg per tablet Take 1 tablet by mouth every 12 (twelve) hours. for 10 days  Qty: 20 tablet, Refills: 0      apixaban (ELIQUIS) 5 mg Tab Take 1 tablet (5 mg total) by mouth 2 (two) times daily.  Qty: 60 tablet, Refills: 1      losartan (COZAAR) 25 MG tablet Take 1 tablet (25 mg total) by mouth once daily.  Qty: 90 tablet, Refills: 3    Comments: .         CONTINUE these medications which have NOT CHANGED    Details   aspirin 81 MG Chew Take 81 mg by mouth once daily.      COVID-19 vacc,mRNA,Moderna,/PF (MODERNA COVID-19 VACCINE, EUA, IM)          STOP taking these medications       sotalol (BETAPACE) 40 MG tablet Comments:   Reason for Stopping:                 I have seen and examined this patient within the last 30 days. My clinical findings that support the need for the home health skilled services and home bound status are the following:no   Weakness/numbness causing balance and gait disturbance due to Stroke making it taxing to leave home.     Diet:   cardiac diet    Labs:  Report Lab results to PCP.    Referrals/ Consults  Physical Therapy to evaluate and treat. Evaluate for home safety and equipment needs; Establish/upgrade home exercise program. Perform / instruct on therapeutic exercises, gait training, transfer training, and Range of Motion.  Occupational Therapy to evaluate and treat. Evaluate home environment for safety and equipment needs.  Perform/Instruct on transfers, ADL training, ROM, and therapeutic exercises.  Speech Therapy  to evaluate and treat for  Language.    Activities:   activity as tolerated    Nursing:   Agency to admit patient within 24 hours of hospital discharge unless specified on physician order or at patient request    SN to complete comprehensive assessment including routine vital signs. Instruct on disease process and s/s of complications to report to MD. Review/verify medication list sent home with the patient at time of discharge  and instruct patient/caregiver as needed. Frequency may be adjusted depending on start of care date.     Skilled nurse to perform up to 3 visits PRN for symptoms related to diagnosis    Notify MD if SBP > 160 or < 90; DBP > 90 or < 50; HR > 120 or < 50; Temp > 101; O2 < 88%;     Ok to schedule additional visits based on staff availability and patient request on consecutive days within the home health episode.    When multiple disciplines ordered:    Start of Care occurs on Sunday - Wednesday schedule remaining discipline evaluations as ordered on separate consecutive days following the start of care.    Thursday SOC -schedule subsequent evaluations Friday and Monday the following week.     Friday - Saturday SOC - schedule subsequent discipline evaluations on consecutive days starting Monday of the following week.    For all post-discharge communication and subsequent orders please contact patient's primary care physician.       Home Health Aide:  Physical Therapy Three times weekly, Occupational Therapy Three times weekly and Speech Language Pathology Three times weekly    Wound Care Orders  no    I certify that this patient is confined to her home and needs physical therapy, speech therapy and occupational therapy.

## 2022-04-25 NOTE — CARE UPDATE
[facility] scheduled pt's hospital f/u visit on 5/2/22 @ 11:00am, GI on 4/28/22 @ 9:30am, and Electrophysiology appointment on 5/9/22 @ 8:00am.

## 2022-04-25 NOTE — PROGRESS NOTES
[facility]  Electrophysiology  Progress Note     Hospital Length of Stay: 3    Interval History:  Ms Tamayo was seen and examined this AM. No events overnight. Review of telemetry shows normal sinus rhythm over the last 24 hours with no arrhythmias detected. Denies chest pain, dyspnea, dizziness/lightheadedness, palpitations.     Vitals:  Temp:  [97.8 °F (36.6 °C)-98.5 °F (36.9 °C)] 97.8 °F (36.6 °C)  Pulse:  [57-88] 60  Resp:  [16-33] 18  SpO2:  [95 %-99 %] 98 %  BP: (108-148)/(51-87) 148/68    Intake/Output  No intake or output data in the 24 hours ending 04/25/22 0754    Physical Exam:   Constitutional: no acute distress  HEENT: NCAT, EOMI, no scleral icterus  Cardiovascular: Regular rate and rhythm, no murmurs appreciated. 2+ carotid, radial, DP pulses bilaterally    Pulmonary: Clear to auscultation bilaterally   Abdomen: nontender, non-distended   Neuro: alert and oriented, no focal deficits  Extremities: warm, no edema   MSK: no deformities  Integument: intact, no rashes     Labs:  Recent Labs   Lab 04/22/22  1719 04/23/22  0225 04/25/22  0208   WBC 26.26* 22.05* 9.76   HGB 13.4 10.6* 10.9*   HCT 41.7 32.6* 34.1*    366 402     Recent Labs   Lab 04/22/22  1719 04/23/22  0225 04/25/22  0208    138 140   K 4.4 3.6 4.4    107 103   CO2 20* 20* 26   BUN 22* 14 10   CREATININE 0.8 0.6 0.7   GLU 92 98 79   CALCIUM 10.4 8.5* 9.5   MG 2.0 1.8 2.0   PHOS 3.3 3.1 3.6       Imaging:       Current Meds:   amiodarone  400 mg Oral BID    Followed by    [START ON 5/4/2022] amiodarone  200 mg Oral Daily    apixaban  5 mg Oral BID    losartan  25 mg Oral Daily    piperacillin-tazobactam (ZOSYN) IVPB  4.5 g Intravenous Q8H       Continuous Infusions:      PRN:  acetaminophen, morphine, sodium chloride 0.9%    Assessment and Plan:  VT (ventricular tachycardia)  -Per her report, she had an angiogram done after the VT arrest and was told her coronaries were normal  -Will reach out to  cardiology at OSH in [place] regarding 12-leads, strips etc to confirm VT  -Attempted ICD placement was unsuccessful at OSH due to abnormal collateral branch off subclavian vein  -Patient came for LifeVest alarming; LifeVest strips consistent with AF and NSVT; no indication for use at this time  -No plans to implant ICD at this time  -Continue Amiodarone load   -Recommend keeping K >4 and Mg >2        Atrial fibrillation with RVR  -Exacerbation likely being driven by sepsis/acute infection with diverticulitis  -CHADS-VaSC of 4, continue Eliquis 5 mg BID (patient has taken for two weeks since her stroke)  -Continue amiodarone load   -Patient was previously on Sotalol, but due to concern that this may have contributed (reported long QT in [place] too)  -Needs to follow-up with outpt EP for discussions regarding ablation    Chicho Hunter MD  [facility]  Cardiovascular Disease, PGY-IV      Staff attestation to follow.

## 2022-04-25 NOTE — PROGRESS NOTES
Pt Aox4, able to voice needs, no distress noted, no c/o pain, telemetry and IV Dc'd, cardiac vest on, educated on AVS, ambulated to , transported off unit via  accompanied by transport staff.

## 2022-04-25 NOTE — PLAN OF CARE
[facility] - Tele[facility] Stepdown  Discharge Final Note    Primary Care Provider: Jamie Kirkpatrick MD    Expected Discharge Date: 4/25/2022    Final Discharge Note (most recent)     Final Note - 04/25/22 1545        Final Note    Assessment Type Final Discharge Note     Anticipated Discharge Disposition [facility] [place]     Hospital Resources/Appts/Education Provided Appointments scheduled and added to AVS;Provided patient/caregiver with written discharge plan information        Post-Acute Status    Home Health Status Set-up Complete/Auth obtained     Discharge Delays None known at this time                 Important Message from Medicare              Future Appointments   Date Time Provider Department Center   4/28/2022  9:30 AM Renuka Diego NP [facility] COLON [address]   5/2/2022  8:40 AM Maxx Noyola MD [facility] CARDVAL [facility]   5/2/2022 11:00 AM Shane Esparza MD [facility] IM [facility] [facility]   5/9/2022  8:00 AM Nicko Mendiola MD [facility] ARRHYTH [facility]   5/26/2022  3:40 PM Cinda Miguel MD [facility] STROKE8 [facility]     Pt accepted by [facility].  Pt's family will provide transportation home.    [facility] scheduled follow-up appointments for Cardio, PCP, Electrophysiology, and neuro.  Pt requested Dr. MATTY Prescott as PCP but was unable to get appointment.  Clinic will call pt to schedule follow up if one becomes available.  Pt also requested an appointment with Dr. Alvarez for Neurology.  Clinic will call pt to see if they are able to schdule an appointment.  If unable to get either appointments scheduled, pt has PCP and Neuro appointments scheduled with other providers.    4/26/2022   received a call from Cassandra granda/ [facility] in reference to pt's status for  services.  Cassandra advised  pt was set up with [facility] prior to leaving [facility].  Cassandra advised  pt will start services with [facility].  Chart documented to reflect pt will go with [facility].      Noemí Awad LMSW  PRN-  [facility]  Ext. [phone]

## 2022-04-25 NOTE — PLAN OF CARE
[facility] - Tele[facility] Stepdown  Discharge Assessment    Primary Care Provider: Jamie Kirkpatrick MD     Discharge Assessment (most recent)     BRIEF DISCHARGE ASSESSMENT - 04/25/22 1120        Discharge Planning    Assessment Type Discharge Planning Assessment     Resource/Environmental Concerns none     Support Systems Spouse/significant other;Family members     Equipment Currently Used at Home none     Current Living Arrangements home/apartment/condo     Patient/Family Anticipates Transition to home     Patient/Family Anticipated Services at Transition home health care     DME Needed Upon Discharge  none     Discharge Plan A Home;Home Health     Discharge Plan B Home;Home with family                 SW met with pt to discuss discharge plan and recommendations for HH.  Pt reported she only need speech therapy.  Pt agreeable to [facility].  Pt is currently living in [place]:  [address] [place].      Noemí Awad LMSW  PRN-  [facility]  Ext. [phone]

## 2022-04-25 NOTE — PLAN OF CARE
SW met with pt and pt's family to discuss discharge recommendations for .  Pt does not have a preference for HH facility.  Pt agreeable to [facility].  Referral sent.    2:05 PM  Pt accepted by [facility] and will see pt on Tuesday.      [facility] scheduled follow-up appointments for Cardio, PCP, Electrophysiology, and neuro.  Pt requested Dr. MATTY Prescott as PCP but was unable to get appointment.  Clinic will call pt to schedule follow up if one becomes available.  Pt also requested an appointment with Dr. Alvarez for Neurology.  Clinic will call pt to see if they are able to schdule an appointment.  If unable to get either appointments scheduled, pt has PCP and Neuro appointments scheduled with other providers.   Pt notified pt of above information.  Family will provide transportation home.        04/25/22 1117   Post-Acute Status   Post-Acute Authorization Home Health   Home Health Status Referrals Sent   Discharge Delays None known at this time   Discharge Plan   Discharge Plan A Home;Home Health   Discharge Plan B Home with family     Noemí Awad LMSW  PRN-  [facility]  Ext. [phone]

## 2022-04-25 NOTE — TELEPHONE ENCOUNTER
----- Message from Sabina Centeno sent at 4/25/2022  1:25 PM CDT -----  Who Called: Arlen ([facility])     What is the request in detail: Pt needs to schedule a NP appt, hospital follow up. Will be discharged today 4/25/22. Needs to be seen for Vascular Neuro (Stroke,. TIA). Please advise.     Can the clinic reply by [facility]?    Best Call Back Number: [phone]    Additional Information:

## 2022-04-26 DIAGNOSIS — I49.8 OTHER SPECIFIED CARDIAC ARRHYTHMIAS: Primary | ICD-10-CM

## 2022-04-26 DIAGNOSIS — I49.9 VENTRICULAR ARRHYTHMIA: Primary | ICD-10-CM

## 2022-04-26 DIAGNOSIS — I50.42 CHRONIC COMBINED SYSTOLIC AND DIASTOLIC HEART FAILURE: ICD-10-CM

## 2022-04-27 LAB
BACTERIA BLD CULT: NORMAL
BACTERIA BLD CULT: NORMAL
HAV IGM SERPL QL IA: NEGATIVE
HBV CORE IGM SERPL QL IA: NEGATIVE
HBV SURFACE AG SERPL QL IA: NEGATIVE
HCV AB SERPL QL IA: NEGATIVE

## 2022-05-02 ENCOUNTER — HOSPITAL ENCOUNTER (OUTPATIENT)
Dept: CARDIOLOGY | Facility: CLINIC | Age: 52
Discharge: HOME | End: 2022-05-02
Payer: COMMERCIAL

## 2022-05-02 ENCOUNTER — OFFICE VISIT (OUTPATIENT)
Dept: INTERNAL MEDICINE | Facility: CLINIC | Age: 52
End: 2022-05-02
Payer: COMMERCIAL

## 2022-05-02 ENCOUNTER — OFFICE VISIT (OUTPATIENT)
Dept: CARDIOLOGY | Facility: CLINIC | Age: 52
End: 2022-05-02
Payer: COMMERCIAL

## 2022-05-02 VITALS
WEIGHT: 161.19 LBS | DIASTOLIC BLOOD PRESSURE: 68 MMHG | HEART RATE: 41 BPM | HEIGHT: 64 IN | BODY MASS INDEX: 27.52 KG/M2 | OXYGEN SATURATION: 99 % | SYSTOLIC BLOOD PRESSURE: 144 MMHG

## 2022-05-02 VITALS
DIASTOLIC BLOOD PRESSURE: 78 MMHG | SYSTOLIC BLOOD PRESSURE: 122 MMHG | HEART RATE: 44 BPM | HEIGHT: 64 IN | WEIGHT: 161.38 LBS | BODY MASS INDEX: 27.55 KG/M2 | OXYGEN SATURATION: 99 %

## 2022-05-02 DIAGNOSIS — I47.20 VT (VENTRICULAR TACHYCARDIA): ICD-10-CM

## 2022-05-02 DIAGNOSIS — I63.412 CEREBROVASCULAR ACCIDENT (CVA) DUE TO EMBOLISM OF LEFT MIDDLE CEREBRAL ARTERY: ICD-10-CM

## 2022-05-02 DIAGNOSIS — I48.91 ATRIAL FIBRILLATION WITH RVR: ICD-10-CM

## 2022-05-02 DIAGNOSIS — I50.22 CHRONIC SYSTOLIC HEART FAILURE: ICD-10-CM

## 2022-05-02 DIAGNOSIS — I48.0 PAROXYSMAL ATRIAL FIBRILLATION: Primary | ICD-10-CM

## 2022-05-02 DIAGNOSIS — K82.4 GALLBLADDER POLYP: Primary | ICD-10-CM

## 2022-05-02 PROBLEM — R79.89 ELEVATED LFTS: Status: RESOLVED | Noted: 2022-04-23 | Resolved: 2022-05-02

## 2022-05-02 PROCEDURE — 3078F PR MOST RECENT DIASTOLIC BLOOD PRESSURE < 80 MM HG: ICD-10-PCS | Mod: CPTII,S$GLB,, | Performed by: FAMILY MEDICINE

## 2022-05-02 PROCEDURE — 99204 OFFICE O/P NEW MOD 45 MIN: CPT | Mod: S$GLB,,, | Performed by: INTERNAL MEDICINE

## 2022-05-02 PROCEDURE — 3077F PR MOST RECENT SYSTOLIC BLOOD PRESSURE >= 140 MM HG: ICD-10-PCS | Mod: CPTII,S$GLB,, | Performed by: INTERNAL MEDICINE

## 2022-05-02 PROCEDURE — 3078F DIAST BP <80 MM HG: CPT | Mod: CPTII,S$GLB,, | Performed by: INTERNAL MEDICINE

## 2022-05-02 PROCEDURE — 3008F BODY MASS INDEX DOCD: CPT | Mod: CPTII,S$GLB,, | Performed by: INTERNAL MEDICINE

## 2022-05-02 PROCEDURE — 3074F PR MOST RECENT SYSTOLIC BLOOD PRESSURE < 130 MM HG: ICD-10-PCS | Mod: CPTII,S$GLB,, | Performed by: FAMILY MEDICINE

## 2022-05-02 PROCEDURE — 1159F MED LIST DOCD IN RCRD: CPT | Mod: CPTII,S$GLB,, | Performed by: FAMILY MEDICINE

## 2022-05-02 PROCEDURE — 4010F ACE/ARB THERAPY RXD/TAKEN: CPT | Mod: CPTII,S$GLB,, | Performed by: FAMILY MEDICINE

## 2022-05-02 PROCEDURE — 3077F SYST BP >= 140 MM HG: CPT | Mod: CPTII,S$GLB,, | Performed by: INTERNAL MEDICINE

## 2022-05-02 PROCEDURE — 4010F PR ACE/ARB THEARPY RXD/TAKEN: ICD-10-PCS | Mod: CPTII,S$GLB,, | Performed by: FAMILY MEDICINE

## 2022-05-02 PROCEDURE — 4010F ACE/ARB THERAPY RXD/TAKEN: CPT | Mod: CPTII,S$GLB,, | Performed by: INTERNAL MEDICINE

## 2022-05-02 PROCEDURE — 1111F DSCHRG MED/CURRENT MED MERGE: CPT | Mod: CPTII,S$GLB,, | Performed by: INTERNAL MEDICINE

## 2022-05-02 PROCEDURE — 93010 ELECTROCARDIOGRAM REPORT: CPT | Mod: S$GLB,,, | Performed by: INTERNAL MEDICINE

## 2022-05-02 PROCEDURE — 1159F PR MEDICATION LIST DOCUMENTED IN MEDICAL RECORD: ICD-10-PCS | Mod: CPTII,S$GLB,, | Performed by: FAMILY MEDICINE

## 2022-05-02 PROCEDURE — 99213 PR OFFICE/OUTPT VISIT, EST, LEVL III, 20-29 MIN: ICD-10-PCS | Mod: S$GLB,,, | Performed by: FAMILY MEDICINE

## 2022-05-02 PROCEDURE — 99999 PR PBB SHADOW E&M-EST. PATIENT-LVL III: ICD-10-PCS | Mod: PBBFAC,,, | Performed by: FAMILY MEDICINE

## 2022-05-02 PROCEDURE — 3008F PR BODY MASS INDEX (BMI) DOCUMENTED: ICD-10-PCS | Mod: CPTII,S$GLB,, | Performed by: FAMILY MEDICINE

## 2022-05-02 PROCEDURE — 93010 EKG 12-LEAD: ICD-10-PCS | Mod: S$GLB,,, | Performed by: INTERNAL MEDICINE

## 2022-05-02 PROCEDURE — 3008F BODY MASS INDEX DOCD: CPT | Mod: CPTII,S$GLB,, | Performed by: FAMILY MEDICINE

## 2022-05-02 PROCEDURE — 3008F PR BODY MASS INDEX (BMI) DOCUMENTED: ICD-10-PCS | Mod: CPTII,S$GLB,, | Performed by: INTERNAL MEDICINE

## 2022-05-02 PROCEDURE — 99999 PR PBB SHADOW E&M-EST. PATIENT-LVL III: ICD-10-PCS | Mod: PBBFAC,,, | Performed by: INTERNAL MEDICINE

## 2022-05-02 PROCEDURE — 99999 PR PBB SHADOW E&M-EST. PATIENT-LVL III: CPT | Mod: PBBFAC,,, | Performed by: INTERNAL MEDICINE

## 2022-05-02 PROCEDURE — 4010F PR ACE/ARB THEARPY RXD/TAKEN: ICD-10-PCS | Mod: CPTII,S$GLB,, | Performed by: INTERNAL MEDICINE

## 2022-05-02 PROCEDURE — 99204 PR OFFICE/OUTPT VISIT, NEW, LEVL IV, 45-59 MIN: ICD-10-PCS | Mod: S$GLB,,, | Performed by: INTERNAL MEDICINE

## 2022-05-02 PROCEDURE — 99213 OFFICE O/P EST LOW 20 MIN: CPT | Mod: S$GLB,,, | Performed by: FAMILY MEDICINE

## 2022-05-02 PROCEDURE — 1159F PR MEDICATION LIST DOCUMENTED IN MEDICAL RECORD: ICD-10-PCS | Mod: CPTII,S$GLB,, | Performed by: INTERNAL MEDICINE

## 2022-05-02 PROCEDURE — 93005 ELECTROCARDIOGRAM TRACING: CPT | Mod: S$GLB,,, | Performed by: INTERNAL MEDICINE

## 2022-05-02 PROCEDURE — 93005 EKG 12-LEAD: ICD-10-PCS | Mod: S$GLB,,, | Performed by: INTERNAL MEDICINE

## 2022-05-02 PROCEDURE — 3074F SYST BP LT 130 MM HG: CPT | Mod: CPTII,S$GLB,, | Performed by: FAMILY MEDICINE

## 2022-05-02 PROCEDURE — 3078F DIAST BP <80 MM HG: CPT | Mod: CPTII,S$GLB,, | Performed by: FAMILY MEDICINE

## 2022-05-02 PROCEDURE — 3078F PR MOST RECENT DIASTOLIC BLOOD PRESSURE < 80 MM HG: ICD-10-PCS | Mod: CPTII,S$GLB,, | Performed by: INTERNAL MEDICINE

## 2022-05-02 PROCEDURE — 99999 PR PBB SHADOW E&M-EST. PATIENT-LVL III: CPT | Mod: PBBFAC,,, | Performed by: FAMILY MEDICINE

## 2022-05-02 PROCEDURE — 1111F PR DISCHARGE MEDS RECONCILED W/ CURRENT OUTPATIENT MED LIST: ICD-10-PCS | Mod: CPTII,S$GLB,, | Performed by: FAMILY MEDICINE

## 2022-05-02 PROCEDURE — 1111F PR DISCHARGE MEDS RECONCILED W/ CURRENT OUTPATIENT MED LIST: ICD-10-PCS | Mod: CPTII,S$GLB,, | Performed by: INTERNAL MEDICINE

## 2022-05-02 PROCEDURE — 1111F DSCHRG MED/CURRENT MED MERGE: CPT | Mod: CPTII,S$GLB,, | Performed by: FAMILY MEDICINE

## 2022-05-02 PROCEDURE — 1159F MED LIST DOCD IN RCRD: CPT | Mod: CPTII,S$GLB,, | Performed by: INTERNAL MEDICINE

## 2022-05-02 NOTE — PROGRESS NOTES
Interventional Cardiology Clinic Note  Reason for Visit: Chronic systolic heart failure, establish [facility]  Referring physician:Self referral  Last office visit: New patient    HPI:   Ms. Maria Dolores Tamayo is a 52 y.o. woman with history of atrial fibrillation, cardiomyopathy with recovery of EF, recent CVA who presents to [facility]. Patient reports that she was first diagnosed with atrial fibrillation and cardiomyopathy with EF 15% 13 years ago. She was treated with medications including sotalol and cardioversion with recovery of EF. She was doing well till about a month ago on 4/7/2022 when she was on a private jet from [place] to [place] when she started experiencing aphasia and right sided weakness. Her flight was diverted and she was taken to [facility] in [place]. She received tPA, CTA performed of head and neck was negative for any significant stenosis. She was not taking any anticoagulation for atrial fibrillation at time of CVA. Workup also revealed that her EF was 25%. She also suffered a cardiac arrest in CT scanner and had V.Fib per repot-she had successful ROSC with defibrillation x 3, amiodarone and epinephrine. Angiogram performed showed normal coronary arteries. ICD placement attempted at OSH but was unsuccessful due to left subclavian occlusion. She was discharged on LIFEVEST to rehab in [place]. She got admitted again 4/22/22 from rehab to [facility] due to lifevest alarms. She was seen by EP who couldn't verify she had VT. Life vest interrogation revealed atrial fibrillation. She was placed on amiodarone. A repeat echo showed normal EF. She was discharged on rehab and plan to do cardiac MRI by Dr Cervantes. Since discharge she has been feeling well without shortness of breath, chest pain, dizziness, palpitations, syncope, bleeding. She is adherent with her medications including eliquis.         ROS:    Review of Systems   Constitutional: Negative for  decreased appetite and fever.   HENT: Negative for hoarse voice and nosebleeds.    Eyes: Negative for blurred vision and photophobia.   Cardiovascular: Negative for chest pain, dyspnea on exertion, irregular heartbeat, orthopnea and palpitations.   Respiratory: Negative for cough and shortness of breath.    Hematologic/Lymphatic: Negative for bleeding problem. Does not bruise/bleed easily.   Skin: Negative for itching and rash.   Musculoskeletal: Negative for joint swelling and neck pain.   Gastrointestinal: Negative for abdominal pain, hematemesis, hematochezia, nausea and vomiting.   Genitourinary: Negative for hematuria.   Neurological: Negative for light-headedness and seizures.   Psychiatric/Behavioral: Negative for altered mental status. The patient is not nervous/anxious.      PMH:     Past Medical History:   Diagnosis Date    Abnormal Pap smear of cervix      Past Surgical History:   Procedure Laterality Date    AUGMENTATION OF BREAST      COSMETIC SURGERY       Allergies:   Review of patient's allergies indicates:  No Known Allergies  Medications:     Current Outpatient Medications on File Prior to Visit   Medication Sig Dispense Refill    amiodarone (PACERONE) 200 MG Tab Take 2 tablets (400 mg total) by mouth 2 (two) times daily for 8 days, THEN 1 tablet (200 mg total) once daily. 62 tablet 0    amoxicillin-clavulanate 875-125mg (AUGMENTIN) 875-125 mg per tablet Take 1 tablet by mouth every 12 (twelve) hours. for 10 days 20 tablet 0    apixaban (ELIQUIS) 5 mg Tab Take 1 tablet (5 mg total) by mouth 2 (two) times daily. 60 tablet 1    losartan (COZAAR) 25 MG tablet Take 1 tablet (25 mg total) by mouth once daily. 90 tablet 3    aspirin 81 MG Chew Take 81 mg by mouth once daily.      COVID-19 vacc,mRNA,Moderna,/PF (MODERNA COVID-19 VACCINE, EUA, IM)        No current facility-administered medications on file prior to visit.     Social History:     Social History     Tobacco Use    Smoking status:  "Never Smoker    Smokeless tobacco: Never Used   Substance Use Topics    Alcohol use: Not Currently     Comment: rare     Family History:     Family History   Problem Relation Age of Onset    Ovarian cancer Maternal Aunt 60    Breast cancer Neg Hx     Cancer Neg Hx     Colon cancer Neg Hx      Physical Exam:   BP (!) 144/68 (BP Location: Right arm, Patient Position: Sitting, BP Method: Large (Automatic))   Pulse (!) 41   Ht 5' 4" (1.626 m)   Wt 73.1 kg (161 lb 2.5 oz)   LMP 02/02/2022 (Approximate)   SpO2 99%   BMI 27.66 kg/m²      Physical Exam  General: alert, awake and oriented x 3  Eyes:PERRL.   Neck:no JVD   Lungs:  clear to auscultation bilaterally   Cardiovascular: Heart: regular rate and rhythm, S1, S2 normal, no murmur, click, rub or gallop.   Chest Wall: no tenderness.   Pulses-2+ radial, femoral, DP, PT b/l  Extremities: no cyanosis or edema.   Abdomen/Rectal: Abdomen: soft, non-tender non-distented; bowel sounds normal  Neurologic: Normal strength and tone. Mild speech impediment.  Labs:     Lab Results   Component Value Date     04/25/2022    K 4.4 04/25/2022     04/25/2022    CO2 26 04/25/2022    BUN 10 04/25/2022    CREATININE 0.7 04/25/2022    ANIONGAP 11 04/25/2022     No results found for: HGBA1C  Lab Results   Component Value Date    BNP 96 04/22/2022    Lab Results   Component Value Date    WBC 9.76 04/25/2022    HGB 10.9 (L) 04/25/2022    HCT 34.1 (L) 04/25/2022     04/25/2022    GRAN 7.5 04/25/2022    GRAN 77.1 (H) 04/25/2022     No results found for: CHOL, HDL, LDLCALC, TRIG           Imaging:     EF   Date Value Ref Range Status   04/23/2022 55 % Final       Assessment:     1. Paroxysmal atrial fibrillation   -Currently in sinus rhythm  -continue eliquis and amiodarone  -will get EKG today  -EKG 4/22/22 shows atrial fibrillation with rates on 190s-will repeat ekg today-concern for aberrant pathway   -will discuss with EP about timing for ablation     2. Chronic " systolic heart failure   -EF recovered on most recent echo  -Euvolemic on exam and denies any HF symptoms  -continue losartan   -not on BB due to HR in 40s     3. Cerebrovascular accident (CVA) due to embolism of left middle cerebral artery   -mild residual from recent CVA  -likely due to not being on AC with atrial fibrillation  -continue eliquis  -Will get neurology to evaluate patient       Plan:     -continue eliquis and amiodarone  -EKG 4/22/22 shows atrial fibrillation with rates on 190s-will repeat ekg today-concern for aberrant pathway   -will discuss with EP about timing for ablation  -Will get neurology to evaluate patient     KAMLA SPANGLER  INTERVENTIONAL CARDIOLOGY FELLOW, PGY 8  [phone]    Staff:  I have personally taken the history and examined this patient and agree with the fellow's note as stated above and amended it accordingly :-)  This patient has afib with very fast RVR (200/min) and has had cardiac arrest on one occasion by report in [place] one month ago when she presented with stroke.  Although we are lacking in rhythm strips from that event, she definitely had a L hemispheric stroke with persistent expressive aphasia and had a Cardiac arrest requiring CPR and shocking in the CT scanner in [place].  She was sent home with a lifevest which alarmed and brought her here from our rehab facility last week with afib and a rate of 193.  No delta wave seen on one EKG.      IMP:    1. Afib with RVR:  Faster than expected.  Suspect aberrant pathway facilitating conduction at a rapid rate with documented cardiac arrest requiring defibrillation.      Rec:  1. Stroke consultation:  How soon can we safely do ablation of the afib and aberrant pathway (either or both)?  2. EP consultation:  How soon can you do this since she has a life-threatening atrial arrhythmia?    Addendum:  Coronaries loaded on SR Osirix.  Normal.  EF looks pretty good too (45% or so).

## 2022-05-02 NOTE — PROGRESS NOTES
"Subjective:       Patient ID: Maria Dolores Tamayo is a 52 y.o. female.    Chief Complaint: Hospital Follow Up    Patient is here for HOSP follow-up.   D/[facility] summary not [facility] in [facility] but hosp visit well summarized in Dr Noyola's note who saw her earlier today:  "  HPI:   Ms. Maria Dolores Tamayo is a 52 y.o. woman with history of atrial fibrillation, cardiomyopathy with recovery of EF, recent CVA who presents to [facility]. Patient reports that she was first diagnosed with atrial fibrillation and cardiomyopathy with EF 15% 13 years ago. She was treated with medications including sotalol and cardioversion with recovery of EF. She was doing well till about a month ago on 4/7/2022 when she was on a private jet from [place] to [place] when she started experiencing aphasia and right sided weakness. Her flight was diverted and she was taken to [facility] in [place]. She received tPA, CTA performed of head and neck was negative for any significant stenosis. She was not taking any anticoagulation for atrial fibrillation at time of CVA. Workup also revealed that her EF was 25%. She also suffered a cardiac arrest in CT scanner and had V.Fib per repot-she had successful ROSC with defibrillation x 3, amiodarone and epinephrine. Angiogram performed showed normal coronary arteries. ICD placement attempted at OSH but was unsuccessful due to left subclavian occlusion. She was discharged on LIFEVEST to rehab in [place]. She got admitted again 4/22/22 from rehab to [facility] due to lifevest alarms. She was seen by EP who couldn't verify she had VT. Life vest interrogation revealed atrial fibrillation. She was placed on amiodarone. A repeat echo showed normal EF. She was discharged on rehab and plan to do cardiac MRI by Dr Cervantes. Since discharge she has been feeling well without shortness of breath, chest pain, dizziness, palpitations, syncope, bleeding. She is adherent with her medications " "including eliquis.          Staff:  I have personally taken the history and examined this patient and agree with the fellow's note as stated above and amended it accordingly :-)  This patient has afib with very fast RVR (200/min) and has had cardiac arrest on one occasion by report in [place] one month ago when she presented with stroke.  Although we are lacking in rhythm strips from that event, she definitely had a L hemispheric stroke with persistent expressive aphasia and had a Cardiac arrest requiring CPR and shocking in the CT scanner in [place].  She was sent home with a lifevest which alarmed and brought her here from our rehab facility last week with afib and a rate of 193.  No delta wave seen on one EKG.       IMP:    1. Afib with RVR:  Faster than expected.  Suspect aberrant pathway facilitating conduction at a rapid rate with documented cardiac arrest requiring defibrillation.       Rec:  1. Stroke consultation:  How soon can we safely do ablation of the afib and aberrant pathway (either or both)?  2. EP consultation:  How soon can you do this since she has a life-threatening atrial arrhythmia?         Electronically signed by Maxx Noyola MD at 5/2/2022 10:04 AM  "    Dr Noyola already arranged for neurology and EP visits    Reviewing all labs and tests done only remaining loose end is the gallbladder polyp of 6mm found on US. Using UpTpDate algorithm she should have gallbladder out or if choosing not to then repeat US in 6 mo.   Clearly she has to deal with the EP issues (ablation or if VT detected defib inplant) and have that stable before considering gallbladder surgery as preventive for possible gallbladder Ca developing - her only risk factor is age>51yo  So likely plan on repeat US around 10/23/22 unless she has talked to GI/gen surgery by then and decided to proceed with getting gallbladder out    On Augmentin for diverticulitis    Social History     Tobacco Use    Smoking status: Never " Smoker    Smokeless tobacco: Never Used   Substance Use Topics    Alcohol use: Not Currently     Comment: rare    Drug use: No       Family History   Problem Relation Age of Onset    Ovarian cancer Maternal Aunt 60    Breast cancer Neg Hx     Cancer Neg Hx     Colon cancer Neg Hx        Past Surgical History:   Procedure Laterality Date    AUGMENTATION OF BREAST      COSMETIC SURGERY         Patient Active Problem List   Diagnosis    Atrial fibrillation with RVR    Chronic systolic heart failure    Elevated LFTs    Diverticulitis    Cerebrovascular accident (CVA) due to embolism    Uterine fibroid    VT (ventricular tachycardia)       Current Outpatient Medications on File Prior to Visit   Medication Sig Dispense Refill    amiodarone (PACERONE) 200 MG Tab Take 2 tablets (400 mg total) by mouth 2 (two) times daily for 8 days, THEN 1 tablet (200 mg total) once daily. 62 tablet 0    amoxicillin-clavulanate 875-125mg (AUGMENTIN) 875-125 mg per tablet Take 1 tablet by mouth every 12 (twelve) hours. for 10 days 20 tablet 0    apixaban (ELIQUIS) 5 mg Tab Take 1 tablet (5 mg total) by mouth 2 (two) times daily. 60 tablet 1    losartan (COZAAR) 25 MG tablet Take 1 tablet (25 mg total) by mouth once daily. 90 tablet 3    aspirin 81 MG Chew Take 81 mg by mouth once daily.      COVID-19 vacc,mRNA,Moderna,/PF (MODERNA COVID-19 VACCINE, EUA, IM)        No current facility-administered medications on file prior to visit.           Review of Systems   Constitutional: Negative for chills and fever.   HENT: Negative for ear pain.    Eyes: Negative for pain.   Respiratory: Negative for chest tightness.    Cardiovascular: Negative for chest pain.   Gastrointestinal: Negative for abdominal pain.   Genitourinary: Negative for flank pain.   Musculoskeletal: Negative for gait problem.   Neurological: Negative for syncope.   Psychiatric/Behavioral: Negative for behavioral problems.       Objective:     /78 (BP  "Location: Left arm, Patient Position: Sitting, BP Method: Large (Manual))   Pulse (!) 44   Ht 5' 4" (1.626 m)   Wt 73.2 kg (161 lb 6 oz)   SpO2 99%   BMI 27.70 kg/m²     Physical Exam  Vitals and nursing note reviewed.   Constitutional:       Appearance: She is well-developed.   HENT:      Head: Normocephalic and atraumatic.   Cardiovascular:      Rate and Rhythm: Bradycardia present.   Pulmonary:      Effort: No respiratory distress.      Breath sounds: Normal breath sounds. No wheezing or rales.   Abdominal:      Palpations: Abdomen is soft.   Musculoskeletal:      Cervical back: Neck supple.   Skin:     General: Skin is dry.   Neurological:      Mental Status: She is alert.   Psychiatric:         Behavior: Behavior normal.         Assessment:       1. Gallbladder polyp    2. Atrial fibrillation with RVR    3. VT (ventricular tachycardia)    4. Cerebrovascular accident (CVA) due to embolism of left middle cerebral artery        Plan:       Maria Dolores was seen today for hospital follow up.    Diagnoses and all orders for this visit:    Gallbladder polyp  gallbladder polyp of 6mm found on US. Using UpTpDate algorithm she should have gallbladder out or if choosing not to then repeat US in 6 mo.   Clearly she has to deal with the EP issues (ablation or if VT detected defib inplant) and have that stable before considering gallbladder surgery as preventive for possible gallbladder Ca developing - her only risk factor is age>51yo  So likely plan on repeat US around 10/23/22 unless she has talked to GI/gen surgery by then and decided to proceed with getting gallbladder out    Atrial fibrillation with RVR  VT (ventricular tachycardia)  Cerebrovascular accident (CVA) due to embolism of left middle cerebral artery  -keep plan as worked out with Dr Noyola this morning          "

## 2022-05-03 ENCOUNTER — TELEPHONE (OUTPATIENT)
Dept: ELECTROPHYSIOLOGY | Facility: CLINIC | Age: 52
End: 2022-05-03
Payer: COMMERCIAL

## 2022-05-07 NOTE — DISCHARGE SUMMARY
"[facility] - Telemetry [facility] Medicine  Discharge Summary      Patient Name: Maria Dolores Tamayo  MRN: [id number]  Patient Class: IP- Inpatient  Admission Date: 4/22/2022  Hospital Length of Stay: 3 days  Discharge Date and Time: 4/25/22  Attending Physician: No att. providers found   Discharging Provider: Ke Farrell MD  Primary Care Provider: Jamie Kirkpatrick MD  Hospital Medicine Team: [facility] MED A Ke Farrell MD    HPI:   Mrs. Tamayo is a 52 year old female with PMH notable for paroxsymal AF, CHF with most recent EF 25%, recent embolic CVA, dilated cardiomyopathy, and a reported in-hospital VT arrest who presented to [facility] ED "because she was alerted by her life vest about an impending shock." In short, she was recently traveling from [place] to [place] where she had an embolic CVA, treated with thrombolytics, she was intubated and sedated. 2 days later had a VT arrest --> attempts were made at ICD placement, however, unsuccessful, therefore she was given a life vest. She has been back in [place] undergoing rehabilitative services when she was alerted by her life vest about an impending shock. She was able to cancel the alert. EMS was called.       * No surgery found *      Hospital Course:   EMS reported HR near 200s. In the ED she was given 5mg IV metoprolol x 3 doses with reported improvement to 110-140s. She was evaluated by cardiology in the ED who recommend medical management with metoprolol tartrate 25mg q6h and resuming anticoagulation. Further ED work up revealed leukocytosis 26, normal lactic acid, troponin 0.034, and elevated LFTs. She was given 500ml IVF, started on broad spectrum abx, and infectious work up sent. CT A/P consistent with sigmoid diverticulitis. Additionally, uterus with lobular contour and presence of heterogeneous masses consistent with uterine fibroids. Per radiology read: "largest fibroid in the right fundus, approximately 7 cm, appears to be centrally " "necrotic." Critical care consulted for managemnet of severe sepsis and tachycardia and patient was admitted to ICU. She was continued on zosyn. Her troponin peaked at 0.037 before dropping down to 0.027. Cultures remained negative. She was stepped down to hospital medicine 4/23/22. EP consulted and recommended loading with IV amiodarone. She was concerted to po. Per cardiology, upon review of lifevest alerts, appeared that were all atrial fibrillation. Patient deemed ready for discharge. Plan discussed with pt, who was agreeable and amenable; medications were discussed and reviewed, outpatient follow-up arranged, ER precautions were given, all questions were answered to the pt's satisfaction, and Maria Dolores Tamayo  was subsequently discharged.         Goals of Care Treatment Preferences:  Code Status: Full Code      Consults:   Consults (From admission, onward)        Status Ordering Provider     Inpatient consult to Electrophysiology  Once        Provider:  (Not yet assigned)    Completed NICHOLAS AMEZQUITA     Inpatient consult to Critical Care Medicine  Once        Provider:  (Not yet assigned)    Completed PRADEEP DOTSON     Inpatient consult to Cardiology  Once        Provider:  (Not yet assigned)    Completed PRADEEP DOTSON            Final Active Diagnoses:    Diagnosis Date Noted POA    PRINCIPAL PROBLEM:  Diverticulitis [K57.92] 04/23/2022 Yes    Atrial fibrillation with RVR [I48.91] 04/22/2022 Yes    VT (ventricular tachycardia) [I47.2] 04/24/2022 Yes    Cerebrovascular accident (CVA) due to embolism [I63.9] 04/23/2022 Yes    Uterine fibroid [D25.9] 04/23/2022 Yes    Chronic systolic heart failure [I50.22] 04/22/2022 Unknown      Problems Resolved During this Admission:    Diagnosis Date Noted Date Resolved POA    Cardiac arrest [I46.9] 04/24/2022 04/24/2022 Unknown    Abnormal heart rhythm [I49.9] 04/24/2022 04/24/2022 Yes    Elevated LFTs [R79.89] 04/23/2022 05/02/2022 Yes       Discharged " Condition: good    Disposition: Home or Self Care    Follow Up:    Patient Instructions:      Ambulatory referral/consult to Neurology   Standing Status: Future   Referral Priority: Routine Referral Type: Consultation   Referral Reason: Specialty Services Required   Requested Specialty: Neurology   Number of Visits Requested: 1     Ambulatory referral/consult to Cardiac Electrophysiology   Standing Status: Future   Referral Priority: Routine Referral Type: Consultation   Referral Reason: Specialty Services Required   Requested Specialty: Cardiology   Number of Visits Requested: 1     Ambulatory referral/consult to Gastroenterology   Standing Status: Future   Referral Priority: Routine Referral Type: Consultation   Referral Reason: Specialty Services Required   Requested Specialty: Gastroenterology   Number of Visits Requested: 1     Ambulatory referral/consult to Electrophysiology   Standing Status: Future   Referral Priority: Routine Referral Type: Consultation   Referral Reason: Specialty Services Required   Requested Specialty: Electrophysiology   Number of Visits Requested: 1     Diet Cardiac     Notify your health care provider if you experience any of the following:  temperature >100.4     Notify your health care provider if you experience any of the following:  persistent nausea and vomiting or diarrhea     Notify your health care provider if you experience any of the following:  severe uncontrolled pain     Notify your health care provider if you experience any of the following:  difficulty breathing or increased cough     Notify your health care provider if you experience any of the following:  severe persistent headache     Notify your health care provider if you experience any of the following:  persistent dizziness, light-headedness, or visual disturbances     Activity as tolerated       Significant Diagnostic Studies: Labs: All labs within the past 24 hours have been reviewed    Pending Diagnostic  Studies:     None         Medications:  Reconciled Home Medications:      Medication List      START taking these medications    amiodarone 200 MG Tab  Commonly known as: PACERONE  Take 2 tablets (400 mg total) by mouth 2 (two) times daily for 8 days, THEN 1 tablet (200 mg total) once daily.  Start taking on: April 25, 2022     ELIQUIS 5 mg Tab  Generic drug: apixaban  Take 1 tablet (5 mg total) by mouth 2 (two) times daily.     losartan 25 MG tablet  Commonly known as: COZAAR  Take 1 tablet (25 mg total) by mouth once daily.        CONTINUE taking these medications    aspirin 81 MG Chew  Take 81 mg by mouth once daily.     MODERNA COVID-19 VACCINE (EUA) IM        STOP taking these medications    sotalol 40 MG tablet  Commonly known as: BETAPACE        ASK your doctor about these medications    amoxicillin-clavulanate 875-125mg 875-125 mg per tablet  Commonly known as: AUGMENTIN  Take 1 tablet by mouth every 12 (twelve) hours. for 10 days  Ask about: Should I take this medication?            Indwelling Lines/Drains at time of discharge:   Lines/Drains/Airways     None                 Time spent on the discharge of patient: 35 minutes         Ke Farrell MD  Department of Hospital Medicine  [facility] - Telemetry Stepdown

## 2022-05-11 ENCOUNTER — HOSPITAL ENCOUNTER (OUTPATIENT)
Dept: CARDIOLOGY | Facility: CLINIC | Age: 52
Discharge: HOME | End: 2022-05-11
Payer: COMMERCIAL

## 2022-05-11 ENCOUNTER — OFFICE VISIT (OUTPATIENT)
Dept: ELECTROPHYSIOLOGY | Facility: CLINIC | Age: 52
End: 2022-05-11
Payer: COMMERCIAL

## 2022-05-11 VITALS
HEIGHT: 64 IN | HEART RATE: 49 BPM | WEIGHT: 161.38 LBS | BODY MASS INDEX: 27.55 KG/M2 | DIASTOLIC BLOOD PRESSURE: 86 MMHG | SYSTOLIC BLOOD PRESSURE: 126 MMHG

## 2022-05-11 DIAGNOSIS — I42.8 NONISCHEMIC CARDIOMYOPATHY: ICD-10-CM

## 2022-05-11 DIAGNOSIS — I48.91 ATRIAL FIBRILLATION WITH RVR: ICD-10-CM

## 2022-05-11 DIAGNOSIS — Z86.79 HX OF MYOCARDITIS: ICD-10-CM

## 2022-05-11 DIAGNOSIS — I48.91 ATRIAL FIBRILLATION WITH RAPID VENTRICULAR RESPONSE: ICD-10-CM

## 2022-05-11 DIAGNOSIS — I49.8 OTHER SPECIFIED CARDIAC ARRHYTHMIAS: ICD-10-CM

## 2022-05-11 DIAGNOSIS — K57.92 DIVERTICULITIS: ICD-10-CM

## 2022-05-11 DIAGNOSIS — I48.0 PAROXYSMAL ATRIAL FIBRILLATION: Primary | ICD-10-CM

## 2022-05-11 DIAGNOSIS — I63.40 CEREBROVASCULAR ACCIDENT (CVA) DUE TO EMBOLISM OF CEREBRAL ARTERY: ICD-10-CM

## 2022-05-11 DIAGNOSIS — E66.3 OVERWEIGHT (BMI 25.0-29.9): ICD-10-CM

## 2022-05-11 PROCEDURE — 3079F PR MOST RECENT DIASTOLIC BLOOD PRESSURE 80-89 MM HG: ICD-10-PCS | Mod: CPTII,S$GLB,, | Performed by: STUDENT IN AN ORGANIZED HEALTH CARE EDUCATION/TRAINING PROGRAM

## 2022-05-11 PROCEDURE — 1159F MED LIST DOCD IN RCRD: CPT | Mod: CPTII,S$GLB,, | Performed by: STUDENT IN AN ORGANIZED HEALTH CARE EDUCATION/TRAINING PROGRAM

## 2022-05-11 PROCEDURE — 3008F BODY MASS INDEX DOCD: CPT | Mod: CPTII,S$GLB,, | Performed by: STUDENT IN AN ORGANIZED HEALTH CARE EDUCATION/TRAINING PROGRAM

## 2022-05-11 PROCEDURE — 3074F SYST BP LT 130 MM HG: CPT | Mod: CPTII,S$GLB,, | Performed by: STUDENT IN AN ORGANIZED HEALTH CARE EDUCATION/TRAINING PROGRAM

## 2022-05-11 PROCEDURE — 93010 ELECTROCARDIOGRAM REPORT: CPT | Mod: S$GLB,,, | Performed by: INTERNAL MEDICINE

## 2022-05-11 PROCEDURE — 1111F PR DISCHARGE MEDS RECONCILED W/ CURRENT OUTPATIENT MED LIST: ICD-10-PCS | Mod: CPTII,S$GLB,, | Performed by: STUDENT IN AN ORGANIZED HEALTH CARE EDUCATION/TRAINING PROGRAM

## 2022-05-11 PROCEDURE — 1111F DSCHRG MED/CURRENT MED MERGE: CPT | Mod: CPTII,S$GLB,, | Performed by: STUDENT IN AN ORGANIZED HEALTH CARE EDUCATION/TRAINING PROGRAM

## 2022-05-11 PROCEDURE — 4010F ACE/ARB THERAPY RXD/TAKEN: CPT | Mod: CPTII,S$GLB,, | Performed by: STUDENT IN AN ORGANIZED HEALTH CARE EDUCATION/TRAINING PROGRAM

## 2022-05-11 PROCEDURE — 3079F DIAST BP 80-89 MM HG: CPT | Mod: CPTII,S$GLB,, | Performed by: STUDENT IN AN ORGANIZED HEALTH CARE EDUCATION/TRAINING PROGRAM

## 2022-05-11 PROCEDURE — 99215 OFFICE O/P EST HI 40 MIN: CPT | Mod: S$GLB,,, | Performed by: STUDENT IN AN ORGANIZED HEALTH CARE EDUCATION/TRAINING PROGRAM

## 2022-05-11 PROCEDURE — 3074F PR MOST RECENT SYSTOLIC BLOOD PRESSURE < 130 MM HG: ICD-10-PCS | Mod: CPTII,S$GLB,, | Performed by: STUDENT IN AN ORGANIZED HEALTH CARE EDUCATION/TRAINING PROGRAM

## 2022-05-11 PROCEDURE — 4010F PR ACE/ARB THEARPY RXD/TAKEN: ICD-10-PCS | Mod: CPTII,S$GLB,, | Performed by: STUDENT IN AN ORGANIZED HEALTH CARE EDUCATION/TRAINING PROGRAM

## 2022-05-11 PROCEDURE — 93010 RHYTHM STRIP: ICD-10-PCS | Mod: S$GLB,,, | Performed by: INTERNAL MEDICINE

## 2022-05-11 PROCEDURE — 1159F PR MEDICATION LIST DOCUMENTED IN MEDICAL RECORD: ICD-10-PCS | Mod: CPTII,S$GLB,, | Performed by: STUDENT IN AN ORGANIZED HEALTH CARE EDUCATION/TRAINING PROGRAM

## 2022-05-11 PROCEDURE — 99999 PR PBB SHADOW E&M-EST. PATIENT-LVL III: CPT | Mod: PBBFAC,,, | Performed by: STUDENT IN AN ORGANIZED HEALTH CARE EDUCATION/TRAINING PROGRAM

## 2022-05-11 PROCEDURE — 3008F PR BODY MASS INDEX (BMI) DOCUMENTED: ICD-10-PCS | Mod: CPTII,S$GLB,, | Performed by: STUDENT IN AN ORGANIZED HEALTH CARE EDUCATION/TRAINING PROGRAM

## 2022-05-11 PROCEDURE — 93005 ELECTROCARDIOGRAM TRACING: CPT | Mod: S$GLB,,, | Performed by: STUDENT IN AN ORGANIZED HEALTH CARE EDUCATION/TRAINING PROGRAM

## 2022-05-11 PROCEDURE — 93005 RHYTHM STRIP: ICD-10-PCS | Mod: S$GLB,,, | Performed by: STUDENT IN AN ORGANIZED HEALTH CARE EDUCATION/TRAINING PROGRAM

## 2022-05-11 PROCEDURE — 99999 PR PBB SHADOW E&M-EST. PATIENT-LVL III: ICD-10-PCS | Mod: PBBFAC,,, | Performed by: STUDENT IN AN ORGANIZED HEALTH CARE EDUCATION/TRAINING PROGRAM

## 2022-05-11 PROCEDURE — 99215 PR OFFICE/OUTPT VISIT, EST, LEVL V, 40-54 MIN: ICD-10-PCS | Mod: S$GLB,,, | Performed by: STUDENT IN AN ORGANIZED HEALTH CARE EDUCATION/TRAINING PROGRAM

## 2022-05-11 RX ORDER — AMIODARONE HYDROCHLORIDE 200 MG/1
200 TABLET ORAL DAILY
Qty: 30 TABLET | Refills: 11 | Status: SHIPPED | OUTPATIENT
Start: 2022-05-11 | End: 2022-10-30 | Stop reason: SDUPTHER

## 2022-05-11 RX ORDER — LOSARTAN POTASSIUM 25 MG/1
25 TABLET ORAL DAILY
Qty: 90 TABLET | Refills: 3 | Status: SHIPPED | OUTPATIENT
Start: 2022-05-11 | End: 2022-05-18 | Stop reason: SDUPTHER

## 2022-05-11 NOTE — PROGRESS NOTES
Electrophysiology Clinic Note    Reason for follow-up patient visit: Ongoing evaluation and recommendations regarding atrial fibrillation.     PRESENTING HISTORY:     History of Present Illness:  Ms. 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 She was recently admitted from 4/22-25/2022 from her rehabilitation center with several alerts from her LifeVest, consistent with multiple events of atrial fibrillation with RVR. She returns to clinic today for ongoing discussion regarding plans for a pulmonary vein isolation ablation procedure.     Ms. Tamayo presents to clinic today with her . In discussion with Ms. Tamayo, she tells me that she is feeling overall quite well since her discharge, and has not had any subsequent alerts on her LifeVest nor her Apple watch. She feels that her amiodarone is doing an excellent job of controlling her atrial fibrillation and denies any symptoms. She continues to recover following her prior stroke with no residual deficits. She remains compliant with her apixaban oral anticoagulation, and remains in sinus rhythm. A resting echocardiogram obtained here revealed preserved systolic function with no RWMA. She has had no evidence of prolonged QT, nonsustained or sustained VT during her admission. Review of her venogram obtained in [place] at OSH reveals collateralization of the left subclavian vein. I called her outside hospital in an effort to obtain strips of her prior episode of WCT. Unfortunately, this event occurred in the CT scanner and no strips or ECGs were recorded. Currently, she has no indication for ICD implantation. We  have planned to obtain a cardiac MRI in order to further assess for possible VT substrate in the future. We continue to discuss undergoing catheter ablation of her atrial fibrillation.      She denies any episodes of dizziness, lightheadedness, syncope/presyncope, chest pain or chest discomfort, palpitations, nausea or vomiting, orthopnea, lower extremity edema, or PND. She reports baseline mild shortness of breath and dyspnea with exertion that she feels has remained stable for her. She can climb more than one flight of stairs prior to needing to take a break.     Review of Systems:  Review of Systems   Constitutional: Negative for activity change.   HENT: Negative for nasal congestion, nosebleeds, postnasal drip, rhinorrhea, sinus pressure/congestion, sneezing and sore throat.    Eyes: Negative for visual disturbance.   Respiratory: Positive for shortness of breath. Negative for apnea, cough, chest tightness and wheezing.    Cardiovascular: Negative for chest pain, palpitations, leg swelling and claudication.   Gastrointestinal: Negative for abdominal distention, abdominal pain, blood in stool, change in bowel habit, constipation, diarrhea, nausea, vomiting and change in bowel habit.   Genitourinary: Negative for dysuria and hematuria.   Musculoskeletal: Negative for gait problem.   Neurological: Negative for dizziness, seizures, syncope, weakness, light-headedness, headaches, disturbances in coordination and coordination difficulties.        PAST HISTORY:     Past Medical History:   Diagnosis Date    Abnormal Pap smear of cervix        Past Surgical History:   Procedure Laterality Date    AUGMENTATION OF BREAST      COSMETIC SURGERY         Family History:  Family History   Problem Relation Age of Onset    Ovarian cancer Maternal Aunt 60    Breast cancer Neg Hx     Cancer Neg Hx     Colon cancer Neg Hx        Social History:  She  reports that she has never smoked. She has never used smokeless tobacco. She  "reports previous alcohol use. She reports that she does not use drugs.      MEDICATIONS & ALLERGIES:     Review of patient's allergies indicates:  No Known Allergies    Current Outpatient Medications on File Prior to Visit   Medication Sig Dispense Refill    amiodarone (PACERONE) 200 MG Tab Take 2 tablets (400 mg total) by mouth 2 (two) times daily for 8 days, THEN 1 tablet (200 mg total) once daily. 62 tablet 0    apixaban (ELIQUIS) 5 mg Tab Take 1 tablet (5 mg total) by mouth 2 (two) times daily. 60 tablet 1    aspirin 81 MG Chew Take 81 mg by mouth once daily.      COVID-19 vacc,mRNA,Moderna,/PF (MODERNA COVID-19 VACCINE, EUA, IM)       losartan (COZAAR) 25 MG tablet Take 1 tablet (25 mg total) by mouth once daily. 90 tablet 3     No current facility-administered medications on file prior to visit.        OBJECTIVE:     Vital Signs:  /86   Pulse (!) 49   Ht 5' 4" (1.626 m)   Wt 73.2 kg (161 lb 6 oz)   BMI 27.70 kg/m²     Physical Exam:  Physical Exam  Constitutional:       General: She is not in acute distress.     Appearance: Normal appearance. She is not ill-appearing or diaphoretic.      Comments: Well-appearing woman in NAD.   HENT:      Head: Normocephalic and atraumatic.      Comments: Mask worn in clinic in the setting of COVID precautions.   Eyes:      Pupils: Pupils are equal, round, and reactive to light.   Cardiovascular:      Rate and Rhythm: Regular rhythm. Bradycardia present.      Pulses: Normal pulses.      Heart sounds: Normal heart sounds. No murmur heard.    No friction rub. No gallop.   Pulmonary:      Effort: Pulmonary effort is normal. No respiratory distress.      Breath sounds: Normal breath sounds. No wheezing, rhonchi or rales.   Chest:      Chest wall: No tenderness.   Abdominal:      General: There is no distension.      Palpations: Abdomen is soft.      Tenderness: There is no abdominal tenderness.   Musculoskeletal:         General: No swelling or tenderness.      " Cervical back: Normal range of motion.      Right lower leg: No edema.      Left lower leg: No edema.   Skin:     General: Skin is warm and dry.      Findings: No erythema, lesion or rash.   Neurological:      General: No focal deficit present.      Mental Status: She is alert and oriented to person, place, and time. Mental status is at baseline.      Motor: No weakness.      Gait: Gait normal.   Psychiatric:         Mood and Affect: Mood normal.         Behavior: Behavior normal.        Laboratory Data:  Lab Results   Component Value Date    WBC 9.76 04/25/2022    HGB 10.9 (L) 04/25/2022    HCT 34.1 (L) 04/25/2022    MCV 88 04/25/2022     04/25/2022     Lab Results   Component Value Date    GLU 79 04/25/2022     04/25/2022    K 4.4 04/25/2022     04/25/2022    CO2 26 04/25/2022    BUN 10 04/25/2022    CREATININE 0.7 04/25/2022    CALCIUM 9.5 04/25/2022    MG 2.0 04/25/2022     No results found for: INR, PROTIME    Pertinent Cardiac Data:  ECG: Sinus bradycardia with rate of 49 bpm,  ms, QRS 86 ms, QT/QTc 538/485 ms.     Resting 2D Transthoracic Echocardiogram - 4/23/2022:  · The left ventricle is normal in size with eccentric hypertrophy and normal systolic function. The estimated ejection fraction is 55%.  · Normal right ventricular size with normal right ventricular systolic function.  · Mild left atrial enlargement.  · Normal central venous pressure (3 mmHg).  · Atrial fibrillation present throughout the examination.       ASSESSMENT & PLAN:   Ms. 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 She was recently admitted from 4/22-25/2022 from  her rehabilitation center with several alerts from her LifeVest, consistent with multiple events of atrial fibrillation with RVR. She returns to clinic today for ongoing discussion regarding plans for a pulmonary vein isolation ablation procedure.     - We continue to discuss the pathophysiology of atrial fibrillation; specifically, we discussed paroxysmal atrial fibrillation and the concept that some patients may experience paroxysms of atrial fibrillation interrupting periods of sinus rhythm. We discussed that the biggest risk associated with atrial fibrillation is an increased risk of CVA.  - She remains on amiodarone 200mg po daily with excellent control of her atrial fibrillation. We will continue this agent.   - Her [id number] is 3 (prior CVA, female gender, age less than 64), portending an annual adjusted risk of CVA of 3.2%. She remains on uninterrupted apixaban therapy with no bleeding events reported.   - We continue to discuss catheter ablation with pulmonary vein isolation. This procedure was discussed in detail, in addition to potential risks, benefits, and alternative options, and Ms. Tamayo voices understanding and is in agreement. We will plan to perform her ablation in the near future.   - She does not require continued LifeVest therapy, as her systolic function is preserved with no recorded prior VT. (My suspicion was that her prior episode may have been aberrant atrial fibrillation at OSH rather than true VT - this occurred in a CT scanner with no tracings available to confirm.) We will plan to obtain a cardiac MRI following ablation for complete assessment of LGE for risk stratification for potential VT.   - Possible underlying drivers of atrial fibrillation were addressed at this appointment, including recommendations for maintenance of a healthy BMI - now a class I recommendation. Review of laboratory data revealed acceptable TSH at 2.022. She denies any loud snoring or excessive daytime  sleepiness.    - She will continue to follow with her PCP and neurology for ongoing management of her comorbid conditions.     This patient will plan to undergo ROWDY and pulmonary vein isolation catheter ablation within the next two months following three months of amiodarone therapy. All questions and concerns were addressed at this encounter.     Signing Physician:       DEZ Cervantes MD  Electrophysiology Attending

## 2022-05-13 PROBLEM — Z86.79 HX OF MYOCARDITIS: Status: ACTIVE | Noted: 2022-05-13

## 2022-05-13 PROBLEM — I47.20 VT (VENTRICULAR TACHYCARDIA): Status: RESOLVED | Noted: 2022-04-24 | Resolved: 2022-05-13

## 2022-05-13 PROBLEM — I42.8 NONISCHEMIC CARDIOMYOPATHY: Status: ACTIVE | Noted: 2022-05-13

## 2022-05-18 ENCOUNTER — PATIENT MESSAGE (OUTPATIENT)
Dept: ELECTROPHYSIOLOGY | Facility: CLINIC | Age: 52
End: 2022-05-18
Payer: COMMERCIAL

## 2022-05-18 RX ORDER — LOSARTAN POTASSIUM 25 MG/1
25 TABLET ORAL DAILY
Qty: 90 TABLET | Refills: 3 | Status: SHIPPED | OUTPATIENT
Start: 2022-05-18 | End: 2022-10-30 | Stop reason: SDUPTHER

## 2022-05-26 ENCOUNTER — OFFICE VISIT (OUTPATIENT)
Dept: NEUROLOGY | Facility: CLINIC | Age: 52
End: 2022-05-26
Payer: COMMERCIAL

## 2022-05-26 VITALS
WEIGHT: 165.44 LBS | SYSTOLIC BLOOD PRESSURE: 135 MMHG | HEART RATE: 55 BPM | DIASTOLIC BLOOD PRESSURE: 80 MMHG | HEIGHT: 64 IN | BODY MASS INDEX: 28.24 KG/M2

## 2022-05-26 DIAGNOSIS — I63.412 STROKE DUE TO EMBOLISM OF LEFT MIDDLE CEREBRAL ARTERY: ICD-10-CM

## 2022-05-26 DIAGNOSIS — Z86.73 HISTORY OF STROKE: Primary | ICD-10-CM

## 2022-05-26 PROCEDURE — 99204 OFFICE O/P NEW MOD 45 MIN: CPT | Mod: S$GLB,,, | Performed by: PSYCHIATRY & NEUROLOGY

## 2022-05-26 PROCEDURE — 99999 PR PBB SHADOW E&M-EST. PATIENT-LVL III: ICD-10-PCS | Mod: PBBFAC,,, | Performed by: STUDENT IN AN ORGANIZED HEALTH CARE EDUCATION/TRAINING PROGRAM

## 2022-05-26 PROCEDURE — 99204 PR OFFICE/OUTPT VISIT, NEW, LEVL IV, 45-59 MIN: ICD-10-PCS | Mod: S$GLB,,, | Performed by: PSYCHIATRY & NEUROLOGY

## 2022-05-26 PROCEDURE — 99999 PR PBB SHADOW E&M-EST. PATIENT-LVL III: CPT | Mod: PBBFAC,,, | Performed by: STUDENT IN AN ORGANIZED HEALTH CARE EDUCATION/TRAINING PROGRAM

## 2022-05-26 NOTE — PROGRESS NOTES
"Vascular Neurology Clinic  New Patient Clinic Visit    Patient Name: Maria Dolores Tamayo  MRN: [id number]  Date: 5/26/22  Referring Provider: Karl Self    CC: Ischemic stroke    SUBJECTIVE:      History of Present Illness: Maria Dolores Tamayo is a right-handed 52 y.o. female with a past medical history significant for atrial fibrillation with history of RVR, chronic systolic heart failure with preserved ejection fraction, and history of myocarditis who presents to clinica for follow-up for a recent ischemic stroke.      Per PMR notes, which were confirmed with the patient:  "Ms. Maria Dolores Tamayo is a 52 y.o. female with history of atrial fibrillation who presented to outside ED on April 7, 2022 while visiting in [place]. Patient was brought in at approximately 12:10 p.m. with new onset of right-sided paralysis and aphasia. Episode occurred while she was flying in her private jet when she lost ability to speak and had right-sided weakness at that time. She was brought to emergency landing at the 1st reports they came across a with EMS awaiting for her. CT head did not show any acute intracranial abnormalities. CTA head and neck with of negative study an aspects score 10/10. She was treated with tPA and admitted to the hospitalist service for evaluation of stroke with neurology consult. She was diagnosed with left MCA stroke with right-sided weakness. Repeat CT head was negative for any bleed. Her course was complicated with of V-tach arrest on April 9, 2022 for which she received multiple amps of epinephrine and required 3 different relations, and was given amiodarone bolus. She was intubated and transferred back to the ICU. She was determined to have prolonged QT it was in torsades. TTE showed ejection fraction 25%. She was treated with cardiac catheterization on April 13, 2022 with findings of severely reduced left ventricular ejection fraction of approximately 15-20% with severe global hypokinesis and nonischemic " "cardiomyopathy. She was treated with dual chamber left-sided intracardiac device implantation; however, the patient was not able to tolerate the procedure. Cardiology then discussed placement of LifeVest. Neurology recommended treatment with aspirin and Plavix but was later switched to apixaban on April 15, 2022. Course was complicated by respiratory cultures positive for Moraxella catarrhalis for which she was treated with 2 weeks of Unasyn.  Patient was deemed medically stable and transferred to [facility] to participate in acute inpatient rehabilitation on 4/19/2022."      Since she was discharged from the hospital, she has completed inpatient rehabilitation with significant improvement in her right hemiparesis and speech. She denies new, worsening, or recurrent symptoms since discharge. She walked unassisted with no recent falls. She has driven locally without problem. She continues to take eliquis 5mg BID, no missed doses. She was initially on a statin at the OSH, but this was discontinued due to rising transaminases. She is working with cardiology with consideration of a possible cardiac ablation. Her ejection fraction has improved since her admission and she no longer wears a LifeVest. She is working on obtaining speech therapy close to her home in [place].       Etiology: Left MCA territory infarction, s/p tPA, no LVO amenable for EVT, etiology cardioembolic 2/2 atrial fibrillation with RVR, nonischemic cardiomyopathy with reduced EF 15-20%  Intervention: IV tPA, no LVO per records amenable for EVT    Work-up:  Imaging: No intracranial imaging available for review at this time. Patient will obtain imaging on discs from the OSH in [place] and present them to clinic for review.     Cardiac Evaluation:  - TTE (4/22/2022): The left ventricle is normal in size with eccentric hypertrophy and normal systolic function. The estimated ejection fraction is 55%. Normal right ventricular size with " normal right ventricular systolic function. Mild left atrial enlargement. Normal central venous pressure (3 mmHg). Atrial fibrillation present throughout the examination.    Labs:  No results found for: LABA1C, HGBA1C    No results found for: LDLCALC      Review of Systems: The following systems were reviewed with pertinent positives and negatives documented in the HPI: constitutional, eyes, CV, respiratory, GI, , musculoskeletal, skin, neurological, psychiatric    Past Medical History:  Past Medical History:   Diagnosis Date    Abnormal Pap smear of cervix        Medications:    Current Outpatient Medications:     amiodarone (PACERONE) 200 MG Tab, Take 1 tablet (200 mg total) by mouth once daily., Disp: 30 tablet, Rfl: 11    apixaban (ELIQUIS) 5 mg Tab, Take 1 tablet (5 mg total) by mouth 2 (two) times daily., Disp: 60 tablet, Rfl: 1    losartan (COZAAR) 25 MG tablet, Take 1 tablet (25 mg total) by mouth once daily., Disp: 90 tablet, Rfl: 3    COVID-19 vacc,mRNA,Moderna,/PF (MODERNA COVID-19 VACCINE, EUA, IM), , Disp: , Rfl:   Any other notable medications as documented in HPI.    Allergies:  Review of patient's allergies indicates:  No Known Allergies    Social History:  Social History     Socioeconomic History    Marital status:    Tobacco Use    Smoking status: Never Smoker    Smokeless tobacco: Never Used   Substance and Sexual Activity    Alcohol use: Not Currently     Comment: rare    Drug use: No    Sexual activity: Yes     Partners: Male     Birth control/protection: None     Any other notable Social History as documented in HPI.    Family History:  Family History   Problem Relation Age of Onset    Ovarian cancer Maternal Aunt 60    Breast cancer Neg Hx     Cancer Neg Hx     Colon cancer Neg Hx      Any other notable FMH as documented in HPI.      OBJECTIVE:     Physical Exam:   Vitals:    05/26/22 1603   BP: 135/80   Pulse: (!) 55       GEN: NAD, pleasant, cooperative  CVS:  RRR  CHEST: No signs of resp distress, on room air    NEURO:  Mental status: The patient is alert, attentive, and oriented.  Speech: Mildly dysfluent speech with phonemic and paraphasic errors. Occasional errors in naming and repetition.     Cranial nerves:  CN II: Visual fields are full to confrontation. Pupils are 3mm and reactive to light.  CN III, IV, VI: EOMI, no nystagmus, no ptosis  CN V: Facial sensation is intact in all 3 divisions bilaterally.  CN VII: Face is symmetric with normal eye closure and smile.  CN VIII: Hearing is normal bilaterally  CN IX, X: Palate elevates symmetrically. Phonation is normal.  CN XI: Head turning and shoulder shrug are intact  CN XII: Tongue is midline with normal movements and no atrophy.    Motor: Muscle bulk and tone are normal. No pronator drift.  -- RUE: 5-/5 shoulder abduction, otherwise 5/5  -- RLE: 4+/5 hip flexion, otherwise 5/5  -- LUE: 5/5 throughout  -- LLE: 5/5 thoughout    Reflexes: Reflexes are 2+ and symmetric at the biceps, knees.    Sensory: Light touch, temperature sense are intact in bilateral upper and lower extremities.     Coordination: There is no dysmetria on finger-to-nose and heel-knee-shin. There are no abnormal or extraneous movements.    Gait/Stance: Posture is normal. Gait is steady with normal steps, base, arm swing, and turning. Tandem gait is normal.      ASSESSMENT/PLAN:     Diagnosis/Etiology: Left MCA territory infarction, s/p IV tPA at OSH, no LVO amenable for EVT; etiology likely cardioembolic given atrial fibrillation with rapid ventricular rate at the time of onset  Stroke Risk Factors: Atrial fibrillation with RVR, chronic systolic heart failure with preserved EF, remote history of viral myocarditis with reduced EF  Effects of Stroke: Subtle right hemiparesis, dysfluent speech frequent with phonemic and paraphasic errors    Recommendations:   - Additional studies: No additional studies from a stroke perspective are indicated at this  time. She will continue to pursue speech therapy options, but fortunately, she has improved significantly since her stroke.    -- In terms of undergoing cardiac procedures, the general consensus is to wait at least 3 months prior to undergoing cardiac ablation for atrial fibrillation, though recent retrospective studies suggest that ablation procedure is safe even within 3 months after a mild or moderate stroke (https://doi.org/[email or web address]/[email or web address]). After discussion with the patient, it appears that the timing of her surgery (which may be delayed until after her 's surgery) will be around the 3-4 month timeframe.   - Antiplatelet/Anticoagulation: Continue eliquis 5 mg twice a day for secondary stroke prevention due to atrial fibrillation.   - Lipid Management: Target is LDL < 70mg/dL. Recommend close follow-up with her PCP for monitoring and medication management as indicated.   - Diabetes: Target hemoglobin A1c <7%, measured 2X/year or quarterly if not meeting goals. No history of diabetes. No recent hemoglobin A1c recorded in our EHR. Recommend routine monitoring per PCP.   - Hypertension: Target systolic BP <130mmHg. Continue home blood pressure medications.  - Therapy: Recommend aggressive speech therapy, which she will pursue with resources close to her home.  - RTC as needed      Problem List Items Addressed This Visit    None     Visit Diagnoses     History of stroke    -  Primary    Stroke due to embolism of left middle cerebral artery                Cinda Miguel MD, PhD  Vascular Neurology Fellow, PGY-5

## 2022-05-26 NOTE — PATIENT INSTRUCTIONS
- Continue eliquis 5mg twice a day for secondary stroke prevention secondary to atrial fibrillation  - Recommend close follow-up with Cardiology for atrial fibrillation (consideration of ablation)  - No driving restrictions from a Vascular Neurology perspective  -- Recommend routine eye examination if not completed recently

## 2022-07-18 ENCOUNTER — HOSPITAL ENCOUNTER (OUTPATIENT)
Dept: CARDIOLOGY | Facility: CLINIC | Age: 52
Discharge: HOME | End: 2022-07-18
Payer: COMMERCIAL

## 2022-07-18 ENCOUNTER — OFFICE VISIT (OUTPATIENT)
Dept: ELECTROPHYSIOLOGY | Facility: CLINIC | Age: 52
End: 2022-07-18
Payer: COMMERCIAL

## 2022-07-18 VITALS
DIASTOLIC BLOOD PRESSURE: 57 MMHG | SYSTOLIC BLOOD PRESSURE: 121 MMHG | BODY MASS INDEX: 29.1 KG/M2 | HEIGHT: 64 IN | HEART RATE: 52 BPM | WEIGHT: 170.44 LBS

## 2022-07-18 DIAGNOSIS — I48.91 ATRIAL FIBRILLATION WITH RAPID VENTRICULAR RESPONSE: ICD-10-CM

## 2022-07-18 DIAGNOSIS — D25.9 UTERINE LEIOMYOMA, UNSPECIFIED LOCATION: ICD-10-CM

## 2022-07-18 DIAGNOSIS — I48.91 ATRIAL FIBRILLATION WITH RVR: Primary | ICD-10-CM

## 2022-07-18 DIAGNOSIS — I49.8 OTHER SPECIFIED CARDIAC ARRHYTHMIAS: ICD-10-CM

## 2022-07-18 DIAGNOSIS — I63.40 CEREBROVASCULAR ACCIDENT (CVA) DUE TO EMBOLISM OF CEREBRAL ARTERY: ICD-10-CM

## 2022-07-18 DIAGNOSIS — I48.0 PAROXYSMAL ATRIAL FIBRILLATION: ICD-10-CM

## 2022-07-18 DIAGNOSIS — E66.3 OVERWEIGHT (BMI 25.0-29.9): ICD-10-CM

## 2022-07-18 DIAGNOSIS — I50.42 CHRONIC COMBINED SYSTOLIC AND DIASTOLIC HEART FAILURE: ICD-10-CM

## 2022-07-18 DIAGNOSIS — K57.92 DIVERTICULITIS: ICD-10-CM

## 2022-07-18 DIAGNOSIS — I42.8 NONISCHEMIC CARDIOMYOPATHY: ICD-10-CM

## 2022-07-18 DIAGNOSIS — Z86.79 HX OF MYOCARDITIS: ICD-10-CM

## 2022-07-18 DIAGNOSIS — I49.9 VENTRICULAR ARRHYTHMIA: ICD-10-CM

## 2022-07-18 PROCEDURE — 3078F DIAST BP <80 MM HG: CPT | Mod: CPTII,S$GLB,, | Performed by: STUDENT IN AN ORGANIZED HEALTH CARE EDUCATION/TRAINING PROGRAM

## 2022-07-18 PROCEDURE — 4010F ACE/ARB THERAPY RXD/TAKEN: CPT | Mod: CPTII,S$GLB,, | Performed by: STUDENT IN AN ORGANIZED HEALTH CARE EDUCATION/TRAINING PROGRAM

## 2022-07-18 PROCEDURE — 3008F BODY MASS INDEX DOCD: CPT | Mod: CPTII,S$GLB,, | Performed by: STUDENT IN AN ORGANIZED HEALTH CARE EDUCATION/TRAINING PROGRAM

## 2022-07-18 PROCEDURE — 1159F PR MEDICATION LIST DOCUMENTED IN MEDICAL RECORD: ICD-10-PCS | Mod: CPTII,S$GLB,, | Performed by: STUDENT IN AN ORGANIZED HEALTH CARE EDUCATION/TRAINING PROGRAM

## 2022-07-18 PROCEDURE — 99215 OFFICE O/P EST HI 40 MIN: CPT | Mod: S$GLB,,, | Performed by: STUDENT IN AN ORGANIZED HEALTH CARE EDUCATION/TRAINING PROGRAM

## 2022-07-18 PROCEDURE — 3074F PR MOST RECENT SYSTOLIC BLOOD PRESSURE < 130 MM HG: ICD-10-PCS | Mod: CPTII,S$GLB,, | Performed by: STUDENT IN AN ORGANIZED HEALTH CARE EDUCATION/TRAINING PROGRAM

## 2022-07-18 PROCEDURE — 99215 PR OFFICE/OUTPT VISIT, EST, LEVL V, 40-54 MIN: ICD-10-PCS | Mod: S$GLB,,, | Performed by: STUDENT IN AN ORGANIZED HEALTH CARE EDUCATION/TRAINING PROGRAM

## 2022-07-18 PROCEDURE — 93010 ELECTROCARDIOGRAM REPORT: CPT | Mod: S$GLB,,, | Performed by: INTERNAL MEDICINE

## 2022-07-18 PROCEDURE — 93005 ELECTROCARDIOGRAM TRACING: CPT | Mod: S$GLB,,, | Performed by: STUDENT IN AN ORGANIZED HEALTH CARE EDUCATION/TRAINING PROGRAM

## 2022-07-18 PROCEDURE — 1159F MED LIST DOCD IN RCRD: CPT | Mod: CPTII,S$GLB,, | Performed by: STUDENT IN AN ORGANIZED HEALTH CARE EDUCATION/TRAINING PROGRAM

## 2022-07-18 PROCEDURE — 99999 PR PBB SHADOW E&M-EST. PATIENT-LVL III: ICD-10-PCS | Mod: PBBFAC,,, | Performed by: STUDENT IN AN ORGANIZED HEALTH CARE EDUCATION/TRAINING PROGRAM

## 2022-07-18 PROCEDURE — 3008F PR BODY MASS INDEX (BMI) DOCUMENTED: ICD-10-PCS | Mod: CPTII,S$GLB,, | Performed by: STUDENT IN AN ORGANIZED HEALTH CARE EDUCATION/TRAINING PROGRAM

## 2022-07-18 PROCEDURE — 99999 PR PBB SHADOW E&M-EST. PATIENT-LVL III: CPT | Mod: PBBFAC,,, | Performed by: STUDENT IN AN ORGANIZED HEALTH CARE EDUCATION/TRAINING PROGRAM

## 2022-07-18 PROCEDURE — 3074F SYST BP LT 130 MM HG: CPT | Mod: CPTII,S$GLB,, | Performed by: STUDENT IN AN ORGANIZED HEALTH CARE EDUCATION/TRAINING PROGRAM

## 2022-07-18 PROCEDURE — 3078F PR MOST RECENT DIASTOLIC BLOOD PRESSURE < 80 MM HG: ICD-10-PCS | Mod: CPTII,S$GLB,, | Performed by: STUDENT IN AN ORGANIZED HEALTH CARE EDUCATION/TRAINING PROGRAM

## 2022-07-18 PROCEDURE — 93010 RHYTHM STRIP: ICD-10-PCS | Mod: S$GLB,,, | Performed by: INTERNAL MEDICINE

## 2022-07-18 PROCEDURE — 4010F PR ACE/ARB THEARPY RXD/TAKEN: ICD-10-PCS | Mod: CPTII,S$GLB,, | Performed by: STUDENT IN AN ORGANIZED HEALTH CARE EDUCATION/TRAINING PROGRAM

## 2022-07-18 PROCEDURE — 93005 RHYTHM STRIP: ICD-10-PCS | Mod: S$GLB,,, | Performed by: STUDENT IN AN ORGANIZED HEALTH CARE EDUCATION/TRAINING PROGRAM

## 2022-07-18 NOTE — PROGRESS NOTES
Electrophysiology Clinic Note    Reason for follow-up patient visit: Ongoing evaluation and recommendations regarding paroxysmal atrial fibrillation.     PRESENTING HISTORY:     History of Present Illness:  Ms. 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 She was previously admitted from 4/22-25/2022 from her rehabilitation center with several alerts from her LifeVest, consistent with multiple events of atrial fibrillation with RVR. She returns to clinic today for ongoing discussion regarding plans for a pulmonary vein isolation ablation procedure.     Ms. Tamayo presents to clinic today with her . In discussion with Ms. Tamayo, she tells me that she is feeling overall quite well since her discharge. She has since had recovery of her systolic function, with LVEF 55%, and no longer requires a LifeVest. She denies any further episodes of atrial fibrillation on her Apple watch, and denies any symptoms similar to her previous palpitations with her prior episodes of atrial fibrillation. She remains on amiodarone 200mg po daily in addition to apixaban 5mg po BID. She denies any adverse bleeding events while maintained on oral anticoagulation. She continues to recover following her prior stroke with no residual deficits. She is planning to start hyperbaric oxygen therapy for speech recovery per her neurologist. She remains in sinus rhythm on all subsequent ECGs, with no evidence of prolonged QT, nonsustained or sustained VT. Review of her venogram obtained in [place] at OSH reveals collateralization of the left subclavian vein. I called her outside hospital in an  effort to obtain strips of her prior episode of WCT. Unfortunately, this event occurred in the CT scanner and no strips or ECGs were recorded. Currently, she has no indication for ICD implantation. We have planned to obtain a cardiac MRI in order to further assess for possible VT substrate in the future. We continue to discuss undergoing catheter ablation of her atrial fibrillation.      She denies any episodes of dizziness, lightheadedness, syncope/presyncope, chest pain or chest discomfort, palpitations, nausea or vomiting, orthopnea, lower extremity edema, or PND. She reports baseline mild shortness of breath and dyspnea with exertion that she feels has remained stable for her. She can climb more than one flight of stairs prior to needing to take a break.     Review of Systems:  Review of Systems   Constitutional: Negative for activity change.   HENT: Negative for nasal congestion, nosebleeds, postnasal drip, rhinorrhea, sinus pressure/congestion, sneezing and sore throat.    Eyes: Negative for visual disturbance.   Respiratory: Positive for shortness of breath. Negative for apnea, cough, chest tightness and wheezing.    Cardiovascular: Negative for chest pain, palpitations, leg swelling and claudication.   Gastrointestinal: Negative for abdominal distention, abdominal pain, blood in stool, change in bowel habit, constipation, diarrhea, nausea, vomiting and change in bowel habit.   Genitourinary: Negative for dysuria and hematuria.   Musculoskeletal: Negative for gait problem.   Neurological: Negative for dizziness, seizures, syncope, weakness, light-headedness, headaches, disturbances in coordination and coordination difficulties.        PAST HISTORY:     Past Medical History:   Diagnosis Date    Abnormal Pap smear of cervix        Past Surgical History:   Procedure Laterality Date    AUGMENTATION OF BREAST      COSMETIC SURGERY         Family History:  Family History   Problem Relation Age of Onset     "Ovarian cancer Maternal Aunt 60    Breast cancer Neg Hx     Cancer Neg Hx     Colon cancer Neg Hx        Social History:  She  reports that she has never smoked. She has never used smokeless tobacco. She reports previous alcohol use. She reports that she does not use drugs.      MEDICATIONS & ALLERGIES:     Review of patient's allergies indicates:  No Known Allergies    Current Outpatient Medications on File Prior to Visit   Medication Sig Dispense Refill    amiodarone (PACERONE) 200 MG Tab Take 1 tablet (200 mg total) by mouth once daily. 30 tablet 11    COVID-19 vacc,mRNA,Moderna,/PF (MODERNA COVID-19 VACCINE, EUA, IM)       ELIQUIS 5 mg Tab TAKE 1 TABLET BY MOUTH TWICE A DAY 60 tablet 1    losartan (COZAAR) 25 MG tablet Take 1 tablet (25 mg total) by mouth once daily. 90 tablet 3     No current facility-administered medications on file prior to visit.        OBJECTIVE:     Vital Signs:  BP (!) 121/57   Pulse (!) 52   Ht 5' 4" (1.626 m)   Wt 77.3 kg (170 lb 6.7 oz)   LMP 07/04/2022 (Approximate)   BMI 29.25 kg/m²     Physical Exam:  Physical Exam  Constitutional:       General: She is not in acute distress.     Appearance: Normal appearance. She is not ill-appearing or diaphoretic.      Comments: Well-appearing woman in NAD.   HENT:      Head: Normocephalic and atraumatic.      Comments: Mask worn in clinic in the setting of COVID precautions.   Eyes:      Pupils: Pupils are equal, round, and reactive to light.   Cardiovascular:      Rate and Rhythm: Regular rhythm. Bradycardia present.      Pulses: Normal pulses.      Heart sounds: Normal heart sounds. No murmur heard.    No friction rub. No gallop.   Pulmonary:      Effort: Pulmonary effort is normal. No respiratory distress.      Breath sounds: Normal breath sounds. No wheezing, rhonchi or rales.   Chest:      Chest wall: No tenderness.   Abdominal:      General: There is no distension.      Palpations: Abdomen is soft.      Tenderness: There is no " abdominal tenderness.   Musculoskeletal:         General: No swelling or tenderness.      Cervical back: Normal range of motion.      Right lower leg: No edema.      Left lower leg: No edema.   Skin:     General: Skin is warm and dry.      Findings: No erythema, lesion or rash.   Neurological:      General: No focal deficit present.      Mental Status: She is alert and oriented to person, place, and time. Mental status is at baseline.      Motor: No weakness.      Gait: Gait normal.   Psychiatric:         Mood and Affect: Mood normal.         Behavior: Behavior normal.        Laboratory Data:  Lab Results   Component Value Date    WBC 9.76 04/25/2022    HGB 10.9 (L) 04/25/2022    HCT 34.1 (L) 04/25/2022    MCV 88 04/25/2022     04/25/2022     Lab Results   Component Value Date    GLU 79 04/25/2022     04/25/2022    K 4.4 04/25/2022     04/25/2022    CO2 26 04/25/2022    BUN 10 04/25/2022    CREATININE 0.7 04/25/2022    CALCIUM 9.5 04/25/2022    MG 2.0 04/25/2022     No results found for: INR, PROTIME    Pertinent Cardiac Data:  ECG: Sinus bradycardia with rate of 52 bpm,  ms, QRS 88 ms, QT/QTc 476/442 ms.     Resting 2D Transthoracic Echocardiogram - 4/23/2022:  · The left ventricle is normal in size with eccentric hypertrophy and normal systolic function. The estimated ejection fraction is 55%.  · Normal right ventricular size with normal right ventricular systolic function.  · Mild left atrial enlargement.  · Normal central venous pressure (3 mmHg).  · Atrial fibrillation present throughout the examination.       ASSESSMENT & PLAN:   Ms. 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 She was previously admitted from 4/22-25/2022 from her rehabilitation center with several alerts from her LifeVest, consistent with multiple events of atrial fibrillation with RVR. She returns to clinic today for ongoing discussion regarding plans for a pulmonary vein isolation ablation procedure.     - We continue to discuss the pathophysiology of atrial fibrillation; specifically, we discussed paroxysmal atrial fibrillation and the concept that some patients may experience paroxysms of atrial fibrillation interrupting periods of sinus rhythm. We discussed that the biggest risk associated with atrial fibrillation is an increased risk of CVA.  - She remains on amiodarone 200mg po daily with excellent control of her atrial fibrillation. We will continue this agent.   - Her [id number] is 3 (prior CVA, female gender, age less than 64), portending an annual adjusted risk of CVA of 3.2%. She remains on uninterrupted apixaban therapy with no bleeding events reported.   - We continue to discuss catheter ablation with pulmonary vein isolation. This procedure was discussed in detail, in addition to potential risks, benefits, and alternative options, and Ms. Tamayo voices understanding and is in agreement. Informed consent was obtained today. She would ideally like to defer this procedure until after her  can undergo treatments for his liver - ideally, this ablation will be performed later this Summer.   - She has recovery of her systolic function with no recorded prior VT. (My suspicion was that her prior episode may have been aberrant atrial fibrillation at OSH rather than true VT - this occurred in a CT scanner with no tracings available to confirm.) We may consider obtaining a cardiac MRI following ablation for complete assessment of LGE for risk stratification for potential VT.   - Possible underlying drivers of atrial fibrillation were addressed at this appointment,  including recommendations for maintenance of a healthy BMI - now a class I recommendation. Review of laboratory data revealed acceptable TSH at 2.022. She denies any loud snoring or excessive daytime sleepiness.    - She will continue to follow with her PCP and neurology for ongoing management of her comorbid conditions.     This patient will present to the EP laboratory to undergo a ROWDY/radiofrequency pulmonary vein isolation ablation at her next availability. She will remain on amiodarone antiarrhythmic and apixaban oral anticoagulation therapy. All questions and concerns were addressed at this encounter.     Signing Physician:       DEZ Cervantes MD  Electrophysiology Attending

## 2022-07-18 NOTE — Clinical Note
Kristian Kngiht,  This patient will need a ROWDY/PVI. Her  is undergoing liver surgery, and she wanted to wait until this could be performed and he could start recovery before she scheduled her ablation. Ideally this would happen later this summer.   Thank you!

## 2022-07-28 ENCOUNTER — PATIENT MESSAGE (OUTPATIENT)
Dept: OBSTETRICS AND GYNECOLOGY | Facility: CLINIC | Age: 52
End: 2022-07-28
Payer: COMMERCIAL

## 2022-07-29 ENCOUNTER — PROCEDURE VISIT (OUTPATIENT)
Dept: OBSTETRICS AND GYNECOLOGY | Facility: CLINIC | Age: 52
End: 2022-07-29
Attending: OBSTETRICS & GYNECOLOGY
Payer: COMMERCIAL

## 2022-07-29 ENCOUNTER — HOSPITAL ENCOUNTER (OUTPATIENT)
Facility: OTHER | Age: 52
Discharge: HOME | End: 2022-07-30
Attending: EMERGENCY MEDICINE | Admitting: HOSPITALIST
Payer: COMMERCIAL

## 2022-07-29 ENCOUNTER — LAB VISIT (OUTPATIENT)
Dept: LAB | Facility: OTHER | Age: 52
End: 2022-07-29
Attending: OBSTETRICS & GYNECOLOGY
Payer: COMMERCIAL

## 2022-07-29 ENCOUNTER — TELEPHONE (OUTPATIENT)
Dept: CARDIOLOGY | Facility: CLINIC | Age: 52
End: 2022-07-29
Payer: COMMERCIAL

## 2022-07-29 VITALS
HEIGHT: 64 IN | WEIGHT: 172.81 LBS | SYSTOLIC BLOOD PRESSURE: 118 MMHG | BODY MASS INDEX: 29.5 KG/M2 | DIASTOLIC BLOOD PRESSURE: 47 MMHG

## 2022-07-29 DIAGNOSIS — Z79.01 ON APIXABAN THERAPY: ICD-10-CM

## 2022-07-29 DIAGNOSIS — I63.119 CEREBROVASCULAR ACCIDENT (CVA) DUE TO EMBOLISM OF VERTEBRAL ARTERY, UNSPECIFIED BLOOD VESSEL LATERALITY: ICD-10-CM

## 2022-07-29 DIAGNOSIS — R53.83 OTHER FATIGUE: ICD-10-CM

## 2022-07-29 DIAGNOSIS — R60.0 PERIPHERAL EDEMA: ICD-10-CM

## 2022-07-29 DIAGNOSIS — I48.91 ATRIAL FIBRILLATION WITH RVR: ICD-10-CM

## 2022-07-29 DIAGNOSIS — N92.1 MENORRHAGIA WITH IRREGULAR CYCLE: ICD-10-CM

## 2022-07-29 DIAGNOSIS — I48.91 ATRIAL FIBRILLATION WITH RVR: Primary | ICD-10-CM

## 2022-07-29 DIAGNOSIS — D64.9 SYMPTOMATIC ANEMIA: Primary | ICD-10-CM

## 2022-07-29 DIAGNOSIS — D25.9 UTERINE LEIOMYOMA, UNSPECIFIED LOCATION: ICD-10-CM

## 2022-07-29 DIAGNOSIS — R53.83 OTHER FATIGUE: Primary | ICD-10-CM

## 2022-07-29 LAB
ABO + RH BLD: NORMAL
ALBUMIN SERPL BCP-MCNC: 3.6 G/DL (ref 3.5–5.2)
ALP SERPL-CCNC: 60 U/L (ref 55–135)
ALT SERPL W/O P-5'-P-CCNC: 17 U/L (ref 10–44)
ANION GAP SERPL CALC-SCNC: 12 MMOL/L (ref 8–16)
AST SERPL-CCNC: 15 U/L (ref 10–40)
BASOPHILS # BLD AUTO: 0.03 K/UL (ref 0–0.2)
BASOPHILS NFR BLD: 0.4 % (ref 0–1.9)
BILIRUB SERPL-MCNC: 0.3 MG/DL (ref 0.1–1)
BLD GP AB SCN CELLS X3 SERPL QL: NORMAL
BLD PROD TYP BPU: NORMAL
BLD PROD TYP BPU: NORMAL
BLOOD UNIT EXPIRATION DATE: NORMAL
BLOOD UNIT EXPIRATION DATE: NORMAL
BLOOD UNIT TYPE CODE: 5100
BLOOD UNIT TYPE CODE: 5100
BLOOD UNIT TYPE: NORMAL
BLOOD UNIT TYPE: NORMAL
BUN SERPL-MCNC: 11 MG/DL (ref 6–20)
CALCIUM SERPL-MCNC: 8.9 MG/DL (ref 8.7–10.5)
CHLORIDE SERPL-SCNC: 107 MMOL/L (ref 95–110)
CO2 SERPL-SCNC: 22 MMOL/L (ref 23–29)
CODING SYSTEM: NORMAL
CODING SYSTEM: NORMAL
CREAT SERPL-MCNC: 0.9 MG/DL (ref 0.5–1.4)
CTP QC/QA: YES
DIFFERENTIAL METHOD: ABNORMAL
DISPENSE STATUS: NORMAL
DISPENSE STATUS: NORMAL
EOSINOPHIL # BLD AUTO: 0.1 K/UL (ref 0–0.5)
EOSINOPHIL NFR BLD: 1.2 % (ref 0–8)
ERYTHROCYTE [DISTWIDTH] IN BLOOD BY AUTOMATED COUNT: 13.3 % (ref 11.5–14.5)
EST. GFR  (AFRICAN AMERICAN): >60 ML/MIN/1.73 M^2
EST. GFR  (NON AFRICAN AMERICAN): >60 ML/MIN/1.73 M^2
ESTRADIOL SERPL-MCNC: 126 PG/ML
FERRITIN SERPL-MCNC: 10 NG/ML (ref 20–300)
FSH SERPL-ACNC: 14.75 MIU/ML
GLUCOSE SERPL-MCNC: 111 MG/DL (ref 70–110)
HCT VFR BLD AUTO: 19.4 % (ref 37–48.5)
HGB BLD-MCNC: 6.3 G/DL (ref 12–16)
IMM GRANULOCYTES # BLD AUTO: 0.07 K/UL (ref 0–0.04)
IMM GRANULOCYTES NFR BLD AUTO: 0.8 % (ref 0–0.5)
IRON SERPL-MCNC: 18 UG/DL (ref 30–160)
LYMPHOCYTES # BLD AUTO: 1.3 K/UL (ref 1–4.8)
LYMPHOCYTES NFR BLD: 15.5 % (ref 18–48)
MCH RBC QN AUTO: 29.9 PG (ref 27–31)
MCHC RBC AUTO-ENTMCNC: 32.5 G/DL (ref 32–36)
MCV RBC AUTO: 92 FL (ref 82–98)
MONOCYTES # BLD AUTO: 0.6 K/UL (ref 0.3–1)
MONOCYTES NFR BLD: 7.7 % (ref 4–15)
NEUTROPHILS # BLD AUTO: 6.2 K/UL (ref 1.8–7.7)
NEUTROPHILS NFR BLD: 74.4 % (ref 38–73)
NRBC BLD-RTO: 0 /100 WBC
NUM UNITS TRANS PACKED RBC: NORMAL
NUM UNITS TRANS PACKED RBC: NORMAL
PLATELET # BLD AUTO: 358 K/UL (ref 150–450)
PMV BLD AUTO: 10.1 FL (ref 9.2–12.9)
POTASSIUM SERPL-SCNC: 4.3 MMOL/L (ref 3.5–5.1)
PROT SERPL-MCNC: 6.6 G/DL (ref 6–8.4)
RBC # BLD AUTO: 2.11 M/UL (ref 4–5.4)
SARS-COV-2 RDRP RESP QL NAA+PROBE: NEGATIVE
SATURATED IRON: 3 % (ref 20–50)
SODIUM SERPL-SCNC: 141 MMOL/L (ref 136–145)
TOTAL IRON BINDING CAPACITY: 579 UG/DL (ref 250–450)
TRANSFERRIN SERPL-MCNC: 391 MG/DL (ref 200–375)
TSH SERPL DL<=0.005 MIU/L-ACNC: 2.19 UIU/ML (ref 0.4–4)
WBC # BLD AUTO: 8.28 K/UL (ref 3.9–12.7)

## 2022-07-29 PROCEDURE — 85025 COMPLETE CBC W/AUTO DIFF WBC: CPT | Performed by: OBSTETRICS & GYNECOLOGY

## 2022-07-29 PROCEDURE — 36430 TRANSFUSION BLD/BLD COMPNT: CPT

## 2022-07-29 PROCEDURE — 99285 EMERGENCY DEPT VISIT HI MDM: CPT | Mod: 25

## 2022-07-29 PROCEDURE — 83001 ASSAY OF GONADOTROPIN (FSH): CPT | Performed by: OBSTETRICS & GYNECOLOGY

## 2022-07-29 PROCEDURE — 80053 COMPREHEN METABOLIC PANEL: CPT | Performed by: OBSTETRICS & GYNECOLOGY

## 2022-07-29 PROCEDURE — 88305 TISSUE EXAM BY PATHOLOGIST: CPT | Mod: 26,,, | Performed by: PATHOLOGY

## 2022-07-29 PROCEDURE — 88305 TISSUE EXAM BY PATHOLOGIST: ICD-10-PCS | Mod: 26,,, | Performed by: PATHOLOGY

## 2022-07-29 PROCEDURE — 99214 OFFICE O/P EST MOD 30 MIN: CPT | Mod: 25,S$GLB,, | Performed by: OBSTETRICS & GYNECOLOGY

## 2022-07-29 PROCEDURE — 88305 TISSUE EXAM BY PATHOLOGIST: CPT | Performed by: PATHOLOGY

## 2022-07-29 PROCEDURE — 86901 BLOOD TYPING SEROLOGIC RH(D): CPT | Performed by: EMERGENCY MEDICINE

## 2022-07-29 PROCEDURE — P9016 RBC LEUKOCYTES REDUCED: HCPCS | Performed by: EMERGENCY MEDICINE

## 2022-07-29 PROCEDURE — 84443 ASSAY THYROID STIM HORMONE: CPT | Performed by: OBSTETRICS & GYNECOLOGY

## 2022-07-29 PROCEDURE — 82670 ASSAY OF TOTAL ESTRADIOL: CPT | Performed by: OBSTETRICS & GYNECOLOGY

## 2022-07-29 PROCEDURE — 86920 COMPATIBILITY TEST SPIN: CPT | Performed by: EMERGENCY MEDICINE

## 2022-07-29 PROCEDURE — 58100 BIOPSY OF UTERUS LINING: CPT | Mod: S$GLB,,, | Performed by: OBSTETRICS & GYNECOLOGY

## 2022-07-29 PROCEDURE — G0378 HOSPITAL OBSERVATION PER HR: HCPCS

## 2022-07-29 PROCEDURE — 58100 PR BIOPSY OF UTERUS LINING: ICD-10-PCS | Mod: S$GLB,,, | Performed by: OBSTETRICS & GYNECOLOGY

## 2022-07-29 PROCEDURE — 82728 ASSAY OF FERRITIN: CPT | Performed by: OBSTETRICS & GYNECOLOGY

## 2022-07-29 PROCEDURE — 99214 PR OFFICE/OUTPT VISIT, EST, LEVL IV, 30-39 MIN: ICD-10-PCS | Mod: 25,S$GLB,, | Performed by: OBSTETRICS & GYNECOLOGY

## 2022-07-29 PROCEDURE — 84466 ASSAY OF TRANSFERRIN: CPT | Performed by: OBSTETRICS & GYNECOLOGY

## 2022-07-29 PROCEDURE — 96374 THER/PROPH/DIAG INJ IV PUSH: CPT

## 2022-07-29 PROCEDURE — 63600175 PHARM REV CODE 636 W HCPCS: Performed by: EMERGENCY MEDICINE

## 2022-07-29 PROCEDURE — U0002 COVID-19 LAB TEST NON-CDC: HCPCS | Performed by: EMERGENCY MEDICINE

## 2022-07-29 RX ORDER — SODIUM CHLORIDE 0.9 % (FLUSH) 0.9 %
10 SYRINGE (ML) INJECTION EVERY 8 HOURS PRN
Status: DISCONTINUED | OUTPATIENT
Start: 2022-07-29 | End: 2022-07-30 | Stop reason: HOSPADM

## 2022-07-29 RX ORDER — FUROSEMIDE 10 MG/ML
40 INJECTION INTRAMUSCULAR; INTRAVENOUS
Status: COMPLETED | OUTPATIENT
Start: 2022-07-29 | End: 2022-07-29

## 2022-07-29 RX ORDER — SODIUM CHLORIDE 0.9 % (FLUSH) 0.9 %
10 SYRINGE (ML) INJECTION
Status: CANCELLED | OUTPATIENT
Start: 2022-07-29

## 2022-07-29 RX ORDER — MEDROXYPROGESTERONE ACETATE 10 MG/1
10 TABLET ORAL DAILY
Qty: 30 TABLET | Refills: 0 | Status: SHIPPED | OUTPATIENT
Start: 2022-07-29 | End: 2022-08-26

## 2022-07-29 RX ORDER — LOSARTAN POTASSIUM 25 MG/1
25 TABLET ORAL DAILY
Status: DISCONTINUED | OUTPATIENT
Start: 2022-07-30 | End: 2022-07-30 | Stop reason: HOSPADM

## 2022-07-29 RX ORDER — FUROSEMIDE 40 MG/1
TABLET ORAL
Qty: 7 TABLET | Refills: 0 | Status: SHIPPED | OUTPATIENT
Start: 2022-07-29 | End: 2022-10-30

## 2022-07-29 RX ORDER — HYDROCODONE BITARTRATE AND ACETAMINOPHEN 500; 5 MG/1; MG/1
TABLET ORAL
Status: DISCONTINUED | OUTPATIENT
Start: 2022-07-29 | End: 2022-07-30 | Stop reason: HOSPADM

## 2022-07-29 RX ORDER — ACETAMINOPHEN 325 MG/1
650 TABLET ORAL EVERY 4 HOURS PRN
Status: DISCONTINUED | OUTPATIENT
Start: 2022-07-29 | End: 2022-07-30 | Stop reason: HOSPADM

## 2022-07-29 RX ORDER — ONDANSETRON 8 MG/1
8 TABLET, ORALLY DISINTEGRATING ORAL EVERY 8 HOURS PRN
Status: DISCONTINUED | OUTPATIENT
Start: 2022-07-29 | End: 2022-07-30 | Stop reason: HOSPADM

## 2022-07-29 RX ORDER — TALC
6 POWDER (GRAM) TOPICAL NIGHTLY PRN
Status: CANCELLED | OUTPATIENT
Start: 2022-07-29

## 2022-07-29 RX ORDER — NALOXONE HCL 0.4 MG/ML
0.02 VIAL (ML) INJECTION
Status: DISCONTINUED | OUTPATIENT
Start: 2022-07-29 | End: 2022-07-30 | Stop reason: HOSPADM

## 2022-07-29 RX ADMIN — FUROSEMIDE 40 MG: 10 INJECTION, SOLUTION INTRAMUSCULAR; INTRAVENOUS at 09:07

## 2022-07-29 NOTE — ED PROVIDER NOTES
Source of History:  The patient.     Chief complaint:  Abnormal Labs (Sent by MD after having lab work done. Low H&H, with c/o of weakenss/headache/ringing in ears/and SOB upon exertion. Currently taking Eliquis from CVA/MI in April. )      HPI:  Maria Dolores Tamayo is a 52 y.o. female presenting with headache and dyspnea on exertion for the past 2 days. Her recent blood work revealed low H&H levels. She also reports tinnitus and generalized weakness. She reports taking Advil this morning for her headache with little relief. She also complains of excessive vaginal bleeding but denies any pain. She notes multiple instances of heavy bleeding including 2 weeks ago when she noticed clots and notes excessive bleeding most recently occurred yesterday. She also notes that she observed her feet swollen yesterday. She states she has been on Eliquis since her CVA on 4/6 and has recently been undergoing hyperbaric chamber treatments.    This is the extent to the patients complaints today here in the emergency department.    ROS: As per HPI and below:  General: No fever.  No chills.  Eyes: No visual changes.  ENT: No sore throat. No ear pain. +Tinnitus.   Head: +Headache.    Respiratory: +Dyspnea on exertion.   Cardiovascular: No chest pain.  Abdomen: No abdominal pain.  No nausea or vomiting.  Genito-Urinary: +Vaginal bleeding. No abnormal urination.  Neurologic: +Weakness.  No numbness.  MSK: No back pain. +BL feet swelling.   Integument: No rashes or lesions.  Hematologic: No easy bruising.  Endocrine: No excessive thirst or urination.      Review of patient's allergies indicates:  No Known Allergies    PMH:  As per HPI and below:  Past Medical History:   Diagnosis Date    Abnormal Pap smear of cervix      Past Surgical History:   Procedure Laterality Date    AUGMENTATION OF BREAST      COSMETIC SURGERY         Social History     Tobacco Use    Smoking status: Never Smoker    Smokeless tobacco: Never Used   Substance  "Use Topics    Alcohol use: Not Currently     Comment: rare    Drug use: No       Physical Exam:    BP (!) 124/59   Pulse (!) 54   Temp 98.3 °F (36.8 °C) (Oral)   Resp 17   Ht 5' 4" (1.626 m)   Wt 78 kg (172 lb)   LMP 07/04/2022 (Approximate)   SpO2 100%   BMI 29.52 kg/m²   Nursing note and vital signs reviewed.    Appearance: No acute distress. Pale.  Eyes: No conjunctival injection. Pale conjunctiva.   Neck: No deformity.   ENT: Mild expressive aphasia. Oropharynx clear.  No stridor.   Chest/ Respiratory: Clear to auscultation bilaterally.  Good air movement.  No wheezes.  No rhonchi. No rales. No accessory muscle use.  Cardiovascular: Regular rate and rhythm.  No murmurs. No gallops. No rubs.  Abdomen: Soft.  Not distended.  Nontender.  No guarding.  No rebound. Non-peritoneal.  Musculoskeletal: Bilateral lower extremity edema. Good range of motion all joints.  No deformities.  Neck supple.  No meningismus.  Skin: No rashes seen.  Good turgor.  No abrasions.  No ecchymoses.  Neurologic: Motor intact.  Sensation intact.  Cerebellar intact.  Cranial nerves intact.  Mental Status:  Alert and oriented x 3.  Appropriate, conversant.        I decided to obtain the patient's medical records.  Summary of Medical Records:  In April, the patient had a CVA and MI. She has a history of paroxysmal atrial fibrillation, CHF, an EF of 25%, embolic CVA, and in-hospital cardiac arrest due to ventricular tachycardia. While wearing a life vest, she was alerted that there was an impending shock for the previous admission. During subsequent admission in April, she was treated for sepsis with sigmoid diverticulitis and completed a course of Augmentin. As of 7/18, her ED has recovered and she no longer needs a life vest.         Labs:  Results for orders placed or performed during the hospital encounter of 07/29/22   Type & Screen   Result Value Ref Range    Group & Rh O POS     Indirect [place] NEG    Prepare RBC 2 Units; " emergency   Result Value Ref Range    UNIT NUMBER [id number]     Product Code [id number]     DISPENSE STATUS ISSUED     CODING SYSTEM WYRR705     Unit Blood Type Code 5100     Unit Blood Type O POS     Unit Expiration [id number]     UNIT NUMBER [id number]     Product Code [id number]     DISPENSE STATUS ISSUED     CODING SYSTEM KDIU399     Unit Blood Type Code 5100     Unit Blood Type O POS     Unit Expiration [id number]          Initial Impression  52 y.o. female with menorrhagia on Eliquis due to recent CVA, sent from OB/GYN, bleeding currently controlled. Plan diuresis, transfusion, and admission.      Differential Dx:  Anemia due to acute blood loss from menorrhagia, medication side effect, coagulopathy, Peripheral edema, uncontrolled hypertension, renal insufficiency, liver failure, DVT, cellulitis, edema related to trauma, hypothyroidism       MDM:      I discussed the patient's presentation, findings and response to treatment in the ED with the hospitalist on call who will admit for further treatment and evaluation.  Patient was seen in the emergency department by the OBGYN team, do not recommend any further treatment for vaginal bleeding at this time.                       Diagnostic Impression:    1. Symptomatic anemia    2. On apixaban therapy    3. Menorrhagia with irregular cycle    4. Peripheral edema         ED Disposition Condition    Observation                 Jaja Villagran MD  07/29/22 0149

## 2022-07-29 NOTE — Clinical Note
Diagnosis: Symptomatic anemia [[id number]]   Future Attending Provider: HEVER DANIELS [[id number]]   Is the patient being sent to ED Observation?: No   Admitting Provider:: HEVER DANIELS [[id number]]   Special Needs:: No Special Needs [1]

## 2022-07-29 NOTE — ED TRIAGE NOTES
Pt presents to the ED w/ c/o abnormal labs and sent from MD. Pt reporting low H&H. Pt endorsing weakness and HA, as well as SOB. Pt reporting blood thinner use.

## 2022-07-29 NOTE — TELEPHONE ENCOUNTER
Saw patient in OB GYN clinic with Dr. Abad.  In brief a 52 y.o. lady with history of myocarditis now recovered, AF RVR, CVA, and now with menorrhagia and severe anemia.    Recommending Eliquis be held until active bleeding stabilized, then restarted at 2.5mg twice daily given interaction with amiodarone potentially increasing levels.    Cas Martinez

## 2022-07-29 NOTE — PROCEDURES
Endometrial biopsy    Date/Time: 7/29/2022 3:00 PM  Performed by: Rachel Abad MD  Authorized by: Rachel Abad MD     Consent:     Consent obtained:  Written    Consent given by:  Patient    Patient questions answered: yes      Patient agrees, verbalizes understanding, and wants to proceed: yes      Educational handouts given: no      Instructions and paperwork completed: yes    Indication:     Indications: Menorrhagia    Pre-procedure:     Pre-procedure timeout performed: yes    Procedure:     Cervix cleaned and prepped: yes      A paracervical block was performed: no      An intracervical block was performed: no      The cervix was dilated: yes      Uterus sounded: yes      Uterus sound depth (cm):  10    Specimen collected: specimen collected and sent to pathology      Patient tolerated procedure well with no complications: yes

## 2022-07-29 NOTE — PROGRESS NOTES
"  .  SUBJECTIVE:   52 y.o. female  presents today complaining of heavy vaginal bleeding for 10 days. She reports passing clots and soaking through her clothes. . Patient's last menstrual period was 2022 (approximate)..  She reports feeling weak and SOB when she goes up her stairs. She has a known history of fibroids and has had heavy bleeding in the past but "not like this." Of note she had a CVA and MI in April. She is followed by [facility] and is on Eloquis. She had not had a period since 2022 until this episode.   .        Past Medical History:   Diagnosis Date    Abnormal Pap smear of cervix      Past Surgical History:   Procedure Laterality Date    AUGMENTATION OF BREAST      COSMETIC SURGERY       Social History     Socioeconomic History    Marital status:    Tobacco Use    Smoking status: Never Smoker    Smokeless tobacco: Never Used   Substance and Sexual Activity    Alcohol use: Not Currently     Comment: rare    Drug use: No    Sexual activity: Yes     Partners: Male     Birth control/protection: None     Family History   Problem Relation Age of Onset    Ovarian cancer Maternal Aunt 60    Breast cancer Neg Hx     Cancer Neg Hx     Colon cancer Neg Hx      OB History    Para Term  AB Living   2 2 1         SAB IAB Ectopic Multiple Live Births                  # Outcome Date GA Lbr Nima/2nd Weight Sex Delivery Anes PTL Lv   2 Term            1 Para      Vag-Spont              Current Outpatient Medications   Medication Sig Dispense Refill    losartan (COZAAR) 25 MG tablet Take 1 tablet (25 mg total) by mouth once daily. 90 tablet 3    amiodarone (PACERONE) 200 MG Tab Take 1 tablet (200 mg total) by mouth once daily. (Patient not taking: Reported on 2022) 30 tablet 11    apixaban (ELIQUIS) 2.5 mg Tab Take 1 tablet (2.5 mg total) by mouth 2 (two) times daily. 60 tablet 11    COVID-19 vacc,mRNA,Moderna,/PF (MODERNA COVID-19 VACCINE, EUA, " IM)       furosemide (LASIX) 40 MG tablet Take 1 tablet prn swelling 7 tablet 0    medroxyPROGESTERone (PROVERA) 10 MG tablet Take 1 tablet (10 mg total) by mouth once daily. 30 tablet 0     No current facility-administered medications for this visit.     Allergies: Patient has no known allergies.     The ASCVD Risk score (Elmer ITZ Siddiqui, et al., 2013) failed to calculate for the following reasons:    The patient has a prior MI or stroke diagnosis      ROS:  Constitutional: no weight loss, weight gain, fever,+ fatigue  Eyes:  No vision changes, glasses/contacts  ENT/Mouth: No ulcers, sinus problems, ears ringing, headache  Cardiovascular: No inability to lie flat, chest pain, exercise intolerance, swelling, heart palpitations, +SOB  Respiratory: No wheezing, coughing blood, shortness of breath, or cough  Gastrointestinal: No diarrhea, bloody stool, nausea/vomiting, constipation, gas, hemorrhoids  Genitourinary: No blood in urine, painful urination, urgency of urination, frequency of urination, incomplete emptying, incontinence,+ abnormal bleeding, painful periods,+ heavy periods, vaginal discharge, vaginal odor, painful intercourse, sexual problems, bleeding after intercourse.  Musculoskeletal: No muscle weakness  Skin/Breast: No painful breasts, nipple discharge, masses, rash, ulcers  Neurological: No passing out, seizures, numbness, headache  Endocrine: No diabetes, hypothyroid, hyperthyroid, hot flashes, hair loss, abnormal hair growth, acne  Psychiatric: No depression, crying  Hematologic: No bruises, bleeding, swollen lymph nodes, anemia.      Physical Exam  General- well developed, well nourished  Vulva- no masses, no lesions, +minimal blood in vagina  Vagina-  no masses, no lesions  Cervix- no Cervical motion tenderness, no lesions  Uterus- normal size, nontender  Adnexa- nontender, no masses    Endometrial biopsy done- see procedure note- no additional bleeding noted    ASSESSMENT:   1. Other fatigue  CBC  Auto Differential    Comprehensive Metabolic Panel    TSH    Iron and TIBC    Ferritin    Endometrial biopsy   2. Menorrhagia with irregular cycle  CBC Auto Differential    Comprehensive Metabolic Panel    TSH    Iron and TIBC    Ferritin    Follicle Stimulating Hormone    Estradiol    Specimen to Pathology Gynecology and Obstetrics    Endometrial biopsy   3. Cerebrovascular accident (CVA) due to embolism of vertebral artery, unspecified blood vessel laterality     4. Atrial fibrillation with RVR     5. Uterine leiomyoma, unspecified location           PLAN:   Alanis consult with Cardiology in clinic and her Cardiologist was messaged. Will hold Eloquis dose until her bleeding is under control- likely tonight and tomorrow. Will restart at 2.5mg twice daily- prescription [facility]. Continue her other medications.  Provera sent into her pharmacy to be used if she continues to have heavy bleeding. Discussed with GYN ONC  Labs today- CBC, CMP, Iron studies, FSH and Estradiol and TSH    Critical value called to clinic H/H 6.3/19.4  Discussed with Cardiology- patient needs transfusion  Spoke to house supervisor- currently no OBs beds  Will send to ED  Called patient and counseled her regarding need for blood transfusion and to proceed to [facility]  Spoke with ED staff- transfuse PRBCs, give Lasix 40mg per Cardiology recommendations. Hold Eloquis dose  Counseled patient that she will likely be discharged after blood transfusion. Hold Eloquis tonight and tomorrow. Will restart Eloquis at 2.5mg bid  GYN resident aware to check on patient in ED to asess vaginal bleeding.

## 2022-07-30 VITALS
HEIGHT: 64 IN | OXYGEN SATURATION: 99 % | SYSTOLIC BLOOD PRESSURE: 112 MMHG | WEIGHT: 172 LBS | BODY MASS INDEX: 29.37 KG/M2 | HEART RATE: 54 BPM | TEMPERATURE: 98 F | DIASTOLIC BLOOD PRESSURE: 52 MMHG | RESPIRATION RATE: 17 BRPM

## 2022-07-30 PROBLEM — D64.9 SYMPTOMATIC ANEMIA: Status: RESOLVED | Noted: 2022-07-29 | Resolved: 2022-07-30

## 2022-07-30 LAB
ALBUMIN SERPL BCP-MCNC: 3.2 G/DL (ref 3.5–5.2)
ALP SERPL-CCNC: 59 U/L (ref 55–135)
ALT SERPL W/O P-5'-P-CCNC: 18 U/L (ref 10–44)
ANION GAP SERPL CALC-SCNC: 11 MMOL/L (ref 8–16)
AST SERPL-CCNC: 17 U/L (ref 10–40)
BASOPHILS # BLD AUTO: 0.05 K/UL (ref 0–0.2)
BASOPHILS NFR BLD: 0.5 % (ref 0–1.9)
BILIRUB SERPL-MCNC: 0.4 MG/DL (ref 0.1–1)
BNP SERPL-MCNC: 73 PG/ML (ref 0–99)
BUN SERPL-MCNC: 15 MG/DL (ref 6–20)
CALCIUM SERPL-MCNC: 8 MG/DL (ref 8.7–10.5)
CHLORIDE SERPL-SCNC: 105 MMOL/L (ref 95–110)
CO2 SERPL-SCNC: 22 MMOL/L (ref 23–29)
CREAT SERPL-MCNC: 0.9 MG/DL (ref 0.5–1.4)
DIFFERENTIAL METHOD: ABNORMAL
EOSINOPHIL # BLD AUTO: 0.1 K/UL (ref 0–0.5)
EOSINOPHIL NFR BLD: 1.1 % (ref 0–8)
ERYTHROCYTE [DISTWIDTH] IN BLOOD BY AUTOMATED COUNT: 15 % (ref 11.5–14.5)
EST. GFR  (AFRICAN AMERICAN): >60 ML/MIN/1.73 M^2
EST. GFR  (NON AFRICAN AMERICAN): >60 ML/MIN/1.73 M^2
GLUCOSE SERPL-MCNC: 91 MG/DL (ref 70–110)
HCT VFR BLD AUTO: 25.3 % (ref 37–48.5)
HGB BLD-MCNC: 8.8 G/DL (ref 12–16)
IMM GRANULOCYTES # BLD AUTO: 0.07 K/UL (ref 0–0.04)
IMM GRANULOCYTES NFR BLD AUTO: 0.7 % (ref 0–0.5)
LYMPHOCYTES # BLD AUTO: 1.6 K/UL (ref 1–4.8)
LYMPHOCYTES NFR BLD: 15.1 % (ref 18–48)
MAGNESIUM SERPL-MCNC: 2 MG/DL (ref 1.6–2.6)
MCH RBC QN AUTO: 29.9 PG (ref 27–31)
MCHC RBC AUTO-ENTMCNC: 34.8 G/DL (ref 32–36)
MCV RBC AUTO: 86 FL (ref 82–98)
MONOCYTES # BLD AUTO: 0.7 K/UL (ref 0.3–1)
MONOCYTES NFR BLD: 6.4 % (ref 4–15)
NEUTROPHILS # BLD AUTO: 8 K/UL (ref 1.8–7.7)
NEUTROPHILS NFR BLD: 76.2 % (ref 38–73)
NRBC BLD-RTO: 0 /100 WBC
PHOSPHATE SERPL-MCNC: 3.3 MG/DL (ref 2.7–4.5)
PLATELET # BLD AUTO: 316 K/UL (ref 150–450)
PMV BLD AUTO: 10 FL (ref 9.2–12.9)
POTASSIUM SERPL-SCNC: 3.5 MMOL/L (ref 3.5–5.1)
PROT SERPL-MCNC: 5.9 G/DL (ref 6–8.4)
RBC # BLD AUTO: 2.94 M/UL (ref 4–5.4)
SODIUM SERPL-SCNC: 138 MMOL/L (ref 136–145)
WBC # BLD AUTO: 10.52 K/UL (ref 3.9–12.7)

## 2022-07-30 PROCEDURE — 80053 COMPREHEN METABOLIC PANEL: CPT | Performed by: NURSE PRACTITIONER

## 2022-07-30 PROCEDURE — 85025 COMPLETE CBC W/AUTO DIFF WBC: CPT | Performed by: NURSE PRACTITIONER

## 2022-07-30 PROCEDURE — 84100 ASSAY OF PHOSPHORUS: CPT | Performed by: NURSE PRACTITIONER

## 2022-07-30 PROCEDURE — 99236 HOSP IP/OBS SAME DATE HI 85: CPT | Mod: ,,, | Performed by: NURSE PRACTITIONER

## 2022-07-30 PROCEDURE — G0378 HOSPITAL OBSERVATION PER HR: HCPCS

## 2022-07-30 PROCEDURE — 99236 PR OBSERV/HOSP SAME DATE,LEVL V: ICD-10-PCS | Mod: ,,, | Performed by: NURSE PRACTITIONER

## 2022-07-30 PROCEDURE — 83735 ASSAY OF MAGNESIUM: CPT | Performed by: NURSE PRACTITIONER

## 2022-07-30 PROCEDURE — 83880 ASSAY OF NATRIURETIC PEPTIDE: CPT | Performed by: NURSE PRACTITIONER

## 2022-07-30 NOTE — DISCHARGE INSTRUCTIONS
Resume your eliquis at the new dose of 2.5mg twice daily starting tomorrow as long as bleeding continues to subside.  
I, Izabel Rascon, performed the initial face to face bedside interview with this patient regarding history of present illness, review of symptoms and relevant past medical, social and family history.  I completed an independent physical examination.  I was the initial provider who evaluated this patient. I have signed out the follow up of any pending tests (i.e. labs, radiological studies) to the ACP.  I have communicated the patient’s plan of care and disposition with the ACP.

## 2022-07-30 NOTE — ED NOTES
Pt awake and alert; resting quietly on stretcher with lights off. Pt remains on continuous cardiac and pulse ox monitoring with non-invasive blood pressure to cycle every 60 minutes. Pt denies pain at this time; no acute distress or discomfort reported or observed. Pt denies restroom needs at this time; is able to reposition self on stretcher. Bed locked in lowest position; side rails up and locked x 2; call light, bedside table, and personal belongings within reach. Room assessed for safety measures and cleanliness; no action needed at this time. Plan of care discussed. Pt instructed to alert nurse for assistance and before attempting to get out of bed; verbalizes understanding. Pt denies needs or complaints at this time; will continue to monitor. Visitor at bedside.

## 2022-07-30 NOTE — HPI
The patient is a 52 y.o. female with a past medical history of CHF, cardiac arrest, atrial fib with RVR, and CVA who presents with headache and dyspnea on exertion for the past 2 days. Her recent blood work revealed low H&H levels. She also reports tinnitus and generalized weakness.  She also complains of excessive vaginal bleeding but denies any pain. She notes multiple instances of heavy bleeding including 2 weeks ago when she noticed clots and notes excessive bleeding most recently occurred yesterday. She also notes that she observed her feet swollen yesterday. She states she has been on Eliquis since her CVA on 4/6 and has recently been undergoing hyperbaric chamber treatments.  The patient was seen by her OB/Gyn today and prescribed Provera and also seen by her Cardiologist with recommendations.  Labwork was showing a decreased hemoglobin.  She will be admitted for further management of her acute symptomatic anemia.

## 2022-07-30 NOTE — ASSESSMENT & PLAN NOTE
Patient with chronic atrial fibrillation.  Per chart, she is supposed to be on amiodarone but states that she does not take.  Hold apixaban with active bleeding for now

## 2022-07-30 NOTE — CARE UPDATE
MD called to bedside per primary OB/GYN. In short, Maria Dolores Tamayo is 52 y.o.  pt of Dr. Abad's who is presenting for symptoms of anemia and known hGb of 6.3 2/2 to heavy vaginal bleeding. Pt on BID elliquis due to CVA/MI on . Pt currently only endorses spotting but prior vaginal bleeding was soaking though 1-2 pads an hour. She had a pelvic exam in Dr. Abad's office earlier today. Repeat pelvic exam deferred due to decrease in bleeding. Pt states she did not take any provera pills before presenting to ED. Plan is for her to receive lasix and 2u pRBCs. Will defer treatment with provera right now given lightened bleeding. Pt understands to take provera if bleeding increases. ED team aware. Please page with any questions.     Ayaka Contreras MD  OB-GYN, PGY-1

## 2022-07-30 NOTE — SUBJECTIVE & OBJECTIVE
Past Medical History:   Diagnosis Date    Abnormal Pap smear of cervix     Anticoagulant long-term use     CHF (congestive heart failure)     Stroke        Past Surgical History:   Procedure Laterality Date    AUGMENTATION OF BREAST      COSMETIC SURGERY         Review of patient's allergies indicates:  No Known Allergies    No current facility-administered medications on file prior to encounter.     Current Outpatient Medications on File Prior to Encounter   Medication Sig    amiodarone (PACERONE) 200 MG Tab Take 1 tablet (200 mg total) by mouth once daily. (Patient not taking: Reported on 7/29/2022)    apixaban (ELIQUIS) 2.5 mg Tab Take 1 tablet (2.5 mg total) by mouth 2 (two) times daily.    COVID-19 vacc,mRNA,Moderna,/PF (MODERNA COVID-19 VACCINE, EUA, IM)     furosemide (LASIX) 40 MG tablet Take 1 tablet prn swelling    losartan (COZAAR) 25 MG tablet Take 1 tablet (25 mg total) by mouth once daily.    medroxyPROGESTERone (PROVERA) 10 MG tablet Take 1 tablet (10 mg total) by mouth once daily.     Family History       Problem Relation (Age of Onset)    Ovarian cancer Maternal Aunt (60)          Tobacco Use    Smoking status: Never Smoker    Smokeless tobacco: Never Used   Substance and Sexual Activity    Alcohol use: Not Currently     Comment: rare    Drug use: No    Sexual activity: Yes     Partners: Male     Birth control/protection: None     Review of Systems   Constitutional:  Positive for activity change and fatigue. Negative for appetite change and fever.   HENT:  Negative for congestion, ear pain, rhinorrhea and sinus pressure.    Eyes:  Negative for pain and discharge.   Respiratory:  Negative for cough, chest tightness, shortness of breath and wheezing.    Cardiovascular:  Negative for chest pain and leg swelling.   Gastrointestinal:  Negative for abdominal distention, abdominal pain, diarrhea, nausea and vomiting.   Endocrine: Negative for cold intolerance and heat intolerance.   Genitourinary:   Negative for difficulty urinating, flank pain, frequency, hematuria and urgency.   Musculoskeletal:  Negative for arthralgias, joint swelling and myalgias.   Allergic/Immunologic: Negative for environmental allergies and food allergies.   Neurological:  Positive for dizziness, weakness and light-headedness. Negative for headaches.   Hematological:  Does not bruise/bleed easily.   Psychiatric/Behavioral:  Negative for agitation, behavioral problems and decreased concentration.    Objective:     Vital Signs (Most Recent):  Temp: 98.3 °F (36.8 °C) (07/29/22 2330)  Pulse: (!) 59 (07/29/22 2330)  Resp: (!) 30 (07/29/22 2330)  BP: (!) 118/52 (07/29/22 2330)  SpO2: 98 % (07/29/22 2330)   Vital Signs (24h Range):  Temp:  [98 °F (36.7 °C)-98.4 °F (36.9 °C)] 98.3 °F (36.8 °C)  Pulse:  [48-59] 59  Resp:  [17-30] 30  SpO2:  [98 %-100 %] 98 %  BP: (102-135)/(47-61) 118/52     Weight: 78 kg (172 lb)  Body mass index is 29.52 kg/m².    Physical Exam  Constitutional:       Appearance: Normal appearance. She is well-developed.   HENT:      Head: Normocephalic.   Eyes:      General:         Right eye: No discharge.         Left eye: No discharge.      Conjunctiva/sclera: Conjunctivae normal.   Cardiovascular:      Rate and Rhythm: Bradycardia present.      Pulses:           Radial pulses are 2+ on the right side and 2+ on the left side.      Heart sounds: Normal heart sounds.   Pulmonary:      Effort: Pulmonary effort is normal. Tachypnea present. No respiratory distress.      Breath sounds: Normal breath sounds.   Abdominal:      General: Bowel sounds are normal. There is no distension.      Palpations: Abdomen is soft.      Tenderness: There is no abdominal tenderness.   Musculoskeletal:         General: Normal range of motion.      Cervical back: Normal range of motion and neck supple.   Skin:     General: Skin is warm and dry.      Coloration: Skin is pale.   Neurological:      Mental Status: She is alert and oriented to person,  place, and time.      GCS: GCS eye subscore is 4. GCS verbal subscore is 5. GCS motor subscore is 6.      Motor: Motor function is intact.   Psychiatric:         Mood and Affect: Mood normal.         Speech: Speech normal.         Behavior: Behavior normal.         Thought Content: Thought content normal.           Significant Labs: All pertinent labs within the past 24 hours have been reviewed.  CBC:   Recent Labs   Lab 07/29/22  1555   WBC 8.28   HGB 6.3*   HCT 19.4*        CMP:   Recent Labs   Lab 07/29/22  1555      K 4.3      CO2 22*   *   BUN 11   CREATININE 0.9   CALCIUM 8.9   PROT 6.6   ALBUMIN 3.6   BILITOT 0.3   ALKPHOS 60   AST 15   ALT 17   ANIONGAP 12   EGFRNONAA >60       Significant Imaging: I have reviewed all pertinent imaging results/findings within the past 24 hours.

## 2022-07-30 NOTE — ED NOTES
Resumed pt care. 52 YOF presents to ED with c/o vaginal bleeding X 1 month with no improvement. Stated going through 10 pads daily. Currently denies any vaginal bleeding. -cp -sob A&Ox4. Denies any other compliants.     LOC: The patient is awake, alert and aware of environment with an appropriate affect, the patient is oriented x 3.  APPEARANCE: Patient resting comfortably and in no acute distress, patient is clean and well groomed, patient's clothing is properly fastened.  SKIN: The skin is warm and dry, patient has normal skin turgor and moist mucus membranes, skin intact, no breakdown or brusing noted.  MUSKULOSKELETAL: Patient moving all extremities well, no obvious swelling or deformities noted.  RESPIRATORY: Airway is open and patent, respirations are spontaneous, patient has a normal effort and rate.  CARDIAC: No peripheral edema.  ABDOMEN: Soft and + tenderness to palpation, no distention noted.     Pt pending bed assignment Dx Anemia. VSS. Safety measures in place; side rails up x2. Call light within pt reach. Will continue to monitor.

## 2022-07-30 NOTE — ASSESSMENT & PLAN NOTE
Hemoglobin- 6.3, baseline around 10.9; active vaginal bleeding    Hold Apixaban  Transfuse 2 units PRBC in ER  CBC in AM unless significant active bleeding

## 2022-07-30 NOTE — H&P
[facility] Medicine  History & Physical    Patient Name: Maria Dolores Tamayo  MRN: [id number]  Patient Class: OP- Observation  Admission Date: 7/29/2022  Attending Physician: Jaja Villagran MD   Primary Care Provider: Jamie Kirkpatrick MD         Patient information was obtained from patient, past medical records and ER records.     Subjective:     Principal Problem:Symptomatic anemia    Chief Complaint:   Chief Complaint   Patient presents with    Abnormal Labs     Sent by MD after having lab work done. Low H&H, with c/o of weakenss/headache/ringing in ears/and SOB upon exertion. Currently taking Eliquis from CVA/MI in April.         HPI: The patient is a 52 y.o. female with a past medical history of CHF, cardiac arrest, atrial fib with RVR, and CVA who presents with headache and dyspnea on exertion for the past 2 days. Her recent blood work revealed low H&H levels. She also reports tinnitus and generalized weakness.  She also complains of excessive vaginal bleeding but denies any pain. She notes multiple instances of heavy bleeding including 2 weeks ago when she noticed clots and notes excessive bleeding most recently occurred yesterday. She also notes that she observed her feet swollen yesterday. She states she has been on Eliquis since her CVA on 4/6 and has recently been undergoing hyperbaric chamber treatments.  The patient was seen by her OB/Gyn today and prescribed Provera and also seen by her Cardiologist with recommendations.  Labwork was showing a decreased hemoglobin.  She will be admitted for further management of her acute symptomatic anemia.      Past Medical History:   Diagnosis Date    Abnormal Pap smear of cervix     Anticoagulant long-term use     CHF (congestive heart failure)     Stroke        Past Surgical History:   Procedure Laterality Date    AUGMENTATION OF BREAST      COSMETIC SURGERY         Review of patient's allergies indicates:  No Known Allergies    No current  facility-administered medications on file prior to encounter.     Current Outpatient Medications on File Prior to Encounter   Medication Sig    amiodarone (PACERONE) 200 MG Tab Take 1 tablet (200 mg total) by mouth once daily. (Patient not taking: Reported on 7/29/2022)    apixaban (ELIQUIS) 2.5 mg Tab Take 1 tablet (2.5 mg total) by mouth 2 (two) times daily.    COVID-19 vacc,mRNA,Moderna,/PF (MODERNA COVID-19 VACCINE, EUA, IM)     furosemide (LASIX) 40 MG tablet Take 1 tablet prn swelling    losartan (COZAAR) 25 MG tablet Take 1 tablet (25 mg total) by mouth once daily.    medroxyPROGESTERone (PROVERA) 10 MG tablet Take 1 tablet (10 mg total) by mouth once daily.     Family History       Problem Relation (Age of Onset)    Ovarian cancer Maternal Aunt (60)          Tobacco Use    Smoking status: Never Smoker    Smokeless tobacco: Never Used   Substance and Sexual Activity    Alcohol use: Not Currently     Comment: rare    Drug use: No    Sexual activity: Yes     Partners: Male     Birth control/protection: None     Review of Systems   Constitutional:  Positive for activity change and fatigue. Negative for appetite change and fever.   HENT:  Negative for congestion, ear pain, rhinorrhea and sinus pressure.    Eyes:  Negative for pain and discharge.   Respiratory:  Negative for cough, chest tightness, shortness of breath and wheezing.    Cardiovascular:  Negative for chest pain and leg swelling.   Gastrointestinal:  Negative for abdominal distention, abdominal pain, diarrhea, nausea and vomiting.   Endocrine: Negative for cold intolerance and heat intolerance.   Genitourinary:  Negative for difficulty urinating, flank pain, frequency, hematuria and urgency.   Musculoskeletal:  Negative for arthralgias, joint swelling and myalgias.   Allergic/Immunologic: Negative for environmental allergies and food allergies.   Neurological:  Positive for dizziness, weakness and light-headedness. Negative for headaches.    Hematological:  Does not bruise/bleed easily.   Psychiatric/Behavioral:  Negative for agitation, behavioral problems and decreased concentration.    Objective:     Vital Signs (Most Recent):  Temp: 98.3 °F (36.8 °C) (07/29/22 2330)  Pulse: (!) 59 (07/29/22 2330)  Resp: (!) 30 (07/29/22 2330)  BP: (!) 118/52 (07/29/22 2330)  SpO2: 98 % (07/29/22 2330)   Vital Signs (24h Range):  Temp:  [98 °F (36.7 °C)-98.4 °F (36.9 °C)] 98.3 °F (36.8 °C)  Pulse:  [48-59] 59  Resp:  [17-30] 30  SpO2:  [98 %-100 %] 98 %  BP: (102-135)/(47-61) 118/52     Weight: 78 kg (172 lb)  Body mass index is 29.52 kg/m².    Physical Exam  Constitutional:       Appearance: Normal appearance. She is well-developed.   HENT:      Head: Normocephalic.   Eyes:      General:         Right eye: No discharge.         Left eye: No discharge.      Conjunctiva/sclera: Conjunctivae normal.   Cardiovascular:      Rate and Rhythm: Bradycardia present.      Pulses:           Radial pulses are 2+ on the right side and 2+ on the left side.      Heart sounds: Normal heart sounds.   Pulmonary:      Effort: Pulmonary effort is normal. Tachypnea present. No respiratory distress.      Breath sounds: Normal breath sounds.   Abdominal:      General: Bowel sounds are normal. There is no distension.      Palpations: Abdomen is soft.      Tenderness: There is no abdominal tenderness.   Musculoskeletal:         General: Normal range of motion.      Cervical back: Normal range of motion and neck supple.   Skin:     General: Skin is warm and dry.      Coloration: Skin is pale.   Neurological:      Mental Status: She is alert and oriented to person, place, and time.      GCS: GCS eye subscore is 4. GCS verbal subscore is 5. GCS motor subscore is 6.      Motor: Motor function is intact.   Psychiatric:         Mood and Affect: Mood normal.         Speech: Speech normal.         Behavior: Behavior normal.         Thought Content: Thought content normal.           Significant  Labs: All pertinent labs within the past 24 hours have been reviewed.  CBC:   Recent Labs   Lab 07/29/22  1555   WBC 8.28   HGB 6.3*   HCT 19.4*        CMP:   Recent Labs   Lab 07/29/22  1555      K 4.3      CO2 22*   *   BUN 11   CREATININE 0.9   CALCIUM 8.9   PROT 6.6   ALBUMIN 3.6   BILITOT 0.3   ALKPHOS 60   AST 15   ALT 17   ANIONGAP 12   EGFRNONAA >60       Significant Imaging: I have reviewed all pertinent imaging results/findings within the past 24 hours.    Assessment/Plan:     * Symptomatic anemia  Hemoglobin- 6.3, baseline around 10.9; active vaginal bleeding    Hold Apixaban  Transfuse 2 units PRBC in ER  CBC in AM unless significant active bleeding       Atrial fibrillation  Patient with chronic atrial fibrillation.  Per chart, she is supposed to be on amiodarone but states that she does not take.  Hold apixaban with active bleeding for now      Chronic systolic heart failure  Patient appears well compensated.  Monitor for acute decompensation. BNP pending        VTE Risk Mitigation (From admission, onward)         Ordered     IP VTE LOW RISK PATIENT  Once         07/29/22 2220     Place sequential compression device  Until discontinued         07/29/22 2220                   Contreras Betancourt NP  Department of Hospital Medicine   [facility] - Emergency Dept

## 2022-08-01 ENCOUNTER — TELEPHONE (OUTPATIENT)
Dept: CARDIOLOGY | Facility: CLINIC | Age: 52
End: 2022-08-01
Payer: COMMERCIAL

## 2022-08-01 DIAGNOSIS — I48.91 ATRIAL FIBRILLATION WITH RVR: Primary | ICD-10-CM

## 2022-08-01 NOTE — DISCHARGE SUMMARY
"[facility] Medicine  Discharge Summary      Patient Name: Maria Dolores Tamayo  MRN: [id number]  Patient Class: OP- Observation  Admission Date: 7/29/2022  Hospital Length of Stay: 0 days  Discharge Date and Time: 7/30/2022  9:17 AM  Attending Physician: No att. providers found   Discharging Provider: Stacie Moore PA-C  Primary Care Provider: Jamie Kirkpatrick MD      HPI:   Per Contreras Betancourt NP:  "The patient is a 52 y.o. female with a past medical history of CHF, cardiac arrest, atrial fib with RVR, and CVA who presents with headache and dyspnea on exertion for the past 2 days. Her recent blood work revealed low H&H levels. She also reports tinnitus and generalized weakness.  She also complains of excessive vaginal bleeding but denies any pain. She notes multiple instances of heavy bleeding including 2 weeks ago when she noticed clots and notes excessive bleeding most recently occurred yesterday. She also notes that she observed her feet swollen yesterday. She states she has been on Eliquis since her CVA on 4/6 and has recently been undergoing hyperbaric chamber treatments.  The patient was seen by her OB/Gyn today and prescribed Provera and also seen by her Cardiologist with recommendations.  Labwork was showing a decreased hemoglobin.  She will be admitted for further management of her acute symptomatic anemia."          Hospital Course:    The patient was transfused 2 units PRBCs with appropriate response.  Vaginal bleeding decreased.  Plan had been agreed upon by her gynecologist and cardiologist to hold the Eliquis until bleeding was under control and resume at 2.5 mg b.i.d..  She is to resume this dose tomorrow.  She is to take the Provera sent into her pharmacy by her gynecologist if heavy bleeding returns.    Patient seen and examined by me on discharge day.  Questions were sought and answered to patient's satisfaction.  Patient (and family) aware and in agreement with discharge " plan.    Goals of Care Treatment Preferences:  Code Status: Full Code      Consults: OB/GYN      Final Active Diagnoses:    Diagnosis Date Noted POA    Atrial fibrillation [I48.91] 07/29/2022 Yes    Chronic systolic heart failure [I50.22] 04/22/2022 Yes      Problems Resolved During this Admission:    Diagnosis Date Noted Date Resolved POA    PRINCIPAL PROBLEM:  Symptomatic anemia [D64.9] 07/29/2022 07/30/2022 Yes       Discharged Condition: good    Disposition: Home or Self Care    Follow Up:   Follow-up Information     Rachel Abad MD Follow up.    Specialties: Obstetrics, Obstetrics and Gynecology  Contact information:  [address]  [facility] [address]  [phone]             Cas Martinez MD Follow up.    Specialties: Cardiovascular Disease, Cardiology  Contact information:  [address]  [facility] [address]  [phone]                       Patient Instructions:      Diet Cardiac     Notify your health care provider if you experience any of the following:  temperature >100.4     Notify your health care provider if you experience any of the following:  persistent dizziness, light-headedness, or visual disturbances     Activity as tolerated       Significant Diagnostic Studies:    Recent Labs   Lab 07/30/22  0154   WBC 10.52   HGB 8.8*   HCT 25.3*            Pending Diagnostic Studies:     None         Medications:  Reconciled Home Medications:      Medication List      CONTINUE taking these medications    amiodarone 200 MG Tab  Commonly known as: PACERONE  Take 1 tablet (200 mg total) by mouth once daily.     apixaban 2.5 mg Tab  Commonly known as: ELIQUIS  Take 1 tablet (2.5 mg total) by mouth 2 (two) times daily.     furosemide 40 MG tablet  Commonly known as: LASIX  Take 1 tablet prn swelling     losartan 25 MG tablet  Commonly known as: COZAAR  Take 1 tablet (25 mg total) by mouth once daily.     medroxyPROGESTERone 10 MG tablet  Commonly known as:  PROVERA  Take 1 tablet (10 mg total) by mouth once daily.     MODERNA COVID-19 VACCINE (EUA) IM            Indwelling Lines/Drains at time of discharge:   Lines/Drains/Airways     None                 Time spent on the discharge of patient: 30 minutes         Stacie Moore PA-C  Department of Hospital Medicine  [facility] - Emergency Dept

## 2022-08-02 ENCOUNTER — PATIENT MESSAGE (OUTPATIENT)
Dept: ELECTROPHYSIOLOGY | Facility: CLINIC | Age: 52
End: 2022-08-02
Payer: COMMERCIAL

## 2022-08-02 ENCOUNTER — HOSPITAL ENCOUNTER (OUTPATIENT)
Dept: RADIOLOGY | Facility: OTHER | Age: 52
Discharge: HOME | End: 2022-08-02
Attending: OBSTETRICS & GYNECOLOGY
Payer: COMMERCIAL

## 2022-08-02 DIAGNOSIS — N92.1 MENORRHAGIA WITH IRREGULAR CYCLE: ICD-10-CM

## 2022-08-02 PROCEDURE — 76856 US EXAM PELVIC COMPLETE: CPT | Mod: 26,,, | Performed by: RADIOLOGY

## 2022-08-02 PROCEDURE — 76856 US PELVIS COMP WITH TRANSVAG NON-OB (XPD): ICD-10-PCS | Mod: 26,,, | Performed by: RADIOLOGY

## 2022-08-02 PROCEDURE — 76830 TRANSVAGINAL US NON-OB: CPT | Mod: 26,,, | Performed by: RADIOLOGY

## 2022-08-02 PROCEDURE — 76830 TRANSVAGINAL US NON-OB: CPT | Mod: TC

## 2022-08-02 PROCEDURE — 76830 US PELVIS COMP WITH TRANSVAG NON-OB (XPD): ICD-10-PCS | Mod: 26,,, | Performed by: RADIOLOGY

## 2022-08-03 ENCOUNTER — TELEPHONE (OUTPATIENT)
Dept: GYNECOLOGIC ONCOLOGY | Facility: CLINIC | Age: 52
End: 2022-08-03
Payer: COMMERCIAL

## 2022-08-03 NOTE — TELEPHONE ENCOUNTER
----- Message from Mick Brunson RN sent at 8/3/2022  4:16 PM CDT -----    ----- Message -----  From: Rachel Abad MD  Sent: 8/3/2022   3:46 PM CDT  To: [facility] Staff    I spoke with Dr. Bermudez about this patient.   Can you please reach out and get her scheduled?

## 2022-08-03 NOTE — TELEPHONE ENCOUNTER
Spoke with our patient about her insurance, schedule appointment she voiced understanding of the date, time and location. All questions answered appointment mail. Provider Scheduling Coord.  Gynecologic Oncology MA/PAR /Preceptor Kyle Pagan

## 2022-08-04 NOTE — PROGRESS NOTES
Subjective:      Patient ID: Maria Dolores Tamayo is a 52 y.o. female.    Chief Complaint: Consult      HPI     52 yr old para 1 referred from Dr. Abad for heavy vaginal bleeding/known fibroids. Presented with recent onset of VB after no cycle since February. Heavy cycle from 7/4 until two days ago. Given Provera but did not start.  MI and CVA in April, 2022 on Eliquis. Long term anticoagulation for history of A fib.  Symptomatic with H&H 6.3/19.4 and fatigue. Transfused to 8.8/25.3.   Scheduled for a cardiac ablation after she completes hyperbarics for CVA-related aphasia.    Pelvic US 8/2/2022  Uterus 11.0 x 7.7 x 9.0 cm, ES19.2 mm. Heterogeneous. 2 fibroids measuring 6.8 and 3.1 cm  Right ovary NV  Left ovary measures 3.9 x 3.2 x 4.3 cm, left ovarian cyst measuring 3.6 cm.     Of note, CT A/P 4/2022 (in ED for leukocytosis with known diverticulosis)  Lobular contour of the uterus with heterogeneous masses consistent with uterine fibroids.  Largest fibroid in the right fundus, approximately 7 cm, appears to be centrally necrotic.  Scattered colonic diverticula with significant surrounding inflammatory change about the sigmoid colon with colonic wall thickening.  There is associated bowel wall edema in some the adjacent loops of small bowel, likely due to acute diverticulitis.  No discrete fluid collection or free intraperitoneal air.  No evidence of bowel obstruction. Appendix is unremarkable.  Significant mesenteric stranding about the lower abdomen and pelvis with small volume free fluid.  No free intraperitoneal air.  No significant adenopathy.    EMBx negative    Last pap smear 12/2021 LSIL, + HR HPV 16/other  1/20/2022 Colpo mild dysplasia/ LSIL changes  ECC HPV effect    Medical co morbidities include MI/CVA as above. Afib, CHF w/ EF of 25% with life vest 12 yrs ago (due to viral carditis?). EF currently 55%. On Eliquis, Amiodarone    Prior abdominoplasty. UA embolization around 2000 due to known fibroids, DUB.      Family history for mat aunt - ovarian cancer (60). No breast/colon cancers.    Review of Systems   Constitutional: Positive for fatigue (resolved after transfusion). Negative for appetite change, chills, diaphoresis, fever and unexpected weight change.   Respiratory: Negative for cough, chest tightness, shortness of breath and wheezing.    Cardiovascular: Negative for chest pain, palpitations and leg swelling.   Gastrointestinal: Negative for abdominal distention, abdominal pain, blood in stool, constipation, diarrhea, nausea and vomiting.   Genitourinary: Positive for menstrual problem and vaginal bleeding (resolved two days ago). Negative for difficulty urinating, dysuria, flank pain, frequency, hematuria, pelvic pain, vaginal discharge and vaginal pain.   Musculoskeletal: Negative for arthralgias and back pain.   Skin: Negative for color change and rash.   Neurological: Negative for dizziness, weakness, numbness and headaches.   Hematological: Negative for adenopathy.   Psychiatric/Behavioral: Negative for confusion and sleep disturbance. The patient is not nervous/anxious.        Past Medical History:   Diagnosis Date    Abnormal Pap smear of cervix     Anticoagulant long-term use     CHF (congestive heart failure)     Encounter for blood transfusion     Heart murmur     atrial fibrillation    Myocardial infarction     Stroke      Past Surgical History:   Procedure Laterality Date    AUGMENTATION OF BREAST      COSMETIC SURGERY      UTERINE ARTERY EMBOLIZATION  2000     Family History   Problem Relation Age of Onset    Ovarian cancer Maternal Aunt 60    Breast cancer Neg Hx     Colon cancer Neg Hx     Uterine cancer Neg Hx      Social History     Socioeconomic History    Marital status:    Tobacco Use    Smoking status: Never Smoker    Smokeless tobacco: Never Used   Substance and Sexual Activity    Alcohol use: Not Currently     Comment: rare    Drug use: No    Sexual activity:  "Yes     Partners: Male     Birth control/protection: None     Current Outpatient Medications   Medication Sig    amiodarone (PACERONE) 200 MG Tab Take 1 tablet (200 mg total) by mouth once daily.    apixaban (ELIQUIS) 2.5 mg Tab Take 1 tablet (2.5 mg total) by mouth 2 (two) times daily.    COVID-19 vacc,mRNA,Moderna,/PF (MODERNA COVID-19 VACCINE, EUA, IM)     furosemide (LASIX) 40 MG tablet Take 1 tablet prn swelling    losartan (COZAAR) 25 MG tablet Take 1 tablet (25 mg total) by mouth once daily.    medroxyPROGESTERone (PROVERA) 10 MG tablet Take 1 tablet (10 mg total) by mouth once daily.     No current facility-administered medications for this visit.     Review of patient's allergies indicates:  No Known Allergies    /62   Pulse (!) 56   Ht 5' 4" (1.626 m)   Wt 76.7 kg (169 lb)   BMI 29.01 kg/m²     Objective:   Physical Exam:   Constitutional: She is oriented to person, place, and time. She appears well-developed and well-nourished. No distress.    HENT:   Head: Normocephalic and atraumatic.    Eyes: No scleral icterus.     Cardiovascular: Exam reveals no cyanosis and no edema.     Pulmonary/Chest: Effort normal. No respiratory distress. She exhibits no tenderness.        Abdominal: Soft. She exhibits no distension, no fluid wave and no mass. There is no abdominal tenderness. There is no rigidity, no rebound and no guarding. No hernia.     Genitourinary:    Vagina normal.      Pelvic exam was performed with patient supine.   There is no rash, tenderness or lesion on the right labia. There is no rash, tenderness or lesion on the left labia. Cervix is normal. Right adnexum displays no mass, no tenderness and no fullness. Left adnexum displays no mass, no tenderness and no fullness. No bleeding in the vagina. Uterus is enlarged and hosting fibroids. Uterus is not tender.           Musculoskeletal: Normal range of motion and moves all extremeties. No edema.      Lymphadenopathy:     She has no " cervical adenopathy.    Neurological: She is alert and oriented to person, place, and time.    Skin: Skin is warm and dry. No rash noted. No cyanosis or erythema. No pallor.    Psychiatric: She has a normal mood and affect. Thought content normal.       Assessment:     1. Fibroids    2. Dysfunctional uterine bleeding    3. 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  She is still receiving hyperbaric therapy for her residual neuro deficits, and the plan is to remain on Eliquis until her cardiac ablation in October then reevaluate 6 months after that.  Definitive treatment would be hysterectomy, but in the interim I encouraged her to take the progesterone.  We discussed ablation, which would not stop her bleeding given the fibroid, but could at least slow it, and we also discussed progesterone IUD, although expulsion rate may be higher because of eher distorted uterine anatomy. She is willing to take the Provera.  Will have her F/U in 3 months at which time will determine timing of hysterectomy.

## 2022-08-05 ENCOUNTER — LAB VISIT (OUTPATIENT)
Dept: LAB | Facility: OTHER | Age: 52
End: 2022-08-05
Attending: INTERNAL MEDICINE
Payer: COMMERCIAL

## 2022-08-05 DIAGNOSIS — I48.91 ATRIAL FIBRILLATION WITH RVR: ICD-10-CM

## 2022-08-05 LAB
BASOPHILS # BLD AUTO: 0.06 K/UL (ref 0–0.2)
BASOPHILS NFR BLD: 0.8 % (ref 0–1.9)
DIFFERENTIAL METHOD: ABNORMAL
EOSINOPHIL # BLD AUTO: 0.2 K/UL (ref 0–0.5)
EOSINOPHIL NFR BLD: 2.3 % (ref 0–8)
ERYTHROCYTE [DISTWIDTH] IN BLOOD BY AUTOMATED COUNT: 14 % (ref 11.5–14.5)
FINAL PATHOLOGIC DIAGNOSIS: NORMAL
HCT VFR BLD AUTO: 28 % (ref 37–48.5)
HGB BLD-MCNC: 9.1 G/DL (ref 12–16)
IMM GRANULOCYTES # BLD AUTO: 0.06 K/UL (ref 0–0.04)
IMM GRANULOCYTES NFR BLD AUTO: 0.8 % (ref 0–0.5)
LYMPHOCYTES # BLD AUTO: 1.2 K/UL (ref 1–4.8)
LYMPHOCYTES NFR BLD: 15.5 % (ref 18–48)
Lab: NORMAL
MCH RBC QN AUTO: 28.6 PG (ref 27–31)
MCHC RBC AUTO-ENTMCNC: 32.5 G/DL (ref 32–36)
MCV RBC AUTO: 88 FL (ref 82–98)
MONOCYTES # BLD AUTO: 0.6 K/UL (ref 0.3–1)
MONOCYTES NFR BLD: 7.8 % (ref 4–15)
NEUTROPHILS # BLD AUTO: 5.6 K/UL (ref 1.8–7.7)
NEUTROPHILS NFR BLD: 72.8 % (ref 38–73)
NRBC BLD-RTO: 0 /100 WBC
PLATELET # BLD AUTO: 373 K/UL (ref 150–450)
PMV BLD AUTO: 9.9 FL (ref 9.2–12.9)
RBC # BLD AUTO: 3.18 M/UL (ref 4–5.4)
WBC # BLD AUTO: 7.74 K/UL (ref 3.9–12.7)

## 2022-08-05 PROCEDURE — 36415 COLL VENOUS BLD VENIPUNCTURE: CPT | Performed by: INTERNAL MEDICINE

## 2022-08-05 PROCEDURE — 85025 COMPLETE CBC W/AUTO DIFF WBC: CPT | Performed by: INTERNAL MEDICINE

## 2022-08-08 ENCOUNTER — OFFICE VISIT (OUTPATIENT)
Dept: GYNECOLOGIC ONCOLOGY | Facility: CLINIC | Age: 52
End: 2022-08-08
Payer: COMMERCIAL

## 2022-08-08 VITALS
SYSTOLIC BLOOD PRESSURE: 131 MMHG | HEART RATE: 56 BPM | DIASTOLIC BLOOD PRESSURE: 62 MMHG | BODY MASS INDEX: 28.85 KG/M2 | HEIGHT: 64 IN | WEIGHT: 169 LBS

## 2022-08-08 DIAGNOSIS — D25.1 INTRAMURAL LEIOMYOMA OF UTERUS: ICD-10-CM

## 2022-08-08 DIAGNOSIS — Z79.01 CURRENT USE OF LONG TERM ANTICOAGULATION: ICD-10-CM

## 2022-08-08 DIAGNOSIS — D21.9 FIBROIDS: Primary | ICD-10-CM

## 2022-08-08 DIAGNOSIS — N93.8 DYSFUNCTIONAL UTERINE BLEEDING: ICD-10-CM

## 2022-08-08 DIAGNOSIS — I48.91 ATRIAL FIBRILLATION WITH RVR: ICD-10-CM

## 2022-08-08 PROCEDURE — 99205 PR OFFICE/OUTPT VISIT, NEW, LEVL V, 60-74 MIN: ICD-10-PCS | Mod: S$GLB,,, | Performed by: OBSTETRICS & GYNECOLOGY

## 2022-08-08 PROCEDURE — 4010F ACE/ARB THERAPY RXD/TAKEN: CPT | Mod: CPTII,S$GLB,, | Performed by: OBSTETRICS & GYNECOLOGY

## 2022-08-08 PROCEDURE — 99999 PR PBB SHADOW E&M-EST. PATIENT-LVL III: CPT | Mod: PBBFAC,,, | Performed by: OBSTETRICS & GYNECOLOGY

## 2022-08-08 PROCEDURE — 99205 OFFICE O/P NEW HI 60 MIN: CPT | Mod: S$GLB,,, | Performed by: OBSTETRICS & GYNECOLOGY

## 2022-08-08 PROCEDURE — 1159F PR MEDICATION LIST DOCUMENTED IN MEDICAL RECORD: ICD-10-PCS | Mod: CPTII,S$GLB,, | Performed by: OBSTETRICS & GYNECOLOGY

## 2022-08-08 PROCEDURE — 3075F SYST BP GE 130 - 139MM HG: CPT | Mod: CPTII,S$GLB,, | Performed by: OBSTETRICS & GYNECOLOGY

## 2022-08-08 PROCEDURE — 99999 PR PBB SHADOW E&M-EST. PATIENT-LVL III: ICD-10-PCS | Mod: PBBFAC,,, | Performed by: OBSTETRICS & GYNECOLOGY

## 2022-08-08 PROCEDURE — 3008F PR BODY MASS INDEX (BMI) DOCUMENTED: ICD-10-PCS | Mod: CPTII,S$GLB,, | Performed by: OBSTETRICS & GYNECOLOGY

## 2022-08-08 PROCEDURE — 1160F PR REVIEW ALL MEDS BY PRESCRIBER/CLIN PHARMACIST DOCUMENTED: ICD-10-PCS | Mod: CPTII,S$GLB,, | Performed by: OBSTETRICS & GYNECOLOGY

## 2022-08-08 PROCEDURE — 3078F DIAST BP <80 MM HG: CPT | Mod: CPTII,S$GLB,, | Performed by: OBSTETRICS & GYNECOLOGY

## 2022-08-08 PROCEDURE — 3008F BODY MASS INDEX DOCD: CPT | Mod: CPTII,S$GLB,, | Performed by: OBSTETRICS & GYNECOLOGY

## 2022-08-08 PROCEDURE — 1160F RVW MEDS BY RX/DR IN RCRD: CPT | Mod: CPTII,S$GLB,, | Performed by: OBSTETRICS & GYNECOLOGY

## 2022-08-08 PROCEDURE — 4010F PR ACE/ARB THEARPY RXD/TAKEN: ICD-10-PCS | Mod: CPTII,S$GLB,, | Performed by: OBSTETRICS & GYNECOLOGY

## 2022-08-08 PROCEDURE — 3078F PR MOST RECENT DIASTOLIC BLOOD PRESSURE < 80 MM HG: ICD-10-PCS | Mod: CPTII,S$GLB,, | Performed by: OBSTETRICS & GYNECOLOGY

## 2022-08-08 PROCEDURE — 1159F MED LIST DOCD IN RCRD: CPT | Mod: CPTII,S$GLB,, | Performed by: OBSTETRICS & GYNECOLOGY

## 2022-08-08 PROCEDURE — 3075F PR MOST RECENT SYSTOLIC BLOOD PRESS GE 130-139MM HG: ICD-10-PCS | Mod: CPTII,S$GLB,, | Performed by: OBSTETRICS & GYNECOLOGY

## 2022-08-11 ENCOUNTER — PATIENT MESSAGE (OUTPATIENT)
Dept: OBSTETRICS AND GYNECOLOGY | Facility: CLINIC | Age: 52
End: 2022-08-11
Payer: COMMERCIAL

## 2022-08-12 ENCOUNTER — PATIENT MESSAGE (OUTPATIENT)
Dept: ELECTROPHYSIOLOGY | Facility: CLINIC | Age: 52
End: 2022-08-12
Payer: COMMERCIAL

## 2022-08-15 NOTE — TELEPHONE ENCOUNTER
Can you please clarify her Eliquis dose? She was originally put on 5mg bid. Her [id number] is 3 (prior CVA, female gender, age less than 64). However, she presented to ER with vaginal bleeding and rec'd 2u PRBC's. She was sent home on Eliquis 2.5mg bid.   For some reason the pharmacy filled her 5mg rx. Just want to clarify the dose.   thanks

## 2022-08-17 ENCOUNTER — PATIENT MESSAGE (OUTPATIENT)
Dept: ELECTROPHYSIOLOGY | Facility: CLINIC | Age: 52
End: 2022-08-17
Payer: COMMERCIAL

## 2022-09-07 ENCOUNTER — TELEPHONE (OUTPATIENT)
Dept: ELECTROPHYSIOLOGY | Facility: CLINIC | Age: 52
End: 2022-09-07
Payer: COMMERCIAL

## 2022-09-12 ENCOUNTER — TELEPHONE (OUTPATIENT)
Dept: ELECTROPHYSIOLOGY | Facility: CLINIC | Age: 52
End: 2022-09-12
Payer: COMMERCIAL

## 2022-09-12 ENCOUNTER — PATIENT MESSAGE (OUTPATIENT)
Dept: ELECTROPHYSIOLOGY | Facility: CLINIC | Age: 52
End: 2022-09-12
Payer: COMMERCIAL

## 2022-09-12 NOTE — TELEPHONE ENCOUNTER
----- Message from Nelida Ortiz sent at 9/12/2022 11:28 AM CDT -----  Regarding: Missed Call  Pt [phone] returning missed call from last week.    Thanks

## 2022-09-14 ENCOUNTER — PATIENT MESSAGE (OUTPATIENT)
Dept: ELECTROPHYSIOLOGY | Facility: CLINIC | Age: 52
End: 2022-09-14
Payer: COMMERCIAL

## 2022-09-14 DIAGNOSIS — I49.8 OTHER SPECIFIED CARDIAC ARRHYTHMIAS: ICD-10-CM

## 2022-09-14 DIAGNOSIS — I48.0 PAROXYSMAL ATRIAL FIBRILLATION: Primary | ICD-10-CM

## 2022-09-20 ENCOUNTER — PATIENT MESSAGE (OUTPATIENT)
Dept: NEUROLOGY | Facility: CLINIC | Age: 52
End: 2022-09-20

## 2022-09-20 ENCOUNTER — OFFICE VISIT (OUTPATIENT)
Dept: NEUROLOGY | Facility: CLINIC | Age: 52
End: 2022-09-20
Payer: COMMERCIAL

## 2022-09-20 ENCOUNTER — LAB VISIT (OUTPATIENT)
Dept: LAB | Facility: HOSPITAL | Age: 52
End: 2022-09-20
Attending: STUDENT IN AN ORGANIZED HEALTH CARE EDUCATION/TRAINING PROGRAM
Payer: COMMERCIAL

## 2022-09-20 VITALS
SYSTOLIC BLOOD PRESSURE: 131 MMHG | WEIGHT: 169.75 LBS | DIASTOLIC BLOOD PRESSURE: 74 MMHG | HEIGHT: 64 IN | BODY MASS INDEX: 28.98 KG/M2 | HEART RATE: 50 BPM

## 2022-09-20 DIAGNOSIS — K57.92 DIVERTICULITIS: ICD-10-CM

## 2022-09-20 DIAGNOSIS — I49.8 OTHER SPECIFIED CARDIAC ARRHYTHMIAS: ICD-10-CM

## 2022-09-20 DIAGNOSIS — I48.0 PAROXYSMAL ATRIAL FIBRILLATION: ICD-10-CM

## 2022-09-20 DIAGNOSIS — I48.91 ATRIAL FIBRILLATION WITH RVR: ICD-10-CM

## 2022-09-20 DIAGNOSIS — Z86.73 HISTORY OF CEREBROVASCULAR ACCIDENT (CVA) FROM LEFT CAROTID ARTERY OCCLUSION INVOLVING LEFT MIDDLE CEREBRAL ARTERY TERRITORY: Primary | ICD-10-CM

## 2022-09-20 PROBLEM — I63.9 CEREBROVASCULAR ACCIDENT (CVA) DUE TO EMBOLISM: Status: RESOLVED | Noted: 2022-04-23 | Resolved: 2022-09-20

## 2022-09-20 LAB
ANION GAP SERPL CALC-SCNC: 10 MMOL/L (ref 8–16)
APTT BLDCRRT: 24.6 SEC (ref 21–32)
BASOPHILS # BLD AUTO: 0.03 K/UL (ref 0–0.2)
BASOPHILS NFR BLD: 0.5 % (ref 0–1.9)
BUN SERPL-MCNC: 17 MG/DL (ref 6–20)
CALCIUM SERPL-MCNC: 9.4 MG/DL (ref 8.7–10.5)
CHLORIDE SERPL-SCNC: 106 MMOL/L (ref 95–110)
CO2 SERPL-SCNC: 22 MMOL/L (ref 23–29)
CREAT SERPL-MCNC: 0.9 MG/DL (ref 0.5–1.4)
DIFFERENTIAL METHOD: ABNORMAL
EOSINOPHIL # BLD AUTO: 0.1 K/UL (ref 0–0.5)
EOSINOPHIL NFR BLD: 2.2 % (ref 0–8)
ERYTHROCYTE [DISTWIDTH] IN BLOOD BY AUTOMATED COUNT: 15.4 % (ref 11.5–14.5)
EST. GFR  (NO RACE VARIABLE): >60 ML/MIN/1.73 M^2
GLUCOSE SERPL-MCNC: 92 MG/DL (ref 70–110)
HCT VFR BLD AUTO: 32.9 % (ref 37–48.5)
HGB BLD-MCNC: 9.8 G/DL (ref 12–16)
IMM GRANULOCYTES # BLD AUTO: 0.03 K/UL (ref 0–0.04)
IMM GRANULOCYTES NFR BLD AUTO: 0.5 % (ref 0–0.5)
INR PPP: 1 (ref 0.8–1.2)
LYMPHOCYTES # BLD AUTO: 1.6 K/UL (ref 1–4.8)
LYMPHOCYTES NFR BLD: 26.4 % (ref 18–48)
MCH RBC QN AUTO: 23.7 PG (ref 27–31)
MCHC RBC AUTO-ENTMCNC: 29.8 G/DL (ref 32–36)
MCV RBC AUTO: 80 FL (ref 82–98)
MONOCYTES # BLD AUTO: 0.6 K/UL (ref 0.3–1)
MONOCYTES NFR BLD: 9.5 % (ref 4–15)
NEUTROPHILS # BLD AUTO: 3.6 K/UL (ref 1.8–7.7)
NEUTROPHILS NFR BLD: 60.9 % (ref 38–73)
NRBC BLD-RTO: 0 /100 WBC
PLATELET # BLD AUTO: 371 K/UL (ref 150–450)
PMV BLD AUTO: 10.9 FL (ref 9.2–12.9)
POTASSIUM SERPL-SCNC: 4.6 MMOL/L (ref 3.5–5.1)
PROTHROMBIN TIME: 10.3 SEC (ref 9–12.5)
RBC # BLD AUTO: 4.13 M/UL (ref 4–5.4)
SODIUM SERPL-SCNC: 138 MMOL/L (ref 136–145)
WBC # BLD AUTO: 5.98 K/UL (ref 3.9–12.7)

## 2022-09-20 PROCEDURE — 3044F PR MOST RECENT HEMOGLOBIN A1C LEVEL <7.0%: ICD-10-PCS | Mod: CPTII,S$GLB,, | Performed by: PSYCHIATRY & NEUROLOGY

## 2022-09-20 PROCEDURE — 85730 THROMBOPLASTIN TIME PARTIAL: CPT | Performed by: STUDENT IN AN ORGANIZED HEALTH CARE EDUCATION/TRAINING PROGRAM

## 2022-09-20 PROCEDURE — 99999 PR PBB SHADOW E&M-EST. PATIENT-LVL III: ICD-10-PCS | Mod: PBBFAC,,, | Performed by: PSYCHIATRY & NEUROLOGY

## 2022-09-20 PROCEDURE — 99215 PR OFFICE/OUTPT VISIT, EST, LEVL V, 40-54 MIN: ICD-10-PCS | Mod: S$GLB,,, | Performed by: PSYCHIATRY & NEUROLOGY

## 2022-09-20 PROCEDURE — 3075F PR MOST RECENT SYSTOLIC BLOOD PRESS GE 130-139MM HG: ICD-10-PCS | Mod: CPTII,S$GLB,, | Performed by: PSYCHIATRY & NEUROLOGY

## 2022-09-20 PROCEDURE — 85610 PROTHROMBIN TIME: CPT | Performed by: STUDENT IN AN ORGANIZED HEALTH CARE EDUCATION/TRAINING PROGRAM

## 2022-09-20 PROCEDURE — 3008F PR BODY MASS INDEX (BMI) DOCUMENTED: ICD-10-PCS | Mod: CPTII,S$GLB,, | Performed by: PSYCHIATRY & NEUROLOGY

## 2022-09-20 PROCEDURE — 4010F PR ACE/ARB THEARPY RXD/TAKEN: ICD-10-PCS | Mod: CPTII,S$GLB,, | Performed by: PSYCHIATRY & NEUROLOGY

## 2022-09-20 PROCEDURE — 3078F DIAST BP <80 MM HG: CPT | Mod: CPTII,S$GLB,, | Performed by: PSYCHIATRY & NEUROLOGY

## 2022-09-20 PROCEDURE — 1159F MED LIST DOCD IN RCRD: CPT | Mod: CPTII,S$GLB,, | Performed by: PSYCHIATRY & NEUROLOGY

## 2022-09-20 PROCEDURE — 1159F PR MEDICATION LIST DOCUMENTED IN MEDICAL RECORD: ICD-10-PCS | Mod: CPTII,S$GLB,, | Performed by: PSYCHIATRY & NEUROLOGY

## 2022-09-20 PROCEDURE — 1160F RVW MEDS BY RX/DR IN RCRD: CPT | Mod: CPTII,S$GLB,, | Performed by: PSYCHIATRY & NEUROLOGY

## 2022-09-20 PROCEDURE — 85025 COMPLETE CBC W/AUTO DIFF WBC: CPT | Performed by: STUDENT IN AN ORGANIZED HEALTH CARE EDUCATION/TRAINING PROGRAM

## 2022-09-20 PROCEDURE — 3044F HG A1C LEVEL LT 7.0%: CPT | Mod: CPTII,S$GLB,, | Performed by: PSYCHIATRY & NEUROLOGY

## 2022-09-20 PROCEDURE — 3078F PR MOST RECENT DIASTOLIC BLOOD PRESSURE < 80 MM HG: ICD-10-PCS | Mod: CPTII,S$GLB,, | Performed by: PSYCHIATRY & NEUROLOGY

## 2022-09-20 PROCEDURE — 99215 OFFICE O/P EST HI 40 MIN: CPT | Mod: S$GLB,,, | Performed by: PSYCHIATRY & NEUROLOGY

## 2022-09-20 PROCEDURE — 3008F BODY MASS INDEX DOCD: CPT | Mod: CPTII,S$GLB,, | Performed by: PSYCHIATRY & NEUROLOGY

## 2022-09-20 PROCEDURE — 80048 BASIC METABOLIC PNL TOTAL CA: CPT | Performed by: STUDENT IN AN ORGANIZED HEALTH CARE EDUCATION/TRAINING PROGRAM

## 2022-09-20 PROCEDURE — 1160F PR REVIEW ALL MEDS BY PRESCRIBER/CLIN PHARMACIST DOCUMENTED: ICD-10-PCS | Mod: CPTII,S$GLB,, | Performed by: PSYCHIATRY & NEUROLOGY

## 2022-09-20 PROCEDURE — 4010F ACE/ARB THERAPY RXD/TAKEN: CPT | Mod: CPTII,S$GLB,, | Performed by: PSYCHIATRY & NEUROLOGY

## 2022-09-20 PROCEDURE — 3075F SYST BP GE 130 - 139MM HG: CPT | Mod: CPTII,S$GLB,, | Performed by: PSYCHIATRY & NEUROLOGY

## 2022-09-20 PROCEDURE — 99999 PR PBB SHADOW E&M-EST. PATIENT-LVL III: CPT | Mod: PBBFAC,,, | Performed by: PSYCHIATRY & NEUROLOGY

## 2022-09-20 RX ORDER — ATORVASTATIN CALCIUM 40 MG/1
40 TABLET, FILM COATED ORAL DAILY
Qty: 90 TABLET | Refills: 1 | Status: SHIPPED | OUTPATIENT
Start: 2022-09-20 | End: 2022-10-20

## 2022-09-20 NOTE — PROGRESS NOTES
"Outpatient Vascular Neurology Consultation    Requesting physician: Dr. Noyola    Impression:  History of L MCA ischemic stroke (4/7/22): cardioembolic due to AF  Post-stroke anxiety    Stroke risk factors: stroke, AF, CAD, CM with improved EF (unknown lipid status)    Plan:  Continue apixaban 2.5mg bid for now  Ablation and hysterectomy as planned  Will need full dose apixaban after hysterectomy   FLP, HgA1c  Psychology referral  Music therapy referral  RTC 6 months or sooner, prn      Problem List Items Addressed This Visit          1 - High    History of cerebrovascular accident (CVA) from left carotid artery occlusion involving left middle cerebral artery territory - Primary    Relevant Orders    LIPID PANEL (Completed)    Hemoglobin A1C (Completed)       Unprioritized    Atrial fibrillation with RVR    Diverticulitis       CC:  stroke    HPI:  53 y/o WF referred for stroke f/u.  She has seen Sampson Miguel and Marcie.  Only residua are mild dysfluency and anxiety.     History per Dr. Miguel's note:  "Ms. Maria Dolores Tamayo is a 52 y.o. female with history of atrial fibrillation who presented to outside ED on April 7, 2022 while visiting (flying to) [place]. Patient was brought in at approximately 12:10 p.m. with new onset of right-sided paralysis and aphasia. Episode occurred while she was flying in her private jet when she lost ability to speak and had right-sided weakness at that time. She was brought to emergency landing at the 1st reports they came across a with EMS awaiting for her. CT head did not show any acute intracranial abnormalities. CTA head and neck with of negative study an aspects score 10/10. She was treated with tPA and admitted to the hospitalist service for evaluation of stroke with neurology consult. She was diagnosed with left MCA stroke with right-sided weakness. Repeat CT head was negative for any bleed. Her course was complicated with of V-tach arrest on April 9, 2022 for which she received multiple " "amps of epinephrine and required 3 different relations, and was given amiodarone bolus. She was intubated and transferred back to the ICU. She was determined to have prolonged QT it was in torsades. TTE showed ejection fraction 25%. She was treated with cardiac catheterization on April 13, 2022 with findings of severely reduced left ventricular ejection fraction of approximately 15-20% with severe global hypokinesis and nonischemic cardiomyopathy. She was treated with dual chamber left-sided intracardiac device implantation; however, the patient was not able to tolerate the procedure. Cardiology then discussed placement of LifeVest. Neurology recommended treatment with aspirin and Plavix but was later switched to apixaban on April 15, 2022. Course was complicated by respiratory cultures positive for Moraxella catarrhalis for which she was treated with 2 weeks of Unasyn.  Patient was deemed medically stable and transferred to [facility] to participate in acute inpatient rehabilitation on 4/19/2022."      Since she was discharged from the hospital, she has completed inpatient rehabilitation with significant improvement in her right hemiparesis and speech. She denies new, worsening, or recurrent symptoms since discharge. She walked unassisted with no recent falls. She has driven locally without problem. She continues to take eliquis 5mg BID, no missed doses. She was initially on a statin at the OSH, but this was discontinued due to rising transaminases. She is working with cardiology with consideration of a possible cardiac ablation. Her ejection fraction has improved since her admission and she no longer wears a LifeVest. She is working on obtaining speech therapy close to her home in [place].       Etiology: Left MCA territory infarction, s/p tPA, no LVO amenable for EVT, etiology cardioembolic 2/2 atrial fibrillation with RVR, nonischemic cardiomyopathy with reduced EF 15-20%  Intervention: IV " tPA, no LVO per records amenable for EVT    Work-up:  Imaging: No intracranial imaging available for review at this time. Patient will obtain imaging on discs from the OSH in [place] and present them to clinic for review.     Cardiac Evaluation:  - TTE (4/22/2022): The left ventricle is normal in size with eccentric hypertrophy and normal systolic function. The estimated ejection fraction is 55%. Normal right ventricular size with normal right ventricular systolic function. Mild left atrial enlargement. Normal central venous pressure (3 mmHg). Atrial fibrillation present throughout the examination.    Labs:  No results found for: LABA1C, HGBA1C    No results found for: LDLCALC    AF diagnosed 10 yrs ago    Echo 4/23/22 - EF 55%, mild LAE, AF  Ablation planned Oct 5th      She feels hyperbarics are helping recovery.   No outpatient speech therapy  She had significant menorrhagia requiring transfusion (fibroids).  Apixaban restarted at reduced dosage (2.5mg bid).  Hysterectomy planned for Nov.    Past Medical History:   Diagnosis Date    Abnormal Pap smear of cervix     Anticoagulant long-term use     CHF (congestive heart failure)     Encounter for blood transfusion     Heart murmur     atrial fibrillation    Myocardial infarction     Stroke       Past Surgical History:   Procedure Laterality Date    AUGMENTATION OF BREAST      COSMETIC SURGERY      UTERINE ARTERY EMBOLIZATION  2000      Outpatient Medications Marked as Taking for the 9/20/22 encounter (Office Visit) with Cortez Owusu MD   Medication Sig Dispense Refill    amiodarone (PACERONE) 200 MG Tab Take 1 tablet (200 mg total) by mouth once daily. 30 tablet 11    apixaban (ELIQUIS) 2.5 mg Tab Take 1 tablet (2.5 mg total) by mouth 2 (two) times daily. 60 tablet 11    COVID-19 vacc,mRNA,Moderna,/PF (MODERNA COVID-19 VACCINE, EUA, IM)       furosemide (LASIX) 40 MG tablet Take 1 tablet prn swelling 7 tablet 0    losartan (COZAAR) 25 MG tablet Take 1  "tablet (25 mg total) by mouth once daily. 90 tablet 3    medroxyPROGESTERone (PROVERA) 10 MG tablet TAKE 1 TABLET BY MOUTH EVERY DAY 30 tablet 0      Review of patient's allergies indicates:  No Known Allergies   Family History   Problem Relation Age of Onset    Ovarian cancer Maternal Aunt 60    Breast cancer Neg Hx     Colon cancer Neg Hx     Uterine cancer Neg Hx       Social History     Socioeconomic History    Marital status:    Tobacco Use    Smoking status: Never    Smokeless tobacco: Never   Substance and Sexual Activity    Alcohol use: Not Currently     Comment: rare    Drug use: No    Sexual activity: Yes     Partners: Male     Birth control/protection: None     Review Of Systems:  General: Negative for fever   HENT: Negative for tinnitus, nose bleeds, neck stiffness   Cardiac Negative for palpitations   Vascular: Negative for easy bruising   Pulmonary: Negative for cough   Gastrointestinal: Negative for constipation   Urinary: Negative for incomplete bladder emptying   Musculoskeletal: Negative for muscle aches   Neurological: As above. Otherwise negative for abnormal movements   Psychiatric:  +anxiety     /74 (BP Location: Right arm, Patient Position: Sitting, BP Method: Large (Automatic))   Pulse (!) 50   Ht 5' 4" (1.626 m)   Wt 77 kg (169 lb 12.1 oz)   BMI 29.14 kg/m²    Well developed, well nourished female  Extremities: no edema    Mental status:   Awake, alert and appropriately oriented   Normal recent and remote memory   Normal attention and concentration   Normal speech and language except mild dysfluency   Normal fund of knowledge   No extinction  Cranial nerves:   Normal funduscopic - discs sharp   PERRLA   EOMF without nystagmus   VFF   Normal facial sensation   Normal facial movements   Intact hearing bilaterally   Palate elevates symmetrically   Normal SCM and trapezius strength   Tongue midline  Motor:   No pronator drift   Normal FF movements bilaterally   Normal muscle " tone, bulk and power except trace R deltoid/IP weakness   No abnormal movements  Sensory   Intact to LT, temperature  DTRs   NT  Coordination   Intact to FNF and HTS  Gait   Normal base and gait    NIHSS = 1 (aphasia)  mRS = 1    Data Reviewed:    Lab Results   Component Value Date    LDLCALC 110.8 09/20/2022     Lab Results   Component Value Date    HGBA1C 5.0 09/20/2022         Cortez Owusu MD

## 2022-09-22 ENCOUNTER — TELEPHONE (OUTPATIENT)
Dept: NEUROLOGY | Facility: CLINIC | Age: 52
End: 2022-09-22
Payer: COMMERCIAL

## 2022-09-22 NOTE — TELEPHONE ENCOUNTER
----- Message from Eden Araujo RN sent at 9/21/2022  3:17 PM CDT -----  Regarding: RE: Music Therapy  Teresa says you should have access to schedule? Are you scheduling for [facility]?  ----- Message -----  From: Skylar Ramírez MA  Sent: 9/21/2022   3:06 PM CDT  To: Eden Araujo RN  Subject: FW: Music Therapy                                Kristian Hein,     I am not sure what is the process for time scheduling in Music Therapy. Do I just schedule or am I to forward the message to [facility]?    Skylar        ----- Message -----  From: Princess Klein MA  Sent: 9/21/2022   1:03 PM CDT  To: Skylar Ramírez MA  Subject: FW: Music Therapy                                  ----- Message -----  From: Yola Centeno MA  Sent: 9/20/2022   3:41 PM CDT  To: Princess Klein MA  Subject: Music Therapy                                    Hi Dr. Francoise Sánchez's referring this patient for music therapy. Please assist with scheduling...      Thank you so much!  Yola

## 2022-10-04 ENCOUNTER — ANESTHESIA EVENT (OUTPATIENT)
Dept: MEDSURG UNIT | Facility: HOSPITAL | Age: 52
End: 2022-10-04
Payer: COMMERCIAL

## 2022-10-04 ENCOUNTER — TELEPHONE (OUTPATIENT)
Dept: ELECTROPHYSIOLOGY | Facility: CLINIC | Age: 52
End: 2022-10-04
Payer: COMMERCIAL

## 2022-10-04 NOTE — TELEPHONE ENCOUNTER
Spoke to Ms. Tamayo    CONFIRMED procedure arrival time of 530    Reiterated instructions including:  -Directions to check in desk  -NPO after midnight night prior to procedure  -High importance of HOLDING Eliquis am of procedure, Amio after 9/30  -Pre-procedure LABS done, no alerts  -COVID test n/a, vaccinated  -Confirmed absence or presence of implanted device/stimulator, patient confirms, none present  -Confirmed no fever, cough, or shortness of breath in the past 30 days  -Confirmed no redness, rash, irritation, or yeast infection to groin area.     Patient verbalized understanding of above and appreciated the call.

## 2022-10-05 ENCOUNTER — ANESTHESIA (OUTPATIENT)
Dept: MEDSURG UNIT | Facility: HOSPITAL | Age: 52
End: 2022-10-05
Payer: COMMERCIAL

## 2022-10-05 ENCOUNTER — HOSPITAL ENCOUNTER (OUTPATIENT)
Facility: HOSPITAL | Age: 52
Discharge: HOME | End: 2022-10-05
Attending: STUDENT IN AN ORGANIZED HEALTH CARE EDUCATION/TRAINING PROGRAM | Admitting: STUDENT IN AN ORGANIZED HEALTH CARE EDUCATION/TRAINING PROGRAM
Payer: COMMERCIAL

## 2022-10-05 VITALS
SYSTOLIC BLOOD PRESSURE: 105 MMHG | BODY MASS INDEX: 28 KG/M2 | RESPIRATION RATE: 18 BRPM | TEMPERATURE: 98 F | OXYGEN SATURATION: 99 % | WEIGHT: 164 LBS | HEIGHT: 64 IN | HEART RATE: 60 BPM | DIASTOLIC BLOOD PRESSURE: 53 MMHG

## 2022-10-05 DIAGNOSIS — I49.8 OTHER SPECIFIED CARDIAC ARRHYTHMIAS: ICD-10-CM

## 2022-10-05 DIAGNOSIS — I49.9 ARRHYTHMIA: ICD-10-CM

## 2022-10-05 DIAGNOSIS — I48.0 PAROXYSMAL ATRIAL FIBRILLATION: ICD-10-CM

## 2022-10-05 DIAGNOSIS — Z86.79 STATUS POST ABLATION OF ATRIAL FIBRILLATION: ICD-10-CM

## 2022-10-05 DIAGNOSIS — I48.19 PERSISTENT ATRIAL FIBRILLATION: ICD-10-CM

## 2022-10-05 DIAGNOSIS — Z98.890 STATUS POST ABLATION OF ATRIAL FIBRILLATION: ICD-10-CM

## 2022-10-05 LAB
B-HCG UR QL: NEGATIVE
CTP QC/QA: YES
POC ACTIVATED CLOTTING TIME K: 126 SEC (ref 74–137)
POC ACTIVATED CLOTTING TIME K: 127 SEC (ref 74–137)
POC ACTIVATED CLOTTING TIME K: 323 SEC (ref 74–137)
POC ACTIVATED CLOTTING TIME K: 329 SEC (ref 74–137)
POC ACTIVATED CLOTTING TIME K: 335 SEC (ref 74–137)
POC ACTIVATED CLOTTING TIME K: 352 SEC (ref 74–137)
POC ACTIVATED CLOTTING TIME K: 381 SEC (ref 74–137)
POC ACTIVATED CLOTTING TIME K: 382 SEC (ref 74–137)
SAMPLE: ABNORMAL
SAMPLE: NORMAL
SAMPLE: NORMAL

## 2022-10-05 PROCEDURE — 25000003 PHARM REV CODE 250: Performed by: NURSE ANESTHETIST, CERTIFIED REGISTERED

## 2022-10-05 PROCEDURE — C1769 GUIDE WIRE: HCPCS | Performed by: STUDENT IN AN ORGANIZED HEALTH CARE EDUCATION/TRAINING PROGRAM

## 2022-10-05 PROCEDURE — 36620 INSERTION CATHETER ARTERY: CPT | Mod: 59,,, | Performed by: ANESTHESIOLOGY

## 2022-10-05 PROCEDURE — D9220A PRA ANESTHESIA: Mod: ANES,,, | Performed by: ANESTHESIOLOGY

## 2022-10-05 PROCEDURE — 37000009 HC ANESTHESIA EA ADD 15 MINS: Performed by: STUDENT IN AN ORGANIZED HEALTH CARE EDUCATION/TRAINING PROGRAM

## 2022-10-05 PROCEDURE — 37000008 HC ANESTHESIA 1ST 15 MINUTES: Performed by: STUDENT IN AN ORGANIZED HEALTH CARE EDUCATION/TRAINING PROGRAM

## 2022-10-05 PROCEDURE — 93005 ELECTROCARDIOGRAM TRACING: CPT | Mod: 59

## 2022-10-05 PROCEDURE — C1731 CATH, EP, 20 OR MORE ELEC: HCPCS | Performed by: STUDENT IN AN ORGANIZED HEALTH CARE EDUCATION/TRAINING PROGRAM

## 2022-10-05 PROCEDURE — 63600175 PHARM REV CODE 636 W HCPCS: Performed by: NURSE ANESTHETIST, CERTIFIED REGISTERED

## 2022-10-05 PROCEDURE — C1730 CATH, EP, 19 OR FEW ELECT: HCPCS | Performed by: STUDENT IN AN ORGANIZED HEALTH CARE EDUCATION/TRAINING PROGRAM

## 2022-10-05 PROCEDURE — 93010 ELECTROCARDIOGRAM REPORT: CPT | Mod: 76,,, | Performed by: INTERNAL MEDICINE

## 2022-10-05 PROCEDURE — 27201423 OPTIME MED/SURG SUP & DEVICES STERILE SUPPLY: Performed by: STUDENT IN AN ORGANIZED HEALTH CARE EDUCATION/TRAINING PROGRAM

## 2022-10-05 PROCEDURE — C1732 CATH, EP, DIAG/ABL, 3D/VECT: HCPCS | Performed by: STUDENT IN AN ORGANIZED HEALTH CARE EDUCATION/TRAINING PROGRAM

## 2022-10-05 PROCEDURE — 93010 EKG 12-LEAD: ICD-10-PCS | Mod: 76,,, | Performed by: INTERNAL MEDICINE

## 2022-10-05 PROCEDURE — 36620 PR INSERT CATH,ART,PERCUT,SHORTTERM: ICD-10-PCS | Mod: 59,,, | Performed by: ANESTHESIOLOGY

## 2022-10-05 PROCEDURE — 93656 COMPRE EP EVAL ABLTJ ATR FIB: CPT | Mod: ,,, | Performed by: STUDENT IN AN ORGANIZED HEALTH CARE EDUCATION/TRAINING PROGRAM

## 2022-10-05 PROCEDURE — 93656 COMPRE EP EVAL ABLTJ ATR FIB: CPT | Performed by: STUDENT IN AN ORGANIZED HEALTH CARE EDUCATION/TRAINING PROGRAM

## 2022-10-05 PROCEDURE — 63600175 PHARM REV CODE 636 W HCPCS: Performed by: STUDENT IN AN ORGANIZED HEALTH CARE EDUCATION/TRAINING PROGRAM

## 2022-10-05 PROCEDURE — 27201037 HC PRESSURE MONITORING SET UP

## 2022-10-05 PROCEDURE — 25000003 PHARM REV CODE 250: Performed by: STUDENT IN AN ORGANIZED HEALTH CARE EDUCATION/TRAINING PROGRAM

## 2022-10-05 PROCEDURE — 81025 URINE PREGNANCY TEST: CPT | Performed by: STUDENT IN AN ORGANIZED HEALTH CARE EDUCATION/TRAINING PROGRAM

## 2022-10-05 PROCEDURE — D9220A PRA ANESTHESIA: ICD-10-PCS | Mod: CRNA,,, | Performed by: NURSE ANESTHETIST, CERTIFIED REGISTERED

## 2022-10-05 PROCEDURE — 93005 ELECTROCARDIOGRAM TRACING: CPT

## 2022-10-05 PROCEDURE — 93010 ELECTROCARDIOGRAM REPORT: CPT | Mod: ,,, | Performed by: INTERNAL MEDICINE

## 2022-10-05 PROCEDURE — 93656 PR ELECTROPHYS EVAL, COMPREHEN, W/ABLATION OF ATRIAL FIBR: ICD-10-PCS | Mod: ,,, | Performed by: STUDENT IN AN ORGANIZED HEALTH CARE EDUCATION/TRAINING PROGRAM

## 2022-10-05 PROCEDURE — C1894 INTRO/SHEATH, NON-LASER: HCPCS | Performed by: STUDENT IN AN ORGANIZED HEALTH CARE EDUCATION/TRAINING PROGRAM

## 2022-10-05 PROCEDURE — D9220A PRA ANESTHESIA: ICD-10-PCS | Mod: ANES,,, | Performed by: ANESTHESIOLOGY

## 2022-10-05 PROCEDURE — D9220A PRA ANESTHESIA: Mod: CRNA,,, | Performed by: NURSE ANESTHETIST, CERTIFIED REGISTERED

## 2022-10-05 PROCEDURE — C1759 CATH, INTRA ECHOCARDIOGRAPHY: HCPCS | Performed by: STUDENT IN AN ORGANIZED HEALTH CARE EDUCATION/TRAINING PROGRAM

## 2022-10-05 RX ORDER — HEPARIN SODIUM 1000 [USP'U]/ML
INJECTION, SOLUTION INTRAVENOUS; SUBCUTANEOUS
Status: DISCONTINUED | OUTPATIENT
Start: 2022-10-05 | End: 2022-10-05

## 2022-10-05 RX ORDER — KETAMINE HCL IN 0.9 % NACL 50 MG/5 ML
SYRINGE (ML) INTRAVENOUS
Status: DISCONTINUED | OUTPATIENT
Start: 2022-10-05 | End: 2022-10-05

## 2022-10-05 RX ORDER — HYDROMORPHONE HYDROCHLORIDE 1 MG/ML
0.2 INJECTION, SOLUTION INTRAMUSCULAR; INTRAVENOUS; SUBCUTANEOUS EVERY 5 MIN PRN
Status: DISCONTINUED | OUTPATIENT
Start: 2022-10-05 | End: 2022-10-05 | Stop reason: HOSPADM

## 2022-10-05 RX ORDER — PROTAMINE SULFATE 10 MG/ML
INJECTION, SOLUTION INTRAVENOUS
Status: DISCONTINUED | OUTPATIENT
Start: 2022-10-05 | End: 2022-10-05

## 2022-10-05 RX ORDER — HEPARIN SODIUM 10000 [USP'U]/100ML
INJECTION, SOLUTION INTRAVENOUS CONTINUOUS PRN
Status: DISCONTINUED | OUTPATIENT
Start: 2022-10-05 | End: 2022-10-05

## 2022-10-05 RX ORDER — FUROSEMIDE 10 MG/ML
INJECTION INTRAMUSCULAR; INTRAVENOUS
Status: DISCONTINUED | OUTPATIENT
Start: 2022-10-05 | End: 2022-10-05

## 2022-10-05 RX ORDER — PROPOFOL 10 MG/ML
VIAL (ML) INTRAVENOUS
Status: DISCONTINUED | OUTPATIENT
Start: 2022-10-05 | End: 2022-10-05

## 2022-10-05 RX ORDER — LIDOCAINE HYDROCHLORIDE 10 MG/ML
INJECTION INFILTRATION; PERINEURAL
Status: DISCONTINUED | OUTPATIENT
Start: 2022-10-05 | End: 2022-10-05 | Stop reason: HOSPADM

## 2022-10-05 RX ORDER — EPHEDRINE SULFATE 50 MG/ML
INJECTION, SOLUTION INTRAVENOUS
Status: DISCONTINUED | OUTPATIENT
Start: 2022-10-05 | End: 2022-10-05

## 2022-10-05 RX ORDER — LIDOCAINE HYDROCHLORIDE 20 MG/ML
INJECTION, SOLUTION EPIDURAL; INFILTRATION; INTRACAUDAL; PERINEURAL
Status: DISCONTINUED | OUTPATIENT
Start: 2022-10-05 | End: 2022-10-05

## 2022-10-05 RX ORDER — PANTOPRAZOLE SODIUM 40 MG/1
40 TABLET, DELAYED RELEASE ORAL DAILY
Qty: 30 TABLET | Refills: 11 | Status: SHIPPED | OUTPATIENT
Start: 2022-10-05 | End: 2022-11-01 | Stop reason: SDUPTHER

## 2022-10-05 RX ORDER — FENTANYL CITRATE 50 UG/ML
INJECTION, SOLUTION INTRAMUSCULAR; INTRAVENOUS
Status: DISCONTINUED | OUTPATIENT
Start: 2022-10-05 | End: 2022-10-05

## 2022-10-05 RX ORDER — PHENYLEPHRINE HYDROCHLORIDE 10 MG/ML
INJECTION INTRAVENOUS
Status: DISCONTINUED | OUTPATIENT
Start: 2022-10-05 | End: 2022-10-05

## 2022-10-05 RX ORDER — DEXAMETHASONE SODIUM PHOSPHATE 4 MG/ML
INJECTION, SOLUTION INTRA-ARTICULAR; INTRALESIONAL; INTRAMUSCULAR; INTRAVENOUS; SOFT TISSUE
Status: DISCONTINUED | OUTPATIENT
Start: 2022-10-05 | End: 2022-10-05

## 2022-10-05 RX ORDER — PHENYLEPHRINE HYDROCHLORIDE 10 MG/ML
INJECTION INTRAVENOUS CONTINUOUS PRN
Status: DISCONTINUED | OUTPATIENT
Start: 2022-10-05 | End: 2022-10-05

## 2022-10-05 RX ORDER — SODIUM CHLORIDE 0.9 % (FLUSH) 0.9 %
10 SYRINGE (ML) INJECTION
Status: DISCONTINUED | OUTPATIENT
Start: 2022-10-05 | End: 2022-10-05 | Stop reason: HOSPADM

## 2022-10-05 RX ORDER — SUCCINYLCHOLINE CHLORIDE 20 MG/ML
INJECTION INTRAMUSCULAR; INTRAVENOUS
Status: DISCONTINUED | OUTPATIENT
Start: 2022-10-05 | End: 2022-10-05

## 2022-10-05 RX ORDER — HEPARIN SOD,PORCINE/0.9 % NACL 1000/500ML
INTRAVENOUS SOLUTION INTRAVENOUS
Status: DISCONTINUED | OUTPATIENT
Start: 2022-10-05 | End: 2022-10-05 | Stop reason: HOSPADM

## 2022-10-05 RX ORDER — ACETAMINOPHEN 325 MG/1
650 TABLET ORAL EVERY 4 HOURS PRN
Status: DISCONTINUED | OUTPATIENT
Start: 2022-10-05 | End: 2022-10-05 | Stop reason: HOSPADM

## 2022-10-05 RX ORDER — MIDAZOLAM HYDROCHLORIDE 1 MG/ML
INJECTION, SOLUTION INTRAMUSCULAR; INTRAVENOUS
Status: DISCONTINUED | OUTPATIENT
Start: 2022-10-05 | End: 2022-10-05

## 2022-10-05 RX ORDER — DROPERIDOL 2.5 MG/ML
0.62 INJECTION, SOLUTION INTRAMUSCULAR; INTRAVENOUS ONCE AS NEEDED
Status: DISCONTINUED | OUTPATIENT
Start: 2022-10-05 | End: 2022-10-05 | Stop reason: HOSPADM

## 2022-10-05 RX ADMIN — HEPARIN SODIUM 3000 UNITS: 1000 INJECTION, SOLUTION INTRAVENOUS; SUBCUTANEOUS at 11:10

## 2022-10-05 RX ADMIN — FUROSEMIDE 40 MG: 10 INJECTION, SOLUTION INTRAMUSCULAR; INTRAVENOUS at 12:10

## 2022-10-05 RX ADMIN — HEPARIN SODIUM 3000 UNITS: 1000 INJECTION, SOLUTION INTRAVENOUS; SUBCUTANEOUS at 10:10

## 2022-10-05 RX ADMIN — EPHEDRINE SULFATE 5 MG: 50 INJECTION INTRAVENOUS at 10:10

## 2022-10-05 RX ADMIN — HEPARIN SODIUM 15000 UNITS: 1000 INJECTION, SOLUTION INTRAVENOUS; SUBCUTANEOUS at 09:10

## 2022-10-05 RX ADMIN — PROTAMINE SULFATE 75 MG: 10 INJECTION, SOLUTION INTRAVENOUS at 12:10

## 2022-10-05 RX ADMIN — PROPOFOL 150 MG: 10 INJECTION, EMULSION INTRAVENOUS at 08:10

## 2022-10-05 RX ADMIN — DEXAMETHASONE SODIUM PHOSPHATE 4 MG: 4 INJECTION INTRA-ARTICULAR; INTRALESIONAL; INTRAMUSCULAR; INTRAVENOUS; SOFT TISSUE at 08:10

## 2022-10-05 RX ADMIN — FENTANYL CITRATE 50 MCG: 0.05 INJECTION, SOLUTION INTRAMUSCULAR; INTRAVENOUS at 08:10

## 2022-10-05 RX ADMIN — HEPARIN SODIUM 12 UNITS/KG/HR: 10000 INJECTION, SOLUTION INTRAVENOUS at 09:10

## 2022-10-05 RX ADMIN — PHENYLEPHRINE HYDROCHLORIDE 80 MCG: 10 INJECTION INTRAVENOUS at 08:10

## 2022-10-05 RX ADMIN — FENTANYL CITRATE 50 MCG: 0.05 INJECTION, SOLUTION INTRAMUSCULAR; INTRAVENOUS at 12:10

## 2022-10-05 RX ADMIN — LIDOCAINE HYDROCHLORIDE 80 MG: 20 INJECTION, SOLUTION EPIDURAL; INFILTRATION; INTRACAUDAL; PERINEURAL at 08:10

## 2022-10-05 RX ADMIN — SUCCINYLCHOLINE CHLORIDE 120 MG: 20 INJECTION, SOLUTION INTRAMUSCULAR; INTRAVENOUS; PARENTERAL at 08:10

## 2022-10-05 RX ADMIN — SODIUM CHLORIDE, SODIUM GLUCONATE, SODIUM ACETATE, POTASSIUM CHLORIDE, MAGNESIUM CHLORIDE, SODIUM PHOSPHATE, DIBASIC, AND POTASSIUM PHOSPHATE: .53; .5; .37; .037; .03; .012; .00082 INJECTION, SOLUTION INTRAVENOUS at 08:10

## 2022-10-05 RX ADMIN — Medication 50 MG: at 09:10

## 2022-10-05 RX ADMIN — MIDAZOLAM 2 MG: 1 INJECTION INTRAMUSCULAR; INTRAVENOUS at 08:10

## 2022-10-05 RX ADMIN — GLYCOPYRROLATE 0.2 MG: 0.2 INJECTION INTRAMUSCULAR; INTRAVENOUS at 08:10

## 2022-10-05 RX ADMIN — EPHEDRINE SULFATE 5 MG: 50 INJECTION INTRAVENOUS at 09:10

## 2022-10-05 RX ADMIN — PHENYLEPHRINE HYDROCHLORIDE 0.25 MCG/KG/MIN: 10 INJECTION INTRAVENOUS at 08:10

## 2022-10-05 RX ADMIN — SODIUM CHLORIDE: 9 INJECTION, SOLUTION INTRAVENOUS at 08:10

## 2022-10-05 NOTE — SUBJECTIVE & OBJECTIVE
Past Medical History:   Diagnosis Date    Abnormal Pap smear of cervix     Anticoagulant long-term use     CHF (congestive heart failure)     Encounter for blood transfusion     Heart murmur     atrial fibrillation    Myocardial infarction     Stroke        Past Surgical History:   Procedure Laterality Date    AUGMENTATION OF BREAST      COSMETIC SURGERY      UTERINE ARTERY EMBOLIZATION  2000       Review of patient's allergies indicates:  No Known Allergies    No current facility-administered medications on file prior to encounter.     Current Outpatient Medications on File Prior to Encounter   Medication Sig    amiodarone (PACERONE) 200 MG Tab Take 1 tablet (200 mg total) by mouth once daily.    apixaban (ELIQUIS) 2.5 mg Tab Take 1 tablet (2.5 mg total) by mouth 2 (two) times daily.    COVID-19 vacc,mRNA,Moderna,/PF (MODERNA COVID-19 VACCINE, EUA, IM)     furosemide (LASIX) 40 MG tablet Take 1 tablet prn swelling    losartan (COZAAR) 25 MG tablet Take 1 tablet (25 mg total) by mouth once daily.     Family History       Problem Relation (Age of Onset)    Ovarian cancer Maternal Aunt (60)          Tobacco Use    Smoking status: Never    Smokeless tobacco: Never   Substance and Sexual Activity    Alcohol use: Not Currently     Comment: rare    Drug use: No    Sexual activity: Yes     Partners: Male     Birth control/protection: None     Review of Systems   All other systems reviewed and are negative.  Objective:     Vital Signs (Most Recent):    Vital Signs (24h Range):  BP: ()/()   Arterial Line BP: ()/()           There is no height or weight on file to calculate BMI.            Physical Exam  Vitals and nursing note reviewed.   Constitutional:       Appearance: Normal appearance.   Cardiovascular:      Rate and Rhythm: Normal rate. Rhythm irregular.      Pulses: Normal pulses.      Heart sounds: Normal heart sounds.   Musculoskeletal:      Cervical back: Normal range of motion and neck supple.      Right lower  leg: No edema.      Left lower leg: No edema.   Skin:     General: Skin is warm.   Neurological:      General: No focal deficit present.      Mental Status: She is alert and oriented to person, place, and time.       Significant Labs: All pertinent lab results from the last 24 hours have been reviewed.

## 2022-10-05 NOTE — CONSULTS
[facility] Stay Cardiac [facility]  ROW[facility]  Consult Note    Patient Name: Maria Dolores Tamyao  MRN: [id number]  Admission Date: 10/5/2022  Hospital Length of Stay: 0 days  Code Status: Prior   Attending Provider: PLACIDO Cervantes MD   Consulting Provider: Catarino Hardwick MD  Primary Care Physician: Rebecca Yoder MD  Principal Problem:<principal problem not specified>    Patient information was obtained from patient and ER records.     Consults  Subjective:     Chief Complaint:  AFib     HPI:   Ms. 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 She was previously admitted from 4/22-25/2022 from her rehabilitation center with several alerts from her LifeVest, consistent with multiple events of atrial fibrillation with RVR. She returns to clinic today for ongoing discussion regarding plans for a pulmonary vein isolation ablation procedure.      Ms. Tamayo presents to clinic today with her . In discussion with Ms. Tamayo, she tells me that she is feeling overall quite well since her discharge. She has since had recovery of her systolic function, with LVEF 55%, and no longer requires a LifeVest. She denies any further episodes of atrial fibrillation on her Apple watch, and denies any symptoms similar to her previous palpitations with her prior episodes of atrial fibrillation. She remains on amiodarone 200mg po daily in addition to apixaban 5mg po BID. She denies any adverse bleeding events while maintained on oral anticoagulation. She continues to recover following her prior stroke with no residual deficits. She is planning to start hyperbaric oxygen therapy for  speech recovery per her neurologist. She remains in sinus rhythm on all subsequent ECGs, with no evidence of prolonged QT, nonsustained or sustained VT. Review of her venogram obtained in [place] at OSH reveals collateralization of the left subclavian vein. I called her outside hospital in an effort to obtain strips of her prior episode of WCT. Unfortunately, this event occurred in the CT scanner and no strips or ECGs were recorded. Currently, she has no indication for ICD implantation. We have planned to obtain a cardiac MRI in order to further assess for possible VT substrate in the future. We continue to discuss undergoing catheter ablation of her atrial fibrillation.       She denies any episodes of dizziness, lightheadedness, syncope/presyncope, chest pain or chest discomfort, palpitations, nausea or vomiting, orthopnea, lower extremity edema, or PND. She reports baseline mild shortness of breath and dyspnea with exertion that she feels has remained stable for her. She can climb more than one flight of stairs prior to needing to take a break.     She presents today for PVI with Dr Cervantes.     Anticoagulant/antiplatelets: Eliquis  ECG: Sinus Bradycardia    Dysphagia or odynophagia:  No  Liver Disease, esophageal disease, or known varices:  No  Upper GI Bleeding: No  Snoring:  No  Sleep Apnea:  No  Prior neck surgery or radiation:  No  History of anesthetic difficulties:  No  Family history of anesthetic difficulties:  No  Last oral intake: yesterday before midnight  Able to move neck in all directions:  Yes  Platelet count: 371K (9/20/22)  INR: 1.0 (9/20/22)        Past Medical History:   Diagnosis Date    Abnormal Pap smear of cervix     Anticoagulant long-term use     CHF (congestive heart failure)     Coronary artery disease     Encounter for blood transfusion     Heart murmur     atrial fibrillation    Myocardial infarction     Stroke        Past Surgical History:   Procedure Laterality Date     AUGMENTATION OF BREAST      COSMETIC SURGERY      UTERINE ARTERY EMBOLIZATION  2000       Review of patient's allergies indicates:  No Known Allergies    No current facility-administered medications on file prior to encounter.     Current Outpatient Medications on File Prior to Encounter   Medication Sig    apixaban (ELIQUIS) 2.5 mg Tab Take 1 tablet (2.5 mg total) by mouth 2 (two) times daily.    furosemide (LASIX) 40 MG tablet Take 1 tablet prn swelling    losartan (COZAAR) 25 MG tablet Take 1 tablet (25 mg total) by mouth once daily.    amiodarone (PACERONE) 200 MG Tab Take 1 tablet (200 mg total) by mouth once daily.    COVID-19 vacc,mRNA,Moderna,/PF (MODERNA COVID-19 VACCINE, EUA, IM)      Family History       Problem Relation (Age of Onset)    Ovarian cancer Maternal Aunt (60)          Tobacco Use    Smoking status: Never    Smokeless tobacco: Never   Substance and Sexual Activity    Alcohol use: Not Currently     Comment: rare    Drug use: No    Sexual activity: Yes     Partners: Male     Birth control/protection: None     Review of Systems   Constitutional: Negative.   Objective:     Vital Signs (Most Recent):  Temp: 98.6 °F (37 °C) (10/05/22 0610)  Pulse: (!) 54 (10/05/22 0610)  Resp: 16 (10/05/22 0610)  BP: 123/60 (10/05/22 0611)  SpO2: 97 % (10/05/22 0610)   Vital Signs (24h Range):  Temp:  [98.6 °F (37 °C)] 98.6 °F (37 °C)  Pulse:  [54] 54  Resp:  [16] 16  SpO2:  [97 %] 97 %  BP: (123-126)/(59-60) 123/60     Weight: 74.4 kg (164 lb)  Body mass index is 28.15 kg/m².    SpO2: 97 %  O2 Device (Oxygen Therapy): room air    No intake or output data in the 24 hours ending 10/05/22 0639    Lines/Drains/Airways       Peripheral Intravenous Line  Duration                  Peripheral IV - Single Lumen 10/05/22 0621 20 G Left;Posterior Wrist <1 day         Peripheral IV - Single Lumen 10/05/22 0625 20 G Right Antecubital <1 day                    Physical Exam  Constitutional:       General: She is not in acute  distress.     Appearance: Normal appearance. She is not ill-appearing.   HENT:      Head: Normocephalic.      Mouth/Throat:      Mouth: Mucous membranes are moist.   Eyes:      Extraocular Movements: Extraocular movements intact.   Cardiovascular:      Rate and Rhythm: Regular rhythm. Bradycardia present.      Pulses: Normal pulses.   Pulmonary:      Effort: Pulmonary effort is normal.   Abdominal:      Palpations: Abdomen is soft.   Musculoskeletal:      Cervical back: Neck supple.      Right lower leg: No edema.      Left lower leg: No edema.   Skin:     General: Skin is warm.   Neurological:      General: No focal deficit present.      Mental Status: She is alert and oriented to person, place, and time.       Significant Labs: BMP: No results for input(s): GLU, NA, K, CL, CO2, BUN, CREATININE, CALCIUM, MG in the last 48 hours., CMP No results for input(s): NA, K, CL, CO2, GLU, BUN, CREATININE, CALCIUM, PROT, ALBUMIN, BILITOT, ALKPHOS, AST, ALT, ANIONGAP, ESTGFRAFRICA, EGFRNONAA in the last 48 hours., CBC No results for input(s): WBC, HGB, HCT, PLT in the last 48 hours., INR No results for input(s): INR, PROTIME in the last 48 hours., Lipid Panel No results for input(s): CHOL, HDL, LDLCALC, TRIG, CHOLHDL in the last 48 hours., and Troponin No results for input(s): TROPONINI in the last 48 hours.    Significant Imaging:   TTE 4/23/22  The left ventricle is normal in size with eccentric hypertrophy and normal systolic function. The estimated ejection fraction is 55%.  Normal right ventricular size with normal right ventricular systolic function.  Mild left atrial enlargement.  Normal central venous pressure (3 mmHg).  Atrial fibrillation present throughout the examination.    Assessment and Plan:     Atrial fibrillation  1. ROWDY for evaluation of WHITNEY thrombus prior to PVI for Afib.   -No absolute contraindications of esophageal stricture, tumor, perforation, laceration,or diverticulum and/or active GI bleed  -The  risks, benefits & alternatives of the procedure were explained to the patient.   -The risks of transesophageal echo include but are not limited to:  Dental trauma, esophageal trauma/perforation, bleeding, laryngospasm/brochospasm, aspiration, sore throat/hoarseness, & dislodgement of the endotracheal tube/nasogastric tube (where applicable).    -The risks of moderate sedation include hypotension, respiratory depression, arrhythmias, bronchospasm, & death.    -Informed consent was obtained. The patient is agreeable to proceed with the procedure and all questions and concerns addressed.    Case discussed with an attending in echocardiography lab.    Further recommendations per attending addendum.      After further discussing with Dr Cervantes this morning, we will defer ROWDY. Patient missed one dose of Eliquis at least one month ago, but otherwise compliant. EKG in sinus bradycardia. Relayed to Dr Gupta as well.     VTE Risk Mitigation (From admission, onward)      None            Thank you for your consult. I will sign off. Please contact us if you have any additional questions.    Catarino Hardwick MD  Cardiology   [facility]

## 2022-10-05 NOTE — SUBJECTIVE & OBJECTIVE
Past Medical History:   Diagnosis Date    Abnormal Pap smear of cervix     Anticoagulant long-term use     CHF (congestive heart failure)     Coronary artery disease     Encounter for blood transfusion     Heart murmur     atrial fibrillation    Myocardial infarction     Stroke        Past Surgical History:   Procedure Laterality Date    AUGMENTATION OF BREAST      COSMETIC SURGERY      UTERINE ARTERY EMBOLIZATION  2000       Review of patient's allergies indicates:  No Known Allergies    No current facility-administered medications on file prior to encounter.     Current Outpatient Medications on File Prior to Encounter   Medication Sig    apixaban (ELIQUIS) 2.5 mg Tab Take 1 tablet (2.5 mg total) by mouth 2 (two) times daily.    furosemide (LASIX) 40 MG tablet Take 1 tablet prn swelling    losartan (COZAAR) 25 MG tablet Take 1 tablet (25 mg total) by mouth once daily.    amiodarone (PACERONE) 200 MG Tab Take 1 tablet (200 mg total) by mouth once daily.    COVID-19 vacc,mRNA,Moderna,/PF (MODERNA COVID-19 VACCINE, EUA, IM)      Family History       Problem Relation (Age of Onset)    Ovarian cancer Maternal Aunt (60)          Tobacco Use    Smoking status: Never    Smokeless tobacco: Never   Substance and Sexual Activity    Alcohol use: Not Currently     Comment: rare    Drug use: No    Sexual activity: Yes     Partners: Male     Birth control/protection: None     Review of Systems   Constitutional: Negative.   Objective:     Vital Signs (Most Recent):  Temp: 98.6 °F (37 °C) (10/05/22 0610)  Pulse: (!) 54 (10/05/22 0610)  Resp: 16 (10/05/22 0610)  BP: 123/60 (10/05/22 0611)  SpO2: 97 % (10/05/22 0610)   Vital Signs (24h Range):  Temp:  [98.6 °F (37 °C)] 98.6 °F (37 °C)  Pulse:  [54] 54  Resp:  [16] 16  SpO2:  [97 %] 97 %  BP: (123-126)/(59-60) 123/60     Weight: 74.4 kg (164 lb)  Body mass index is 28.15 kg/m².    SpO2: 97 %  O2 Device (Oxygen Therapy): room air    No intake or output data in the 24 hours ending  10/05/22 0639    Lines/Drains/Airways       Peripheral Intravenous Line  Duration                  Peripheral IV - Single Lumen 10/05/22 0621 20 G Left;Posterior Wrist <1 day         Peripheral IV - Single Lumen 10/05/22 0625 20 G Right Antecubital <1 day                    Physical Exam  Constitutional:       General: She is not in acute distress.     Appearance: Normal appearance. She is not ill-appearing.   HENT:      Head: Normocephalic.      Mouth/Throat:      Mouth: Mucous membranes are moist.   Eyes:      Extraocular Movements: Extraocular movements intact.   Cardiovascular:      Rate and Rhythm: Regular rhythm. Bradycardia present.      Pulses: Normal pulses.   Pulmonary:      Effort: Pulmonary effort is normal.   Abdominal:      Palpations: Abdomen is soft.   Musculoskeletal:      Cervical back: Neck supple.      Right lower leg: No edema.      Left lower leg: No edema.   Skin:     General: Skin is warm.   Neurological:      General: No focal deficit present.      Mental Status: She is alert and oriented to person, place, and time.       Significant Labs: BMP: No results for input(s): GLU, NA, K, CL, CO2, BUN, CREATININE, CALCIUM, MG in the last 48 hours., CMP No results for input(s): NA, K, CL, CO2, GLU, BUN, CREATININE, CALCIUM, PROT, ALBUMIN, BILITOT, ALKPHOS, AST, ALT, ANIONGAP, ESTGFRAFRICA, EGFRNONAA in the last 48 hours., CBC No results for input(s): WBC, HGB, HCT, PLT in the last 48 hours., INR No results for input(s): INR, PROTIME in the last 48 hours., Lipid Panel No results for input(s): CHOL, HDL, LDLCALC, TRIG, CHOLHDL in the last 48 hours., and Troponin No results for input(s): TROPONINI in the last 48 hours.    Significant Imaging:   TTE 4/23/22  The left ventricle is normal in size with eccentric hypertrophy and normal systolic function. The estimated ejection fraction is 55%.  Normal right ventricular size with normal right ventricular systolic function.  Mild left atrial  enlargement.  Normal central venous pressure (3 mmHg).  Atrial fibrillation present throughout the examination.

## 2022-10-05 NOTE — ASSESSMENT & PLAN NOTE
51yo female with a history of CVA, nonischemic myocarditis (recovered EF), AF here for PVI-RFA.    Anticoagulation:Eliquis 2.5mg twice daily given interaction with amiodarone potentially increasing levels  EF (most recent): 55%  AAD/AVN agents: Amiodarone 200mg daily    The risks, benefits and alternatives of the procedure were explained to the patient, patient's family and/or surrogate decision maker. Risks include (but not limited to) bleeding, hematoma, infection, pain, vascular damage, damage to structures surrounding the vasculature, myocardial damage [perforation, valvular damage], cardiac tamponade, phrenic nerve damage, pulmonary vein stenosis, atrioesophageal (AE) fistula, CVA, MI, and death. Patient is understanding that repeat ablations may be required. All questions were answered. Patient is understanding of these risks, and would like to proceed. Consents signed.

## 2022-10-05 NOTE — TRANSFER OF CARE
"Anesthesia Transfer of Care Note    Patient: Maria Dolores Tamayo    Procedure(s) Performed: Procedure(s) (LRB):  Ablation atrial fibrillation (N/A)    Patient location: PACU    Anesthesia Type: general    Transport from OR: Transported from OR on 6-10 L/min O2 by face mask with adequate spontaneous ventilation    Post pain: adequate analgesia    Post assessment: tolerated procedure well and no apparent anesthetic complications    Post vital signs: stable    Level of consciousness: alert and awake    Nausea/Vomiting: no nausea/vomiting    Complications: none    Transfer of care protocol was followed      Last vitals:   Visit Vitals  BP (!) 105/53   Pulse 68   Temp 36.4 °C (97.5 °F) (Temporal)   Resp (!) 23   Ht 5' 4" (1.626 m)   Wt 74.4 kg (164 lb)   LMP 07/04/2022 (Approximate)   SpO2 100%   Breastfeeding No   BMI 28.15 kg/m²     "

## 2022-10-05 NOTE — ANESTHESIA PROCEDURE NOTES
Arterial    Diagnosis: atrial fibrillation    Patient location during procedure: done in OR  Procedure start time: 10/5/2022 8:42 AM  Timeout: 10/5/2022 8:41 AM  Procedure end time: 10/5/2022 8:44 AM    Staffing  Authorizing Provider: ESTER Vazquez MD  Performing Provider: ESTER Vazquez MD    Anesthesiologist was present at the time of the procedure.  Arterial  Skin Prep: chlorhexidine gluconate and isopropyl alcohol  Local Infiltration: none  Orientation: right  Location: radial    Catheter Size: 22 G  Catheter placement by Ultrasound guidance. Heme positive aspiration all ports.   Vessel Caliber: small, patent  Needle advanced into vessel with real time Ultrasound guidance.Insertion Attempts: 2  Assessment  Dressing: secured with tape and tegaderm  Patient: Tolerated well

## 2022-10-05 NOTE — Clinical Note
A percutaneous stick to the left femoral and right femoral vein was performed. Ultrasound guidance was used to obtain access.

## 2022-10-05 NOTE — ANESTHESIA PROCEDURE NOTES
Intubation    Date/Time: 10/5/2022 8:29 AM  Performed by: Ellyn Cantu CRNA  Authorized by: ESTER Vazquez MD     Intubation:     Induction:  Intravenous    Intubated:  Postinduction    Mask Ventilation:  Easy mask    Attempts:  1    Attempted By:  CRNA    Method of Intubation:  Video laryngoscopy    Blade:  Palma 3    Laryngeal View Grade: Grade I - full view of cords      Difficult Airway Encountered?: No      Complications:  None    Airway Device:  Oral endotracheal tube    Airway Device Size:  7.0    Style/Cuff Inflation:  Cuffed (inflated to minimal occlusive pressure)    Tube secured:  22    Secured at:  The lips    Placement Verified By:  Capnometry    Complicating Factors:  None    Findings Post-Intubation:  BS equal bilateral and atraumatic/condition of teeth unchanged

## 2022-10-05 NOTE — PROGRESS NOTES
Pt's bp 91/52. Dr. Cervantes at bedside and aware of BP.  Pt states her BP usually runs low. MD said ok to continue with POC to go to [facility] to complete bedrest.

## 2022-10-05 NOTE — HOSPITAL COURSE
51yo female with a history of CVA, nonischemic myocarditis (recovered EF), and paroxysmal AF who underwent successful PVI 10/5/22......

## 2022-10-05 NOTE — BRIEF OP NOTE
Attending: Selene Cervantes MD  Date of Procedure: 10/5/22    Post-operative Diagnosis: AF    Procedure Performed: Pulmonary vein isolation of all 4 pulmonary veins via radiofrequency ablation.     Description of Procedure: The patient was brought to the EP lab in the fasting state. Prepped and draped in sterile fashion. Safety timeout was performed. Sedation administered by anesthesia staff. Ultrasound guided venous access of the bilateral femoral veins was performed. ICE and CS catheters placed via left femoral vein access. Long sheaths via right femoral venous access. Heparin bolus and continuous infusion per protocol. Two transseptal punctures performed using combination of fluoroscopic and ICE guidance. Map created. RFA to isolate all pulmonary veins. ICE confirmed no significant pericardial effusion.     EBL: <10 mL    Specimens: none  Complications: no immediate    Plan:  Bedrest x 6 hrs  Remove sutures at 4 hours post-procedure  Ambulation at 6 hours post-procedure if there is no evidence of access site complications  Close monitoring of hemodynamics, access site, and neurologic status  ECG upon arrival to PACU  Patient to resume OAC this evening. If bleeding or hematoma formation, please notify EP service before holding OAC  Medication changes: initiation of Protonix 40 mg daily x 30 days  Recommend ibuprofen 800 mg TID x 3 days for pericarditis post-procedure  Plan for discharge following bedrest if patient tolerating PO intake, voiding, and ambulatory without evidence of complications     Sarai Raphael MD PGY VI  Electrophysiology

## 2022-10-05 NOTE — Clinical Note
Myrnaigo and ablation catheter attempted to be positioned into the LA while the SL1 wire is holding the transseptal.

## 2022-10-05 NOTE — ANESTHESIA PREPROCEDURE EVALUATION
10/05/2022  Maria Dolores Tamayo is a 52 y.o., female.  [facility]  Anesthesia Pre-Operative Evaluation       Patient Name: Maria Dolores Tamayo  YOB: 1970  MRN: [id number]  [facility]: [id number]      Code Status: Prior   Date of Procedure: 10/5/2022  Anesthesia: General Procedure: Procedure(s) (LRB):  Ablation atrial fibrillation (N/A)  Transesophageal echo (ROWDY) intra-procedure log documentation (N/A)  Pre-Operative Diagnosis: Paroxysmal atrial fibrillation [I48.0]  Other specified cardiac arrhythmias [I49.8]  Proceduralist: Surgeon(s) and Role:  Panel 1:     * PLACIDO Cervantes MD - Primary  Panel 2:     * [facility] Diagnostic Provider - Primary        SUBJECTIVE:   Maria Dolores Tamayo is a 52 y.o. female who  has a past medical history of Abnormal Pap smear of cervix, Anticoagulant long-term use, CHF (congestive heart failure), Coronary artery disease, Encounter for blood transfusion, Heart murmur, Myocardial infarction, and Stroke.   she has a current medication list which includes the following long-term medication(s): furosemide, losartan, medroxyprogesterone, amiodarone, and atorvastatin.   ALLERGIES:   Review of patient's allergies indicates:  No Known Allergies  LDA:      Lines/Drains/Airways     Peripheral Intravenous Line  Duration                Peripheral IV - Single Lumen 10/05/22 0621 20 G Left;Posterior Wrist <1 day         Peripheral IV - Single Lumen 10/05/22 0625 20 G Right Antecubital <1 day                History:     Past Medical History:   Diagnosis Date    Abnormal Pap smear of cervix     Anticoagulant long-term use     CHF (congestive heart failure)     Coronary artery disease     Encounter for blood transfusion     Heart murmur     atrial fibrillation    Myocardial infarction     Stroke      Patient Active Problem List   Diagnosis    Atrial fibrillation  with RVR    Chronic systolic heart failure    Diverticulitis    Uterine fibroid    Hx of myocarditis    Nonischemic cardiomyopathy    Atrial fibrillation    Dysfunctional uterine bleeding    Current use of long term anticoagulation    History of cerebrovascular accident (CVA) from left carotid artery occlusion involving left middle cerebral artery territory       Active Hospital Problems    Diagnosis  POA    Atrial fibrillation [I48.91]  Yes      Resolved Hospital Problems   No resolved problems to display.     Surgical History:    has a past surgical history that includes Cosmetic surgery; Augmentation of breast; and Uterine artery embolization (2000).   Social History:   .  reports that she has never smoked. She has never used smokeless tobacco. She reports that she does not currently use alcohol. She reports that she does not use drugs.     OBJECTIVE:     Vital Signs (Most Recent):  Temp: 37 °C (98.6 °F) (10/05/22 0610)  Pulse: (!) 54 (10/05/22 0610)  Resp: 16 (10/05/22 0610)  BP: 123/60 (10/05/22 0611)  SpO2: 97 % (10/05/22 0610) Vital Signs Range (Last 24H):  Temp:  [37 °C (98.6 °F)]   Pulse:  [54]   Resp:  [16]   BP: (123-126)/(59-60)   SpO2:  [97 %]        Body mass index is 28.15 kg/m².   Wt Readings from Last 4 Encounters:   10/05/22 74.4 kg (164 lb)   09/20/22 77 kg (169 lb 12.1 oz)   08/08/22 76.7 kg (169 lb)   07/29/22 78 kg (172 lb)     Significant Labs:  Lab Results   Component Value Date    WBC 5.98 09/20/2022    HGB 9.8 (L) 09/20/2022    HCT 32.9 (L) 09/20/2022     09/20/2022     09/20/2022    K 4.6 09/20/2022     09/20/2022    CREATININE 0.9 09/20/2022    BUN 17 09/20/2022    CO2 22 (L) 09/20/2022    GLU 92 09/20/2022    CALCIUM 9.4 09/20/2022    MG 2.0 07/30/2022    PHOS 3.3 07/30/2022    ALKPHOS 59 07/30/2022    ALT 18 07/30/2022    AST 17 07/30/2022    ALBUMIN 3.2 (L) 07/30/2022    INR 1.0 09/20/2022    APTT 24.6 09/20/2022    HGBA1C 5.0 09/20/2022    TROPONINI 0.027  (H) 04/23/2022    BNP 73 07/30/2022     Patient's last menstrual period was 07/04/2022 (approximate).  Recent Results (from the past 72 hour(s))   POCT urine pregnancy    Collection Time: 10/05/22  6:28 AM   Result Value Ref Range    POC Preg Test, Ur Negative Negative     Acceptable Yes        EKG:   Results for orders placed or performed during the hospital encounter of 05/02/22   EKG 12-lead    Collection Time: 05/02/22  9:46 AM    Narrative    Test Reason : I48.91,    Vent. Rate : 040 BPM     Atrial Rate : 040 BPM     P-R Int : 160 ms          QRS Dur : 092 ms      QT Int : 568 ms       P-R-T Axes : 027 -08 014 degrees     QTc Int : 462 ms    Marked sinus bradycardia  Otherwise normal ECG  When compared with ECG of 23-APR-2022 02:19,  Premature ventricular complexes are no longer Present  Confirmed by Gume Gupta MD ([id number]) on 5/2/2022 11:04:46 AM    Referred By: KAMLA SPANGLER           Confirmed By:Gume Gupta MD       TTE:  Results for orders placed or performed during the hospital encounter of 04/22/22   Echo   Result Value Ref Range    EF 55 %    Narrative    · The left ventricle is normal in size with eccentric hypertrophy and   normal systolic function. The estimated ejection fraction is 55%.  · Normal right ventricular size with normal right ventricular systolic   function.  · Mild left atrial enlargement.  · Normal central venous pressure (3 mmHg).  · Atrial fibrillation present throughout the examination.        EF   Date Value Ref Range Status   04/23/2022 55 % Final        ASSESSMENT/PLAN:         Pre-op Assessment    I have reviewed the Patient Summary Reports.     I have reviewed the Nursing Notes. I have reviewed the NPO Status.   I have reviewed the Medications.     Review of Systems      Physical Exam  General: Well nourished, Cooperative and Alert    Airway:  Mallampati: III / II  Mouth Opening: Normal  TM Distance: Normal  Tongue: Normal  Neck ROM: Normal  ROM    Chest/Lungs:  Normal Respiratory Rate    Heart:  Rate: Normal  Rhythm: Regular Rhythm        Anesthesia Plan  Type of Anesthesia, risks & benefits discussed:    Anesthesia Type: Gen ETT  Intra-op Monitoring Plan: Standard ASA Monitors and Art Line  Post Op Pain Control Plan: IV/PO Opioids PRN  Induction:  IV  Informed Consent: Informed consent signed with the Patient and all parties understand the risks and agree with anesthesia plan.  All questions answered.   ASA Score: 3  Day of Surgery Review of History & Physical: H&P Update referred to the surgeon/provider.    Ready For Surgery From Anesthesia Perspective.     .

## 2022-10-05 NOTE — HPI
Ms. 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 She was previously admitted from 4/22-25/2022 from her rehabilitation center with several alerts from her LifeVest, consistent with multiple events of atrial fibrillation with RVR. She returns to clinic today for ongoing discussion regarding plans for a pulmonary vein isolation ablation procedure.      Ms. Tamayo presents to clinic today with her . In discussion with Ms. Tamayo, she tells me that she is feeling overall quite well since her discharge. She has since had recovery of her systolic function, with LVEF 55%, and no longer requires a LifeVest. She denies any further episodes of atrial fibrillation on her Apple watch, and denies any symptoms similar to her previous palpitations with her prior episodes of atrial fibrillation. She remains on amiodarone 200mg po daily in addition to apixaban 5mg po BID. She denies any adverse bleeding events while maintained on oral anticoagulation. She continues to recover following her prior stroke with no residual deficits. She is planning to start hyperbaric oxygen therapy for speech recovery per her neurologist. She remains in sinus rhythm on all subsequent ECGs, with no evidence of prolonged QT, nonsustained or sustained VT. Review of her venogram obtained in [place] at OSH reveals collateralization of the left subclavian vein. I called her outside hospital in an effort to obtain strips of her prior episode of WCT. Unfortunately, this event occurred in the CT scanner and no strips or ECGs were recorded. Currently, she has no indication for ICD implantation. We have  planned to obtain a cardiac MRI in order to further assess for possible VT substrate in the future. We continue to discuss undergoing catheter ablation of her atrial fibrillation.       She denies any episodes of dizziness, lightheadedness, syncope/presyncope, chest pain or chest discomfort, palpitations, nausea or vomiting, orthopnea, lower extremity edema, or PND. She reports baseline mild shortness of breath and dyspnea with exertion that she feels has remained stable for her. She can climb more than one flight of stairs prior to needing to take a break.     She presents today for PVI with Dr Cervantes.     Anticoagulant/antiplatelets: Eliquis  ECG: Sinus Bradycardia    Dysphagia or odynophagia:  No  Liver Disease, esophageal disease, or known varices:  No  Upper GI Bleeding: No  Snoring:  No  Sleep Apnea:  No  Prior neck surgery or radiation:  No  History of anesthetic difficulties:  No  Family history of anesthetic difficulties:  No  Last oral intake: yesterday before midnight  Able to move neck in all directions:  Yes  Platelet count: 371K (9/20/22)  INR: 1.0 (9/20/22)     No

## 2022-10-05 NOTE — ASSESSMENT & PLAN NOTE
1. ROWDY for evaluation of WHITNEY thrombus prior to PVI for Afib.   -No absolute contraindications of esophageal stricture, tumor, perforation, laceration,or diverticulum and/or active GI bleed  -The risks, benefits & alternatives of the procedure were explained to the patient.   -The risks of transesophageal echo include but are not limited to:  Dental trauma, esophageal trauma/perforation, bleeding, laryngospasm/brochospasm, aspiration, sore throat/hoarseness, & dislodgement of the endotracheal tube/nasogastric tube (where applicable).    -The risks of moderate sedation include hypotension, respiratory depression, arrhythmias, bronchospasm, & death.    -Informed consent was obtained. The patient is agreeable to proceed with the procedure and all questions and concerns addressed.    Case discussed with an attending in echocardiography lab.    Further recommendations per attending addendum.

## 2022-10-05 NOTE — H&P
[facility] - Short Stay Cardiac Unit  Cardiac Electrophysiology  History and Physical     Admission Date: 10/5/2022  Code Status: Prior   Attending Provider: PLACIDO Cervantes MD   Principal Problem:<principal problem not specified>    Subjective:     Chief Complaint:  AF     HPI:  Ms. 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 She was previously admitted from 4/22-25/2022 from her rehabilitation center with several alerts from her LifeVest, consistent with multiple events of atrial fibrillation with RVR. She has recovery of her systolic function with no recorded prior VT. (Suspicion was that her prior episode may have been aberrant atrial fibrillation at OSH rather than true VT - this occurred in a CT scanner with no tracings available to confirm.) We may consider obtaining a cardiac MRI following ablation for complete assessment of LGE for risk stratification for potential VT.     This patient will present to the EP laboratory to undergo a ROWDY/radiofrequency pulmonary vein isolation ablation at her next availability. She will remain on amiodarone antiarrhythmic and apixaban oral anticoagulation therapy. All questions and concerns were addressed at this encounter.     EF: 55%  AAD: Amiodarone  ECG: Sinus bradycardia        Past Medical History:   Diagnosis Date    Abnormal Pap smear of cervix     Anticoagulant long-term use     CHF (congestive heart failure)     Encounter for blood transfusion     Heart murmur     atrial fibrillation    Myocardial infarction     Stroke        Past Surgical History:   Procedure Laterality Date    AUGMENTATION OF BREAST       COSMETIC SURGERY      UTERINE ARTERY EMBOLIZATION  2000       Review of patient's allergies indicates:  No Known Allergies    No current facility-administered medications on file prior to encounter.     Current Outpatient Medications on File Prior to Encounter   Medication Sig    amiodarone (PACERONE) 200 MG Tab Take 1 tablet (200 mg total) by mouth once daily.    apixaban (ELIQUIS) 2.5 mg Tab Take 1 tablet (2.5 mg total) by mouth 2 (two) times daily.    COVID-19 vacc,mRNA,Moderna,/PF (MODERNA COVID-19 VACCINE, EUA, IM)     furosemide (LASIX) 40 MG tablet Take 1 tablet prn swelling    losartan (COZAAR) 25 MG tablet Take 1 tablet (25 mg total) by mouth once daily.     Family History       Problem Relation (Age of Onset)    Ovarian cancer Maternal Aunt (60)          Tobacco Use    Smoking status: Never    Smokeless tobacco: Never   Substance and Sexual Activity    Alcohol use: Not Currently     Comment: rare    Drug use: No    Sexual activity: Yes     Partners: Male     Birth control/protection: None     Review of Systems   All other systems reviewed and are negative.  Objective:     Vital Signs (Most Recent):    Vital Signs (24h Range):  BP: ()/()   Arterial Line BP: ()/()           There is no height or weight on file to calculate BMI.            Physical Exam  Vitals and nursing note reviewed.   Constitutional:       Appearance: Normal appearance.   Cardiovascular:      Rate and Rhythm: Normal rate. Rhythm irregular.      Pulses: Normal pulses.      Heart sounds: Normal heart sounds.   Musculoskeletal:      Cervical back: Normal range of motion and neck supple.      Right lower leg: No edema.      Left lower leg: No edema.   Skin:     General: Skin is warm.   Neurological:      General: No focal deficit present.      Mental Status: She is alert and oriented to person, place, and time.       Significant Labs: All pertinent lab results from the last 24 hours have been reviewed.      Assessment and Plan:      Atrial fibrillation  53yo female with a history of CVA, nonischemic myocarditis (recovered EF), AF here for PVI-RFA.    Anticoagulation:Eliquis 2.5mg twice daily given interaction with amiodarone potentially increasing levels  EF (most recent): 55%  AAD/AVN agents: Amiodarone 200mg daily    The risks, benefits and alternatives of the procedure were explained to the patient, patient's family and/or surrogate decision maker. Risks include (but not limited to) bleeding, hematoma, infection, pain, vascular damage, damage to structures surrounding the vasculature, myocardial damage [perforation, valvular damage], cardiac tamponade, phrenic nerve damage, pulmonary vein stenosis, atrioesophageal (AE) fistula, CVA, MI, and death. Patient is understanding that repeat ablations may be required. All questions were answered. Patient is understanding of these risks, and would like to proceed. Consents signed.        Sarai Raphael MD  Cardiac Electrophysiology  [facility] - Short Stay Cardiac Unit

## 2022-10-05 NOTE — DISCHARGE SUMMARY
[facility] - Cardiology  Cardiac Electrophysiology  Discharge Summary      Patient Name: Maria Dolores Tamayo  MRN: [id number]  Admission Date: 10/5/2022  Hospital Length of Stay: 0 days  Discharge Date and Time:  10/05/2022 5:23 PM  Attending Physician: PLACIDO Cervantes MD    Discharging Provider: Sarai Raphael MD  Primary Care Physician: Rebecca Yoder MD    HPI:   Ms. 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 She was previously admitted from 4/22-25/2022 from her rehabilitation center with several alerts from her LifeVest, consistent with multiple events of atrial fibrillation with RVR. She has recovery of her systolic function with no recorded prior VT. (Suspicion was that her prior episode may have been aberrant atrial fibrillation at OSH rather than true VT - this occurred in a CT scanner with no tracings available to confirm.) We may consider obtaining a cardiac MRI following ablation for complete assessment of LGE for risk stratification for potential VT.     This patient will present to the EP laboratory to undergo a ROWDY/radiofrequency pulmonary vein isolation ablation at her next availability. She will remain on amiodarone antiarrhythmic and apixaban oral anticoagulation therapy. All questions and concerns were addressed at this encounter.     EF: 55%  AAD: Amiodarone  ECG: Sinus bradycardia        Procedure(s) (LRB):  Ablation atrial fibrillation (N/A)     Indwelling Lines/Drains at time of discharge:  Lines/Drains/Airways     Drain  Duration                Urethral Catheter 10/05/22 0852 Double-lumen;Non-latex 16 Fr. <1 day                Hospital  Course:  53yo female with a history of CVA, nonischemic myocarditis (recovered EF), and paroxysmal AF who underwent successful PVI 10/5/22......      Goals of Care Treatment Preferences:  Code Status: Full Code      Consults:     Significant Diagnostic Studies: Labs: All labs within the past 24 hours have been reviewed    Pending Diagnostic Studies:     Procedure Component Value Units Date/Time    Transesophageal echo (ROWDY) [[id number]]     Order Status: Sent Lab Status: No result           Final Active Diagnoses:    Diagnosis Date Noted POA    Atrial fibrillation [I48.91] 07/29/2022 Yes      Problems Resolved During this Admission:     No new Assessment & Plan notes have been filed under this hospital service since the last note was generated.  Service: Arrhythmia      Discharged Condition: stable    Disposition: Home or Self Care    Follow Up:   Follow-up Information     A Selene Cervantes MD Follow up in 3 month(s).    Specialty: Electrophysiology  Contact information:  [address] [place] [id number]  [phone]                       Patient Instructions:   No discharge procedures on file.  Medications:  Reconciled Home Medications:      Medication List      START taking these medications    pantoprazole 40 MG tablet  Commonly known as: PROTONIX  Take 1 tablet (40 mg total) by mouth once daily.        CONTINUE taking these medications    amiodarone 200 MG Tab  Commonly known as: PACERONE  Take 1 tablet (200 mg total) by mouth once daily.     apixaban 2.5 mg Tab  Commonly known as: ELIQUIS  Take 1 tablet (2.5 mg total) by mouth 2 (two) times daily.     atorvastatin 40 MG tablet  Commonly known as: LIPITOR  Take 1 tablet (40 mg total) by mouth once daily.     furosemide 40 MG tablet  Commonly known as: LASIX  Take 1 tablet prn swelling     losartan 25 MG tablet  Commonly known as: COZAAR  Take 1 tablet (25 mg total) by mouth once daily.     medroxyPROGESTERone 10 MG tablet  Commonly known as:  PROVERA  TAKE 1 TABLET BY MOUTH EVERY DAY     MODERNA COVID-19 VACCINE (EUA) IM            Time spent on the discharge of patient: 45 minutes    Sarai Raphael MD  Cardiac Electrophysiology  [facility] - Cardiology

## 2022-10-05 NOTE — HPI
Ms. 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 She was previously admitted from 4/22-25/2022 from her rehabilitation center with several alerts from her LifeVest, consistent with multiple events of atrial fibrillation with RVR. She has recovery of her systolic function with no recorded prior VT. (Suspicion was that her prior episode may have been aberrant atrial fibrillation at OSH rather than true VT - this occurred in a CT scanner with no tracings available to confirm.) We may consider obtaining a cardiac MRI following ablation for complete assessment of LGE for risk stratification for potential VT.     This patient will present to the EP laboratory to undergo a ROWDY/radiofrequency pulmonary vein isolation ablation at her next availability. She will remain on amiodarone antiarrhythmic and apixaban oral anticoagulation therapy. All questions and concerns were addressed at this encounter.     EF: 55%  AAD: Amiodarone  ECG: Sinus bradycardia

## 2022-10-05 NOTE — DISCHARGE INSTRUCTIONS
"Make sure to continue taking your blood thinner apixiban (trade name: Eliquis) after your procedure as you would normally take it  Take pantoprazole (trade name: Protonix) nightly for at least 30 days after your procedure. This is to protect your esophagus during the post-operative period.  You may experience chest discomfort (also known as "pericarditis") with deep breathes or coughing which is normal following your procedure. If this occurs, you can take ibuprofen (Motrin) 800 mg every 8 hours for 2-3 days. If the chest pain is persistent or severe please visit the nearest emergency department.  You can take furosemide (trade name: Lasix) daily or twice daily as needed for fluid retention following your ablation  Do not take baths or submerge your groin area or at least 1 week or when the puncture sites in your groin have completely healed  Do not lift anything over 10 lbs for the first week after your procedure, and avoid strenuous activity during this time to allow for the groin sites to heal. After 1 week, there are no activity restrictions.  Please contact the electrophysiology clinic or go to the ER if you experience: severe chest pain, shortness of breath, bleeding or swelling of the groin sites, or any other concerns.Discharge Instructions and Care of Your Groin      Catheter Insertion Site  The morning after your procedure, you may take the dressing off. The easiest way to do this is when you are showering, get the tape and dressing wet and remove it.  After the bandage is removed, cover the area with a small adhesive bandage. It is normal for the catheter insertion site to be black and blue for a couple of days. The site may also be slightly swollen and pink, and there may be a small lump (about the size of a quarter) at the site.  Wash the catheter insertion site at least once daily with soap and water. Place soapy water on your hand or washcloth and gently wash the insertion site; do not rub.  Keep the " area clean and dry when you are not showering.  Do not use creams, lotions or ointment on the wound site.  Wear loose clothes and loose underwear.  Do not take a bath, tub soak, go in a Jacuzzi, or swim in a pool or lake for one week after the procedure.    Activity Instructions  Do not strain during bowel movements for the first 3 to 4 days after the procedure to prevent bleeding from the catheter insertion site.  Avoid heavy lifting (more than 10 pounds) and pushing or pulling heavy objects for the first 5 to 7 days after the procedure.  Do not participate in strenuous activities for 5 days after the procedure. This includes most sports - jogging, golfing, play tennis, and bowling.  You may climb stairs if needed, but walk up and down the stairs more slowly than usual.  Gradually increase your activities until you reach your normal activity level within one week after the procedure.      If bleeding should occur following discharge:  Sit down and apply firm pressure to the puncture site with your fingers for 10 minutes   If the bleeding stops, continue to sit quietly, keeping your leg straight for 2 hours. Notify your physician as soon as possible   If bleeding does not stop after 10 minutes or if there is a large amount of bleeding or spurting, call 911 immediately.  Do not drive yourself to the hospital.     Notify your physician if these symptoms persist or if you experience:  Change in color or temperature of the leg  Redness, heat, or pus at the puncture site  Chills or fever greater than 101.0 F

## 2022-10-06 NOTE — ANESTHESIA POSTPROCEDURE EVALUATION
Anesthesia Post Evaluation    Patient: Maria Dolores Tamayo    Procedure(s) Performed: Procedure(s) (LRB):  Ablation atrial fibrillation (N/A)    Final Anesthesia Type: general      Patient location during evaluation: PACU  Patient participation: Yes- Able to Participate  Level of consciousness: awake and alert, awake and oriented  Post-procedure vital signs: reviewed and stable  Pain management: adequate  Airway patency: patent    PONV status at discharge: No PONV  Anesthetic complications: no      Cardiovascular status: blood pressure returned to baseline, hemodynamically stable and stable  Respiratory status: unassisted, spontaneous ventilation and room air  Hydration status: euvolemic  Follow-up not needed.          Vitals Value Taken Time   /53 10/05/22 1845   Temp 36.4 °C (97.5 °F) 10/05/22 1335   Pulse 60 10/05/22 1845   Resp 18 10/05/22 1845   SpO2 99 % 10/05/22 1715         No case tracking events are documented in the log.      Pain/Angus Score: Angus Score: 9 (10/5/2022  3:10 PM)

## 2022-10-06 NOTE — PROGRESS NOTES
Patient has ambulated and voided without issues; Bilateral groin dressings are clean, dry, and intact; Family picked up Protonix script from pharmacy; discharge instructions explained, questions answered

## 2022-10-14 ENCOUNTER — TELEPHONE (OUTPATIENT)
Dept: ELECTROPHYSIOLOGY | Facility: CLINIC | Age: 52
End: 2022-10-14
Payer: COMMERCIAL

## 2022-10-14 NOTE — TELEPHONE ENCOUNTER
Called patient to follow up after her procedure.  No answer, left message. Called to inquire if she is having any symptoms, complications post ABLATION, or any questions.  Call back number provided

## 2022-10-19 NOTE — PROGRESS NOTES
Contacted patient regarding two week follow up concerning a fib cardiac ablation procedure performed by Dr. Cervantes. Patient states that she is doing well, and continues to take her amiodarone. Patient denies any symptoms of infection, problems urinating, fluid overload, SOB, or chest pain. Reviewed follow up appointment date and time.Taking all medications as prescribed. No concerns at this time.

## 2022-10-20 ENCOUNTER — CLINICAL SUPPORT (OUTPATIENT)
Dept: INTERNAL MEDICINE | Facility: CLINIC | Age: 52
End: 2022-10-20
Payer: COMMERCIAL

## 2022-10-20 ENCOUNTER — OFFICE VISIT (OUTPATIENT)
Dept: INTERNAL MEDICINE | Facility: CLINIC | Age: 52
End: 2022-10-20
Payer: COMMERCIAL

## 2022-10-20 VITALS
HEART RATE: 56 BPM | WEIGHT: 170.63 LBS | BODY MASS INDEX: 29.29 KG/M2 | SYSTOLIC BLOOD PRESSURE: 111 MMHG | DIASTOLIC BLOOD PRESSURE: 65 MMHG

## 2022-10-20 DIAGNOSIS — Z86.79 HISTORY OF ATRIAL FIBRILLATION: ICD-10-CM

## 2022-10-20 DIAGNOSIS — Z86.73 HISTORY OF CVA (CEREBROVASCULAR ACCIDENT): ICD-10-CM

## 2022-10-20 DIAGNOSIS — K82.4 GALLBLADDER POLYP: ICD-10-CM

## 2022-10-20 DIAGNOSIS — Z00.00 ROUTINE GENERAL MEDICAL EXAMINATION AT A HEALTH CARE FACILITY: Primary | ICD-10-CM

## 2022-10-20 DIAGNOSIS — D50.9 IRON DEFICIENCY ANEMIA, UNSPECIFIED IRON DEFICIENCY ANEMIA TYPE: Primary | ICD-10-CM

## 2022-10-20 DIAGNOSIS — D25.9 UTERINE LEIOMYOMA, UNSPECIFIED LOCATION: ICD-10-CM

## 2022-10-20 DIAGNOSIS — D50.9 IRON DEFICIENCY ANEMIA, UNSPECIFIED IRON DEFICIENCY ANEMIA TYPE: ICD-10-CM

## 2022-10-20 DIAGNOSIS — N95.1 PERIMENOPAUSAL: ICD-10-CM

## 2022-10-20 DIAGNOSIS — N83.202 CYST OF LEFT OVARY: ICD-10-CM

## 2022-10-20 LAB
ALBUMIN SERPL BCP-MCNC: 4.2 G/DL (ref 3.5–5.2)
ALP SERPL-CCNC: 64 U/L (ref 55–135)
ALT SERPL W/O P-5'-P-CCNC: 16 U/L (ref 10–44)
ANION GAP SERPL CALC-SCNC: 10 MMOL/L (ref 8–16)
AST SERPL-CCNC: 15 U/L (ref 10–40)
BASOPHILS # BLD AUTO: 0.05 K/UL (ref 0–0.2)
BASOPHILS NFR BLD: 0.5 % (ref 0–1.9)
BILIRUB SERPL-MCNC: 0.4 MG/DL (ref 0.1–1)
BUN SERPL-MCNC: 16 MG/DL (ref 6–20)
CALCIUM SERPL-MCNC: 9.8 MG/DL (ref 8.7–10.5)
CHLORIDE SERPL-SCNC: 106 MMOL/L (ref 95–110)
CO2 SERPL-SCNC: 23 MMOL/L (ref 23–29)
CREAT SERPL-MCNC: 0.9 MG/DL (ref 0.5–1.4)
DIFFERENTIAL METHOD: ABNORMAL
EOSINOPHIL # BLD AUTO: 0.1 K/UL (ref 0–0.5)
EOSINOPHIL NFR BLD: 1.2 % (ref 0–8)
ERYTHROCYTE [DISTWIDTH] IN BLOOD BY AUTOMATED COUNT: 16 % (ref 11.5–14.5)
EST. GFR  (NO RACE VARIABLE): >60 ML/MIN/1.73 M^2
FERRITIN SERPL-MCNC: 11 NG/ML (ref 20–300)
FSH SERPL-ACNC: 4.68 MIU/ML
GLUCOSE SERPL-MCNC: 99 MG/DL (ref 70–110)
HCT VFR BLD AUTO: 33.2 % (ref 37–48.5)
HGB BLD-MCNC: 9.7 G/DL (ref 12–16)
IMM GRANULOCYTES # BLD AUTO: 0.03 K/UL (ref 0–0.04)
IMM GRANULOCYTES NFR BLD AUTO: 0.3 % (ref 0–0.5)
IRON SERPL-MCNC: 21 UG/DL (ref 30–160)
LYMPHOCYTES # BLD AUTO: 1.6 K/UL (ref 1–4.8)
LYMPHOCYTES NFR BLD: 17.5 % (ref 18–48)
MCH RBC QN AUTO: 22.4 PG (ref 27–31)
MCHC RBC AUTO-ENTMCNC: 29.2 G/DL (ref 32–36)
MCV RBC AUTO: 77 FL (ref 82–98)
MONOCYTES # BLD AUTO: 0.6 K/UL (ref 0.3–1)
MONOCYTES NFR BLD: 6.4 % (ref 4–15)
NEUTROPHILS # BLD AUTO: 6.8 K/UL (ref 1.8–7.7)
NEUTROPHILS NFR BLD: 74.1 % (ref 38–73)
NRBC BLD-RTO: 0 /100 WBC
PLATELET # BLD AUTO: 359 K/UL (ref 150–450)
PMV BLD AUTO: 11.1 FL (ref 9.2–12.9)
POTASSIUM SERPL-SCNC: 3.8 MMOL/L (ref 3.5–5.1)
PROT SERPL-MCNC: 7.4 G/DL (ref 6–8.4)
RBC # BLD AUTO: 4.33 M/UL (ref 4–5.4)
SATURATED IRON: 3 % (ref 20–50)
SODIUM SERPL-SCNC: 139 MMOL/L (ref 136–145)
TOTAL IRON BINDING CAPACITY: 653 UG/DL (ref 250–450)
TRANSFERRIN SERPL-MCNC: 441 MG/DL (ref 200–375)
WBC # BLD AUTO: 9.22 K/UL (ref 3.9–12.7)

## 2022-10-20 PROCEDURE — 99999 PR PBB SHADOW E&M-EST. PATIENT-LVL I: CPT | Mod: CHM,PBBFAC,,

## 2022-10-20 PROCEDURE — 4010F ACE/ARB THERAPY RXD/TAKEN: CPT | Mod: CPTII,S$GLB,, | Performed by: INTERNAL MEDICINE

## 2022-10-20 PROCEDURE — 1159F PR MEDICATION LIST DOCUMENTED IN MEDICAL RECORD: ICD-10-PCS | Mod: CPTII,S$GLB,, | Performed by: INTERNAL MEDICINE

## 2022-10-20 PROCEDURE — 1159F MED LIST DOCD IN RCRD: CPT | Mod: CPTII,S$GLB,, | Performed by: INTERNAL MEDICINE

## 2022-10-20 PROCEDURE — 3074F SYST BP LT 130 MM HG: CPT | Mod: CPTII,S$GLB,, | Performed by: INTERNAL MEDICINE

## 2022-10-20 PROCEDURE — 3074F PR MOST RECENT SYSTOLIC BLOOD PRESSURE < 130 MM HG: ICD-10-PCS | Mod: CPTII,S$GLB,, | Performed by: INTERNAL MEDICINE

## 2022-10-20 PROCEDURE — 82728 ASSAY OF FERRITIN: CPT | Performed by: INTERNAL MEDICINE

## 2022-10-20 PROCEDURE — 4010F PR ACE/ARB THEARPY RXD/TAKEN: ICD-10-PCS | Mod: CPTII,S$GLB,, | Performed by: INTERNAL MEDICINE

## 2022-10-20 PROCEDURE — 99999 PR PBB SHADOW E&M-EST. PATIENT-LVL III: ICD-10-PCS | Mod: PBBFAC,,, | Performed by: INTERNAL MEDICINE

## 2022-10-20 PROCEDURE — 3044F HG A1C LEVEL LT 7.0%: CPT | Mod: CPTII,S$GLB,, | Performed by: INTERNAL MEDICINE

## 2022-10-20 PROCEDURE — 80053 COMPREHEN METABOLIC PANEL: CPT | Performed by: INTERNAL MEDICINE

## 2022-10-20 PROCEDURE — 99204 PR OFFICE/OUTPT VISIT, NEW, LEVL IV, 45-59 MIN: ICD-10-PCS | Mod: S$GLB,,, | Performed by: INTERNAL MEDICINE

## 2022-10-20 PROCEDURE — 99204 OFFICE O/P NEW MOD 45 MIN: CPT | Mod: S$GLB,,, | Performed by: INTERNAL MEDICINE

## 2022-10-20 PROCEDURE — 85025 COMPLETE CBC W/AUTO DIFF WBC: CPT | Performed by: INTERNAL MEDICINE

## 2022-10-20 PROCEDURE — 99999 PR PBB SHADOW E&M-EST. PATIENT-LVL III: CPT | Mod: PBBFAC,,, | Performed by: INTERNAL MEDICINE

## 2022-10-20 PROCEDURE — 3078F PR MOST RECENT DIASTOLIC BLOOD PRESSURE < 80 MM HG: ICD-10-PCS | Mod: CPTII,S$GLB,, | Performed by: INTERNAL MEDICINE

## 2022-10-20 PROCEDURE — 3044F PR MOST RECENT HEMOGLOBIN A1C LEVEL <7.0%: ICD-10-PCS | Mod: CPTII,S$GLB,, | Performed by: INTERNAL MEDICINE

## 2022-10-20 PROCEDURE — 84466 ASSAY OF TRANSFERRIN: CPT | Performed by: INTERNAL MEDICINE

## 2022-10-20 PROCEDURE — 99999 PR PBB SHADOW E&M-EST. PATIENT-LVL I: CPT | Mod: PBBFAC,,,

## 2022-10-20 PROCEDURE — 99999 PR PBB SHADOW E&M-EST. PATIENT-LVL I: ICD-10-PCS | Mod: PBBFAC,,,

## 2022-10-20 PROCEDURE — 99999 PR PBB SHADOW E&M-EST. PATIENT-LVL I: ICD-10-PCS | Mod: CHM,PBBFAC,,

## 2022-10-20 PROCEDURE — 36415 COLL VENOUS BLD VENIPUNCTURE: CPT | Performed by: INTERNAL MEDICINE

## 2022-10-20 PROCEDURE — 3078F DIAST BP <80 MM HG: CPT | Mod: CPTII,S$GLB,, | Performed by: INTERNAL MEDICINE

## 2022-10-20 PROCEDURE — 83001 ASSAY OF GONADOTROPIN (FSH): CPT | Performed by: INTERNAL MEDICINE

## 2022-10-20 NOTE — PROGRESS NOTES
"  Nutrition Assessment  Session Time:  60 minutes      Client name:  Maria Dolores Tamayo  :  1970  Age:  52 y.o.  Gender:  female    Client states:  A very pleasant Ms. Tamayo is here for her first  Health examination. She is  with one adult son and 2 stepchildren. 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 She had a history of CHF and dysrhythmia which her doctor believes resulted from a virus she contracted 10 years ago. She had a health crisis in April of this year when she had a stroke on a flight to [place] and subsequently developed Afib and had an MI and diverticulitis during her hospital stay. Prior to this she mainly ate her meals at restaurants and did not make the healthiest choices much of the time. Over the last month, she has completely changed her lifestyle and diet to include HIIT workouts 4 days a week and following the V-Shred diet, which mainly focuses on eating heart healthy whole foods with adequate protein, low saturated fat, low sugar and whole grains. She is very motivated to get healthy. She has also gained 10 pounds since April and would like to get back to her original weight. Her challenge is convincing her , who has experienced his own health issues over the last 2 years due to cancer, to join her in making healthy lifestyle changes. Unable to review her labs for today as she had not had her blood drawn yet, but we did review her labs from last month, which were WNL.    Anthropometrics  Height:  64"     Weight:  170.1 lbs  BMI:  29.3  % Body Fat:  37.9    Clinical Signs/Symptoms  N/V/D:  none  Appetite:  good       Past Medical History:   Diagnosis Date    Abnormal Pap smear of cervix     Anticoagulant long-term use     CHF (congestive heart failure)     Coronary artery disease     Encounter for blood transfusion     Heart murmur     atrial fibrillation    Myocardial infarction     Stroke        Past " Surgical History:   Procedure Laterality Date    ABLATION OF ARRHYTHMOGENIC FOCUS FOR ATRIAL FIBRILLATION N/A 10/5/2022    Procedure: Ablation atrial fibrillation;  Surgeon: PLACIDO Cervantes MD;  [facility]: [facility];  Service: Cardiology;  Laterality: N/A;  AF, ROWDY, PVI, RFA, Carto, Gen, EH, 3 Prep    AUGMENTATION OF BREAST      COSMETIC SURGERY      UTERINE ARTERY EMBOLIZATION  2000       Medications    has a current medication list which includes the following prescription(s): amiodarone, apixaban, furosemide, losartan, medroxyprogesterone, and pantoprazole.    Vitamins, Minerals, and/or Supplements:  none     Food/Medication Interactions:  Reviewed     Food Allergies or Intolerances:  NKFA     Social History    Marital status:    Employment:  President of [employer]    Social History     Tobacco Use    Smoking status: Never    Smokeless tobacco: Never   Substance Use Topics    Alcohol use: Not Currently     Comment: rare        Lab Reports   Sodium   Date Value Ref Range Status   09/20/2022 138 136 - 145 mmol/L Final     Potassium   Date Value Ref Range Status   09/20/2022 4.6 3.5 - 5.1 mmol/L Final     Chloride   Date Value Ref Range Status   09/20/2022 106 95 - 110 mmol/L Final     CO2   Date Value Ref Range Status   09/20/2022 22 (L) 23 - 29 mmol/L Final     Glucose   Date Value Ref Range Status   09/20/2022 92 70 - 110 mg/dL Final     BUN   Date Value Ref Range Status   09/20/2022 17 6 - 20 mg/dL Final     Creatinine   Date Value Ref Range Status   09/20/2022 0.9 0.5 - 1.4 mg/dL Final     Calcium   Date Value Ref Range Status   09/20/2022 9.4 8.7 - 10.5 mg/dL Final     Total Protein   Date Value Ref Range Status   07/30/2022 5.9 (L) 6.0 - 8.4 g/dL Final     Albumin   Date Value Ref Range Status   07/30/2022 3.2 (L) 3.5 - 5.2 g/dL Final     Total Bilirubin   Date Value Ref Range Status   07/30/2022 0.4 0.1 - 1.0 mg/dL Final     Comment:     For infants and newborns, interpretation of results  should be based  on gestational age, weight and in agreement with clinical  observations.    Premature Infant recommended reference ranges:  Up to 24 hours.............<8.0 mg/dL  Up to 48 hours............<12.0 mg/dL  3-5 days..................<15.0 mg/dL  6-29 days.................<15.0 mg/dL       Alkaline Phosphatase   Date Value Ref Range Status   07/30/2022 59 55 - 135 U/L Final     AST   Date Value Ref Range Status   07/30/2022 17 10 - 40 U/L Final     ALT   Date Value Ref Range Status   07/30/2022 18 10 - 44 U/L Final     Anion Gap   Date Value Ref Range Status   09/20/2022 10 8 - 16 mmol/L Final     eGFR if    Date Value Ref Range Status   07/30/2022 >60 >60 mL/min/1.73 m^2 Final     eGFR if non    Date Value Ref Range Status   07/30/2022 >60 >60 mL/min/1.73 m^2 Final     Comment:     Calculation used to obtain the estimated glomerular filtration  rate (eGFR) is the CKD-EPI equation.         Lab Results   Component Value Date    WBC 5.98 09/20/2022    HGB 9.8 (L) 09/20/2022    HCT 32.9 (L) 09/20/2022    MCV 80 (L) 09/20/2022     09/20/2022        Lab Results   Component Value Date    CHOL 184 09/20/2022     Lab Results   Component Value Date    HDL 52 09/20/2022     Lab Results   Component Value Date    LDLCALC 110.8 09/20/2022     Lab Results   Component Value Date    TRIG 106 09/20/2022     Lab Results   Component Value Date    CHOLHDL 28.3 09/20/2022     Lab Results   Component Value Date    HGBA1C 5.0 09/20/2022     BP Readings from Last 1 Encounters:   10/20/22 111/65       Food History  Breakfast:  2 scrambled eggs with 1 pork sausage reinier and berries or tangerine OR oatmeal with honey and cinnamon and berries; sometimes with a diet coke  Mid-morning Snack:  none  Lunch:  2 cups spinach or spring mix with 1/2 cup non-starchy vegetables (bell pepper, celery, broccoli, tomato) and 2 Tbsp vinaigrette OR hummus with a wrap with chicken, cucumber,  carrots  Mid-afternoon Snack:  apple with almond butter & cinnamon OR almonds & dark chocolate OR macadamia nuts with a string cheese  Dinner:  shredded boiled chicken with black beans and some Malaysian rice on a bed of lettuce and tomatoes with salsa  Snack:  none  *Fluid intake:  water, diet coke  ETOH: a martini once in a while when with friends    Exercise History:  prior to stroke: no formal exercise, 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    Intervention    RMR (Method:  InBody):  1405 kcal  Activity Factor:  1.3    ROWDY:  1827 kcal - 250 = 1577 kcal    Goals:  1.  Continue heart healthy diet changes.  2.  Continue 20-minute cardiac intensive workouts and consider increasing to 45-minute workouts as able.    Nutrition Education  The following education was provided to the patient:  Complimented patient on proactive role in health maintenance.  Complimented patient on dietary compliance/modifications and resulting health improvements.  Complimented patient on physical activity efforts.  Discussed meal planning/[email or web address]'s My Plate design.  Discussed Heart Healthy Eating.  Discussed macronutrient distribution among meals and snacks, including CHO, protein, and fat recommendations and its importance/benefits.  *Lab results were pending at time of consult and so, not discussed with patient.  Discussed recommended servings of non-starchy vegetables/day and food sources of such.  Discussed goal setting.  Provided ongoing support, encouragement, and guidance toward improved health efforts.    Patient verbalized understanding of nutrition education and recommendations received.    Handouts Provided  Meal Planning  Guide  Restaurant Guide  Eat Fit Shopping List  Eat Fit Ann  Fueling Well On-The-Go    Monitoring/Evaluation    Monitor the following:  Weight  BMI  % Body Fat  Caloric intake  Labs:  none    Follow Up Plan:  Communication with referring healthcare provider is unnecessary at this time as patient presented as part of annual wellness exam.  However, will follow up with patient in 1-2 years.

## 2022-10-20 NOTE — PROGRESS NOTES
Subjective:       Patient ID: Maria Dolores Tamayo is a 52 y.o. female.    Chief Complaint: No chief complaint on file.    HPI  Pt. Has no significant pulmonary history.  Patient had a right sided stoke and a subsequent MI in April 2022.  Patient was unaware that she was in atrial fibrillation for which she underwent an ablation 2 weeks ago and is now in normal sinus rhythm.  Patient hopes to get off the amiodarone, atorvastatin, and losartan is a few month but will continue taking Eliquis as a precautionary measure.      Physical Limitations:  Patient denied any limitations to physical activity.      Current exercise routine:  Patient currently participates in a 20 minute high intensity aerobic exercise routine, 4 days a week.  Patient stretches an average of 2 days a week.  Patient does not follow nay formal flexibility routine at the current time.    Goals:  Patient set a goal weight of 155 lbs.    Fun Facts:  Patient was very friendly and engaged.  Patient noted that she feels very halima to be alive and well after her medical emergency.  She has been caring for her  who has cancer.  Her physician believes that a virus she had about 10 years ago resulted in myocarditis, CHF, and dysrhythmia.  She was cleared to exercise and started her current routine last week.  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  We discussed starting slow and eventually building up to higher intensity exercise.  Patient asked questions and was receptive to all recommendations made.      Review of Systems      Objective:    The fitness evaluation results are as follows:  D.O.S. 10/20/2022   Height (in): 64   Weight (lbs): 170.1   BMI: 29.600374   Body Fat (%): 37.90   Waist (cm): 88   RBP (mmHg): 112/64   RHR (bpm): 58    Strength Dominant (Lbs): 72    Strength Non Dominant (Lbs): 75   Push-up Assessment: 13   Curl-up Assessment: 36   Flexibility Testing (cm): 36   REE (kcals): 1405     Physical Exam     Assessment:     Age/gender stratified assessment:  Resting BP: Within Normal Limits   Body Fat %: Poor   Waist Circumference: Low Risk    Strength Dominant: 75th    Strength Non Dominant: 75th   Upper Body Endurance: Very Good   Abdominal Endurance: Above Average   Lower body Flexibiltiy: Very Good     Problem List Items Addressed This Visit    None  Visit Diagnoses       Routine general medical examination at a health care facility    -  Primary              Plan:       Recommended fitness guidelines:    -150 minutes of moderate intensity aerobic exercise per week or 75 minutes of vigorous intensity aerobic exercise per week.    -2 to 4 days per week of resistance training for each muscle group.   Practice some body-weight, full-body resistance training exercises,  such as push-ups, planks, squats, and lunges, 2 days a week.    -Daily stretching with a hold of at least 30 seconds per muscle group.

## 2022-10-22 NOTE — PROGRESS NOTES
Subjective:       Patient ID: Maria Dolores Tamayo is a 52 y.o. female.    Chief Complaint: Establish Care    52-year-old nonsmoking female who comes from [facility] to establish with [facility].  Prior to last April the patient really had no medical issues except very heavy menstrual cycles from fibroid tumors.  Last April while traveling with friends to [place] she had a L MCA embolic stroke from P atrial fib.  At the time the patient was near [place] and had helicopter transport to the nearest stroke facility and was given tPA.    3 days post tPA the patient CODED secondary to a wide complex tachyarrhythmia and was in the ICU ventilated for 4 days.  From the hospitalization she went to rehab with a life vest on here at the [facility] and had an episode of acute diverticulitis during that time period.  Patient had a fairly uneventful 6 weeks from the end of May to June until she started having a menstrual cycle that extended over weeks at a time and on July 29th , she became very lightheaded and was readmitted with a hemoglobin of 6.0.  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    She has had a colonoscopy and keeps up with her mammograms     Review of Systems   Constitutional:  Negative for activity change, appetite change, chills, diaphoresis, fatigue, fever and unexpected weight change.   HENT:  Negative for nasal congestion, ear pain, mouth sores, postnasal drip, sinus pressure/congestion, sore throat and trouble swallowing.    Eyes:  Negative for pain, redness and visual disturbance.   Respiratory:  Negative for apnea, cough, chest tightness, shortness of breath and wheezing.    Cardiovascular:  Negative for chest pain, palpitations and leg swelling.   Gastrointestinal:  Negative for abdominal distention, abdominal pain, blood in stool, constipation, diarrhea,  nausea and vomiting.   Endocrine: Negative for cold intolerance, polydipsia, polyphagia and polyuria.   Genitourinary:  Negative for difficulty urinating, dysuria, flank pain, frequency, hematuria, menstrual problem, pelvic pain and urgency.   Musculoskeletal:  Negative for arthralgias, back pain, joint swelling and neck pain.   Integumentary:  Negative for color change, rash and wound.   Neurological:  Negative for dizziness, tremors, seizures, syncope, weakness, light-headedness, numbness and headaches.   Hematological:  Negative for adenopathy. Does not bruise/bleed easily.   Psychiatric/Behavioral:  Negative for confusion, decreased concentration, dysphoric mood, hallucinations, self-injury, sleep disturbance and suicidal ideas. The patient is not nervous/anxious.        Objective:      Physical Exam  Eyes:      General: No scleral icterus.  Neck:      Vascular: No carotid bruit.   Cardiovascular:      Rate and Rhythm: Normal rate and regular rhythm.      Heart sounds: No murmur heard.  Pulmonary:      Effort: Pulmonary effort is normal.      Breath sounds: Normal breath sounds. No rales.   Abdominal:      General: Bowel sounds are normal. There is no distension.      Palpations: Abdomen is soft.      Tenderness: There is no abdominal tenderness.   Musculoskeletal:         General: No tenderness.      Cervical back: Normal range of motion and neck supple.   Neurological:      Mental Status: She is alert and oriented to person, place, and time.   Psychiatric:         Mood and Affect: Mood normal.         Behavior: Behavior normal.         Thought Content: Thought content normal.         Judgment: Judgment normal.       Health Maintenance   Topic Date Due    TETANUS VACCINE  Never done    High Dose Statin  11/03/2022 (Originally 2/4/1991)    Mammogram  01/10/2023    Lipid Panel  09/20/2027    Hepatitis C Screening  Completed     Health Maintenance Reviewed    Assessment:       Iron deficiency anemia, unspecified  iron deficiency anemia type  Comments:  Still moderately anemic so would substitute with ferrous gluconate and OTC vitamin-C tablet at least 3 times a week  Orders:  -     CBC Auto Differential; Future; Expected date: 10/20/2022  -     Comprehensive Metabolic Panel; Future; Expected date: 10/20/2022  -     FERRITIN; Future; Expected date: 10/20/2022  -     Iron and TIBC; Future; Expected date: 10/20/2022    Uterine leiomyoma, There are 2 fibroids measuring 6.8 and 3.1 cm  Comments:  Cause of month long menstrual period from early July to end of July required admission hospitalization.    History of atrial fibrillation- s/p ablation 10/5.2022   Comments:  Last echo 4/2022 EF -55 percent and mild enlarged atrium (in atrial fib at that time)- following with EP ; on amiodarone 200 mg daily as well as apixaban     History of CVA (cerebrovascular accident) with minimal residual - minimal expressive aphasia    Noted - has seen neurology as well    Cyst of left ovary  Comments:  3.6 cm - noted on ultrasound - again meeting with gyn onc next month    Gallbladder polyp   Noted     Perimenopausal  Comments:  FSH is 4.6 so still premenopausal! cycle   on provera high dose - will follow up with gynecology onc next month 11/14  Orders:  -     Follicle Stimulating Hormone; Future; Expected date: 10/20/2022

## 2022-10-28 ENCOUNTER — PATIENT MESSAGE (OUTPATIENT)
Dept: INTERNAL MEDICINE | Facility: CLINIC | Age: 52
End: 2022-10-28
Payer: COMMERCIAL

## 2022-10-30 ENCOUNTER — PATIENT MESSAGE (OUTPATIENT)
Dept: INTERNAL MEDICINE | Facility: CLINIC | Age: 52
End: 2022-10-30
Payer: COMMERCIAL

## 2022-10-30 DIAGNOSIS — I48.91 ATRIAL FIBRILLATION WITH RVR: ICD-10-CM

## 2022-10-30 RX ORDER — LOSARTAN POTASSIUM 25 MG/1
25 TABLET ORAL DAILY
Qty: 90 TABLET | Refills: 1 | Status: SHIPPED | OUTPATIENT
Start: 2022-10-30 | End: 2022-11-01 | Stop reason: SDUPTHER

## 2022-10-30 RX ORDER — AMIODARONE HYDROCHLORIDE 200 MG/1
200 TABLET ORAL DAILY
Qty: 90 TABLET | Refills: 1 | Status: SHIPPED | OUTPATIENT
Start: 2022-10-30 | End: 2022-11-01 | Stop reason: SDUPTHER

## 2022-10-30 RX ORDER — FUROSEMIDE 20 MG/1
20 TABLET ORAL DAILY PRN
COMMUNITY
End: 2022-11-01 | Stop reason: SDUPTHER

## 2022-10-30 RX ORDER — PROGESTERONE 100 MG/1
100 CAPSULE ORAL DAILY
Qty: 30 CAPSULE | Refills: 1 | Status: SHIPPED | OUTPATIENT
Start: 2022-10-30 | End: 2022-11-01 | Stop reason: SDUPTHER

## 2022-10-31 ENCOUNTER — PATIENT MESSAGE (OUTPATIENT)
Dept: ADMINISTRATIVE | Facility: HOSPITAL | Age: 52
End: 2022-10-31
Payer: COMMERCIAL

## 2022-11-01 DIAGNOSIS — I48.91 ATRIAL FIBRILLATION WITH RVR: ICD-10-CM

## 2022-11-01 RX ORDER — PANTOPRAZOLE SODIUM 40 MG/1
40 TABLET, DELAYED RELEASE ORAL DAILY
Qty: 30 TABLET | Refills: 0 | Status: SHIPPED | OUTPATIENT
Start: 2022-11-01 | End: 2022-11-30

## 2022-11-01 RX ORDER — FUROSEMIDE 20 MG/1
20 TABLET ORAL DAILY PRN
Qty: 30 TABLET | Refills: 0 | Status: SHIPPED | OUTPATIENT
Start: 2022-11-01

## 2022-11-01 RX ORDER — ATORVASTATIN CALCIUM 40 MG/1
40 TABLET, FILM COATED ORAL DAILY
COMMUNITY
Start: 2022-10-29 | End: 2023-05-25

## 2022-11-01 RX ORDER — LOSARTAN POTASSIUM 25 MG/1
25 TABLET ORAL DAILY
Qty: 90 TABLET | Refills: 0 | Status: SHIPPED | OUTPATIENT
Start: 2022-11-01 | End: 2023-05-26

## 2022-11-01 RX ORDER — AMIODARONE HYDROCHLORIDE 200 MG/1
200 TABLET ORAL DAILY
Qty: 30 TABLET | Refills: 1 | Status: SHIPPED | OUTPATIENT
Start: 2022-11-01 | End: 2023-01-15

## 2022-11-01 RX ORDER — MEDROXYPROGESTERONE ACETATE 10 MG/1
10 TABLET ORAL DAILY
Qty: 30 TABLET | Refills: 3 | OUTPATIENT
Start: 2022-11-01

## 2022-11-01 RX ORDER — PROGESTERONE 100 MG/1
100 CAPSULE ORAL DAILY
Qty: 30 CAPSULE | Refills: 1 | Status: SHIPPED | OUTPATIENT
Start: 2022-11-01 | End: 2023-02-20

## 2022-11-01 RX ORDER — ATORVASTATIN CALCIUM 40 MG/1
40 TABLET, FILM COATED ORAL DAILY
OUTPATIENT
Start: 2022-11-01

## 2022-11-01 NOTE — TELEPHONE ENCOUNTER
No new care gaps identified.  [facility] Embedded Care Gaps. Reference number: [id number]. 11/01/2022   8:19:18 AM CDT

## 2022-11-14 ENCOUNTER — OFFICE VISIT (OUTPATIENT)
Dept: GYNECOLOGIC ONCOLOGY | Facility: CLINIC | Age: 52
End: 2022-11-14
Payer: COMMERCIAL

## 2022-11-14 VITALS
WEIGHT: 173 LBS | HEART RATE: 58 BPM | DIASTOLIC BLOOD PRESSURE: 61 MMHG | SYSTOLIC BLOOD PRESSURE: 139 MMHG | HEIGHT: 64 IN | BODY MASS INDEX: 29.53 KG/M2

## 2022-11-14 DIAGNOSIS — D25.1 INTRAMURAL LEIOMYOMA OF UTERUS: ICD-10-CM

## 2022-11-14 DIAGNOSIS — Z86.73 HISTORY OF CEREBROVASCULAR ACCIDENT (CVA) FROM LEFT CAROTID ARTERY OCCLUSION INVOLVING LEFT MIDDLE CEREBRAL ARTERY TERRITORY: ICD-10-CM

## 2022-11-14 DIAGNOSIS — N93.8 DYSFUNCTIONAL UTERINE BLEEDING: Primary | ICD-10-CM

## 2022-11-14 PROCEDURE — 3044F PR MOST RECENT HEMOGLOBIN A1C LEVEL <7.0%: ICD-10-PCS | Mod: CPTII,S$GLB,, | Performed by: OBSTETRICS & GYNECOLOGY

## 2022-11-14 PROCEDURE — 4010F ACE/ARB THERAPY RXD/TAKEN: CPT | Mod: CPTII,S$GLB,, | Performed by: OBSTETRICS & GYNECOLOGY

## 2022-11-14 PROCEDURE — 1160F RVW MEDS BY RX/DR IN RCRD: CPT | Mod: CPTII,S$GLB,, | Performed by: OBSTETRICS & GYNECOLOGY

## 2022-11-14 PROCEDURE — 99214 PR OFFICE/OUTPT VISIT, EST, LEVL IV, 30-39 MIN: ICD-10-PCS | Mod: S$GLB,,, | Performed by: OBSTETRICS & GYNECOLOGY

## 2022-11-14 PROCEDURE — 99999 PR PBB SHADOW E&M-EST. PATIENT-LVL III: ICD-10-PCS | Mod: PBBFAC,,, | Performed by: OBSTETRICS & GYNECOLOGY

## 2022-11-14 PROCEDURE — 3044F HG A1C LEVEL LT 7.0%: CPT | Mod: CPTII,S$GLB,, | Performed by: OBSTETRICS & GYNECOLOGY

## 2022-11-14 PROCEDURE — 3078F PR MOST RECENT DIASTOLIC BLOOD PRESSURE < 80 MM HG: ICD-10-PCS | Mod: CPTII,S$GLB,, | Performed by: OBSTETRICS & GYNECOLOGY

## 2022-11-14 PROCEDURE — 1159F PR MEDICATION LIST DOCUMENTED IN MEDICAL RECORD: ICD-10-PCS | Mod: CPTII,S$GLB,, | Performed by: OBSTETRICS & GYNECOLOGY

## 2022-11-14 PROCEDURE — 1160F PR REVIEW ALL MEDS BY PRESCRIBER/CLIN PHARMACIST DOCUMENTED: ICD-10-PCS | Mod: CPTII,S$GLB,, | Performed by: OBSTETRICS & GYNECOLOGY

## 2022-11-14 PROCEDURE — 3008F PR BODY MASS INDEX (BMI) DOCUMENTED: ICD-10-PCS | Mod: CPTII,S$GLB,, | Performed by: OBSTETRICS & GYNECOLOGY

## 2022-11-14 PROCEDURE — 3075F SYST BP GE 130 - 139MM HG: CPT | Mod: CPTII,S$GLB,, | Performed by: OBSTETRICS & GYNECOLOGY

## 2022-11-14 PROCEDURE — 99999 PR PBB SHADOW E&M-EST. PATIENT-LVL III: CPT | Mod: PBBFAC,,, | Performed by: OBSTETRICS & GYNECOLOGY

## 2022-11-14 PROCEDURE — 3078F DIAST BP <80 MM HG: CPT | Mod: CPTII,S$GLB,, | Performed by: OBSTETRICS & GYNECOLOGY

## 2022-11-14 PROCEDURE — 3075F PR MOST RECENT SYSTOLIC BLOOD PRESS GE 130-139MM HG: ICD-10-PCS | Mod: CPTII,S$GLB,, | Performed by: OBSTETRICS & GYNECOLOGY

## 2022-11-14 PROCEDURE — 1159F MED LIST DOCD IN RCRD: CPT | Mod: CPTII,S$GLB,, | Performed by: OBSTETRICS & GYNECOLOGY

## 2022-11-14 PROCEDURE — 4010F PR ACE/ARB THEARPY RXD/TAKEN: ICD-10-PCS | Mod: CPTII,S$GLB,, | Performed by: OBSTETRICS & GYNECOLOGY

## 2022-11-14 PROCEDURE — 3008F BODY MASS INDEX DOCD: CPT | Mod: CPTII,S$GLB,, | Performed by: OBSTETRICS & GYNECOLOGY

## 2022-11-14 PROCEDURE — 99214 OFFICE O/P EST MOD 30 MIN: CPT | Mod: S$GLB,,, | Performed by: OBSTETRICS & GYNECOLOGY

## 2022-11-14 NOTE — PROGRESS NOTES
Subjective:      Patient ID: Maria Dolores Tamayo is a 52 y.o. female.    Chief Complaint: Follow-up      HPI  Here today for f/u of DUB and fibroids.  Was having swelling on Provera, so switched to Prometrium 100 mg and has done better.  Has had just scant spotting.  Underwent ablation last month and is on amioderone.  No new complaints.  Review of Systems   Constitutional:  Negative for activity change, appetite change, chills, fatigue and fever.   HENT:  Negative for hearing loss, mouth sores, nosebleeds, sore throat and tinnitus.    Eyes:  Negative for visual disturbance.   Respiratory:  Negative for cough, chest tightness, shortness of breath and wheezing.    Cardiovascular:  Negative for chest pain and leg swelling.   Gastrointestinal:  Negative for abdominal distention, abdominal pain, blood in stool, constipation, diarrhea, nausea and vomiting.   Genitourinary:  Negative for dysuria, flank pain, frequency, hematuria, pelvic pain, vaginal bleeding, vaginal discharge and vaginal pain.   Musculoskeletal:  Negative for arthralgias and back pain.   Skin:  Negative for rash.   Neurological:  Negative for dizziness, seizures, syncope, weakness and numbness.   Hematological:  Does not bruise/bleed easily.   Psychiatric/Behavioral:  Negative for confusion and sleep disturbance. The patient is not nervous/anxious.      Objective:   Physical Exam:   Constitutional: She is oriented to person, place, and time. She appears well-developed and well-nourished. No distress.    HENT:   Head: Normocephalic and atraumatic.    Eyes: No scleral icterus.     Cardiovascular:       Exam reveals no cyanosis and no edema.        Pulmonary/Chest: Effort normal. No respiratory distress.        Abdominal: Soft. She exhibits no distension and no fluid wave. There is no abdominal tenderness. There is no rigidity.     Genitourinary:    Uterus normal.      Pelvic exam was performed with patient supine.   There is no rash, tenderness or lesion  on the right labia. There is no rash, tenderness or lesion on the left labia. Cervix is normal. Right adnexum displays no mass, no tenderness and no fullness. Left adnexum displays no mass, no tenderness and no fullness. There is bleeding (scant w discharge) in the vagina. No  no vaginal discharge in the vagina. Uterus is not enlarged, not fixed, not tender and not hosting fibroids.           Musculoskeletal: Normal range of motion and moves all extremeties. No edema.       Neurological: She is alert and oriented to person, place, and time.    Skin: Skin is warm. No rash noted. No cyanosis or erythema.    Psychiatric: She has a normal mood and affect. Thought content normal.     Assessment:     1. Dysfunctional uterine bleeding    2. Intramural leiomyoma of uterus    3. History of cerebrovascular accident (CVA) from left carotid artery occlusion involving left middle cerebral artery territory        Plan:       Had successful ablation.  Seeing EP in January at which poin they will take her off of the Amioderone.  Will see me back in Feb to discuss timing of surgery.  Continue progesterone

## 2023-01-13 ENCOUNTER — HOSPITAL ENCOUNTER (OUTPATIENT)
Dept: CARDIOLOGY | Facility: CLINIC | Age: 53
Discharge: HOME | End: 2023-01-13
Payer: COMMERCIAL

## 2023-01-13 ENCOUNTER — OFFICE VISIT (OUTPATIENT)
Dept: ELECTROPHYSIOLOGY | Facility: CLINIC | Age: 53
End: 2023-01-13
Payer: COMMERCIAL

## 2023-01-13 VITALS
DIASTOLIC BLOOD PRESSURE: 38 MMHG | BODY MASS INDEX: 30.3 KG/M2 | HEART RATE: 58 BPM | HEIGHT: 64 IN | WEIGHT: 177.5 LBS | SYSTOLIC BLOOD PRESSURE: 150 MMHG

## 2023-01-13 DIAGNOSIS — Z86.73 HISTORY OF CEREBROVASCULAR ACCIDENT (CVA) FROM LEFT CAROTID ARTERY OCCLUSION INVOLVING LEFT MIDDLE CEREBRAL ARTERY TERRITORY: ICD-10-CM

## 2023-01-13 DIAGNOSIS — Z79.01 CURRENT USE OF LONG TERM ANTICOAGULATION: ICD-10-CM

## 2023-01-13 DIAGNOSIS — Z98.890 H/O CARDIAC RADIOFREQUENCY ABLATION: ICD-10-CM

## 2023-01-13 DIAGNOSIS — I49.8 OTHER SPECIFIED CARDIAC ARRHYTHMIAS: ICD-10-CM

## 2023-01-13 DIAGNOSIS — I50.42 CHRONIC COMBINED SYSTOLIC AND DIASTOLIC HEART FAILURE: ICD-10-CM

## 2023-01-13 DIAGNOSIS — Z86.79 HX OF MYOCARDITIS: ICD-10-CM

## 2023-01-13 DIAGNOSIS — K57.92 DIVERTICULITIS: ICD-10-CM

## 2023-01-13 DIAGNOSIS — I63.40 CEREBROVASCULAR ACCIDENT (CVA) DUE TO EMBOLISM OF CEREBRAL ARTERY: ICD-10-CM

## 2023-01-13 DIAGNOSIS — I48.91 ATRIAL FIBRILLATION WITH RVR: ICD-10-CM

## 2023-01-13 DIAGNOSIS — I42.8 NONISCHEMIC CARDIOMYOPATHY: ICD-10-CM

## 2023-01-13 DIAGNOSIS — E66.3 OVERWEIGHT (BMI 25.0-29.9): ICD-10-CM

## 2023-01-13 DIAGNOSIS — D25.9 UTERINE LEIOMYOMA, UNSPECIFIED LOCATION: ICD-10-CM

## 2023-01-13 DIAGNOSIS — I48.0 PAROXYSMAL ATRIAL FIBRILLATION: Primary | ICD-10-CM

## 2023-01-13 PROCEDURE — 3077F SYST BP >= 140 MM HG: CPT | Mod: CPTII,S$GLB,, | Performed by: STUDENT IN AN ORGANIZED HEALTH CARE EDUCATION/TRAINING PROGRAM

## 2023-01-13 PROCEDURE — 1159F MED LIST DOCD IN RCRD: CPT | Mod: CPTII,S$GLB,, | Performed by: STUDENT IN AN ORGANIZED HEALTH CARE EDUCATION/TRAINING PROGRAM

## 2023-01-13 PROCEDURE — 99999 PR PBB SHADOW E&M-EST. PATIENT-LVL III: CPT | Mod: PBBFAC,,, | Performed by: STUDENT IN AN ORGANIZED HEALTH CARE EDUCATION/TRAINING PROGRAM

## 2023-01-13 PROCEDURE — 3008F BODY MASS INDEX DOCD: CPT | Mod: CPTII,S$GLB,, | Performed by: STUDENT IN AN ORGANIZED HEALTH CARE EDUCATION/TRAINING PROGRAM

## 2023-01-13 PROCEDURE — 3008F PR BODY MASS INDEX (BMI) DOCUMENTED: ICD-10-PCS | Mod: CPTII,S$GLB,, | Performed by: STUDENT IN AN ORGANIZED HEALTH CARE EDUCATION/TRAINING PROGRAM

## 2023-01-13 PROCEDURE — 93005 RHYTHM STRIP: ICD-10-PCS | Mod: S$GLB,,, | Performed by: STUDENT IN AN ORGANIZED HEALTH CARE EDUCATION/TRAINING PROGRAM

## 2023-01-13 PROCEDURE — 99214 OFFICE O/P EST MOD 30 MIN: CPT | Mod: S$GLB,,, | Performed by: STUDENT IN AN ORGANIZED HEALTH CARE EDUCATION/TRAINING PROGRAM

## 2023-01-13 PROCEDURE — 3078F PR MOST RECENT DIASTOLIC BLOOD PRESSURE < 80 MM HG: ICD-10-PCS | Mod: CPTII,S$GLB,, | Performed by: STUDENT IN AN ORGANIZED HEALTH CARE EDUCATION/TRAINING PROGRAM

## 2023-01-13 PROCEDURE — 93010 ELECTROCARDIOGRAM REPORT: CPT | Mod: S$GLB,,, | Performed by: INTERNAL MEDICINE

## 2023-01-13 PROCEDURE — 93005 ELECTROCARDIOGRAM TRACING: CPT | Mod: S$GLB,,, | Performed by: STUDENT IN AN ORGANIZED HEALTH CARE EDUCATION/TRAINING PROGRAM

## 2023-01-13 PROCEDURE — 99214 PR OFFICE/OUTPT VISIT, EST, LEVL IV, 30-39 MIN: ICD-10-PCS | Mod: S$GLB,,, | Performed by: STUDENT IN AN ORGANIZED HEALTH CARE EDUCATION/TRAINING PROGRAM

## 2023-01-13 PROCEDURE — 3077F PR MOST RECENT SYSTOLIC BLOOD PRESSURE >= 140 MM HG: ICD-10-PCS | Mod: CPTII,S$GLB,, | Performed by: STUDENT IN AN ORGANIZED HEALTH CARE EDUCATION/TRAINING PROGRAM

## 2023-01-13 PROCEDURE — 1159F PR MEDICATION LIST DOCUMENTED IN MEDICAL RECORD: ICD-10-PCS | Mod: CPTII,S$GLB,, | Performed by: STUDENT IN AN ORGANIZED HEALTH CARE EDUCATION/TRAINING PROGRAM

## 2023-01-13 PROCEDURE — 3078F DIAST BP <80 MM HG: CPT | Mod: CPTII,S$GLB,, | Performed by: STUDENT IN AN ORGANIZED HEALTH CARE EDUCATION/TRAINING PROGRAM

## 2023-01-13 PROCEDURE — 93010 RHYTHM STRIP: ICD-10-PCS | Mod: S$GLB,,, | Performed by: INTERNAL MEDICINE

## 2023-01-13 PROCEDURE — 99999 PR PBB SHADOW E&M-EST. PATIENT-LVL III: ICD-10-PCS | Mod: PBBFAC,,, | Performed by: STUDENT IN AN ORGANIZED HEALTH CARE EDUCATION/TRAINING PROGRAM

## 2023-01-13 RX ORDER — TIRZEPATIDE 2.5 MG/.5ML
INJECTION, SOLUTION SUBCUTANEOUS
COMMUNITY
Start: 2023-01-11 | End: 2023-06-14 | Stop reason: DRUGHIGH

## 2023-01-13 NOTE — PROGRESS NOTES
Electrophysiology Clinic Note    Reason for follow-up patient visit: Ongoing evaluation and recommendations regarding paroxysmal atrial fibrillation, s/p successful radiofrequency catheter ablation with pulmonary vein isolation on 10/5/2022.     PRESENTING HISTORY:     History of Present Illness:  Ms. Maria Dolores Tamayo is a alisa 52-year-old woman who returns to clinic today for ongoing evaluation and recommendations regarding paroxysmal atrial fibrillation, s/p successful radiofrequency catheter ablation with pulmonary vein isolation on 10/5/2022. 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 She was previously admitted from 4/22-25/2022 from her rehabilitation center with several alerts from her LifeVest, consistent with multiple events of atrial fibrillation with RVR. She returns to clinic today for ongoing evaluation following her successful radiofrequency catheter ablation with pulmonary vein isolation.     In discussion with Ms. Tamayo, she tells me that she is feeling overall quite well and has continued to recover following her recent ablation. She denies any recurrent symptoms of palpitations following her ablation. She remains on amiodarone 200mg po daily, and ideally would be able to discontinue this agent, as she reports vivid dreams and fatigue on this medication. She has had recovery of her systolic function, with most recent LVEF 55%. She has been vigilant to wear her Apple watch, and denies any further episodes of atrial fibrillation on her Apple watch. She has not experienced any symptoms similar to her previous palpitations with her prior  episodes of atrial fibrillation. She is very vigilant to continue her apixaban 2.5mg po BID. Her dose was decreased from 5mg po BID following significant uterine bleeding, and she denies any subsequent events of bleeding since reducing her dose of apixaban. She has no residual deficits following her prior stroke, although she reports feeling increased word searching. She is planning to travel to visit her parents in the near future, as they are aging with periods of poor health. She remains in sinus rhythm on all subsequent ECGs, with no evidence of prolonged QT, nonsustained or sustained VT. 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 I called her outside hospital in an effort to obtain strips of her prior episode of WCT. Unfortunately, this event occurred in the CT scanner and no strips or ECGs were recorded. Currently, she has no indication for ICD implantation. We previously discussed obtaining a cardiac MRI in order to further assess for possible VT substrate in the future; however, as she has not had any subsequent events of arrhythmia following her successful ablation, we will continue to monitor at this time.     She denies any episodes of dizziness, lightheadedness, syncope/presyncope, chest pain or chest discomfort, palpitations, nausea or vomiting, orthopnea, lower extremity edema, or PND. She reports baseline mild shortness of breath and dyspnea with exertion that she feels has remained stable for her. She reports that she is under a significant amount of stress in anticipation of [place], and notes that her blood pressure was higher today than it usually is - likely secondary to making sure costumes for her [employer] are in order. She can climb more than one flight of stairs prior to needing to take a break and remains very physically active without reported limitation.     Review of Systems:  Review of Systems   Constitutional:  Negative for  activity change.   HENT:  Negative for nasal congestion, nosebleeds, postnasal drip, rhinorrhea, sinus pressure/congestion, sneezing and sore throat.    Eyes:  Negative for visual disturbance.   Respiratory:  Positive for shortness of breath. Negative for apnea, cough, chest tightness and wheezing.    Cardiovascular:  Negative for chest pain, palpitations, leg swelling and claudication.   Gastrointestinal:  Negative for abdominal distention, abdominal pain, blood in stool, change in bowel habit, constipation, diarrhea, nausea, vomiting and change in bowel habit.   Genitourinary:  Negative for dysuria and hematuria.   Musculoskeletal:  Negative for gait problem.   Neurological:  Negative for dizziness, seizures, syncope, weakness, light-headedness, headaches, coordination difficulties and coordination difficulties.   Psychiatric/Behavioral:  Positive for sleep disturbance.       PAST HISTORY:     Past Medical History:   Diagnosis Date    Abnormal Pap smear of cervix     Anticoagulant long-term use     CHF (congestive heart failure)     Coronary artery disease     Encounter for blood transfusion     Heart murmur     atrial fibrillation    Myocardial infarction     Stroke        Past Surgical History:   Procedure Laterality Date    ABLATION OF ARRHYTHMOGENIC FOCUS FOR ATRIAL FIBRILLATION N/A 10/5/2022    Procedure: Ablation atrial fibrillation;  Surgeon: PLACIDO Cervantes MD;  Location: [facility] EP LAB;  Service: Cardiology;  Laterality: N/A;  AF, ROWDY, PVI, RFA, Carto, Gen, EH, 3 Prep    AUGMENTATION OF BREAST      COSMETIC SURGERY      UTERINE ARTERY EMBOLIZATION  2000       Family History:  Family History   Problem Relation Age of Onset    Progressive Supranuclear Palsy Mother 73    Cancer Maternal Aunt         ovarian    Ovarian cancer Maternal Aunt 60    Diabetes Paternal Grandmother         prediabetic    Breast cancer Neg Hx     Colon cancer Neg Hx     Uterine cancer Neg Hx        Social History:  She  reports that  "she has never smoked. She has never used smokeless tobacco. She reports that she does not currently use alcohol. She reports that she does not use drugs.      MEDICATIONS & ALLERGIES:     Review of patient's allergies indicates:  No Known Allergies    Current Outpatient Medications on File Prior to Visit   Medication Sig Dispense Refill    amiodarone (PACERONE) 200 MG Tab Take 1 tablet (200 mg total) by mouth once daily. 30 tablet 1    apixaban (ELIQUIS) 2.5 mg Tab Take 1 tablet (2.5 mg total) by mouth 2 (two) times daily. 60 tablet 4    atorvastatin (LIPITOR) 40 MG tablet Take 40 mg by mouth once daily.      furosemide (LASIX) 20 MG tablet Take 1 tablet (20 mg total) by mouth daily as needed. edema 30 tablet 0    losartan (COZAAR) 25 MG tablet Take 1 tablet (25 mg total) by mouth once daily. 90 tablet 0    medroxyPROGESTERone (PROVERA) 10 MG tablet TAKE 1 TABLET BY MOUTH EVERY DAY 30 tablet 3    pantoprazole (PROTONIX) 40 MG tablet TAKE 1 TABLET BY MOUTH EVERY DAY 30 tablet 0    progesterone (PROMETRIUM) 100 MG capsule Take 1 capsule (100 mg total) by mouth once daily. 30 capsule 1     No current facility-administered medications on file prior to visit.        OBJECTIVE:     Vital Signs:  BP (!) 150/38   Pulse (!) 58   Ht 5' 4" (1.626 m)   Wt 80.5 kg (177 lb 7.5 oz)   BMI 30.46 kg/m²     Physical Exam:  Physical Exam  Constitutional:       General: She is not in acute distress.     Appearance: Normal appearance. She is not ill-appearing or diaphoretic.      Comments: Well-appearing woman in NAD.   HENT:      Head: Normocephalic and atraumatic.      Comments: Mask worn in clinic in the setting of COVID precautions.   Eyes:      Pupils: Pupils are equal, round, and reactive to light.   Cardiovascular:      Rate and Rhythm: Regular rhythm. Bradycardia present.      Pulses: Normal pulses.      Heart sounds: Normal heart sounds. No murmur heard.    No friction rub. No gallop.   Pulmonary:      Effort: Pulmonary " effort is normal. No respiratory distress.      Breath sounds: Normal breath sounds. No wheezing, rhonchi or rales.   Chest:      Chest wall: No tenderness.   Abdominal:      General: There is no distension.      Palpations: Abdomen is soft.      Tenderness: There is no abdominal tenderness.   Musculoskeletal:         General: No swelling or tenderness.      Cervical back: Normal range of motion.      Right lower leg: No edema.      Left lower leg: No edema.   Skin:     General: Skin is warm and dry.      Findings: No erythema, lesion or rash.   Neurological:      General: No focal deficit present.      Mental Status: She is alert and oriented to person, place, and time. Mental status is at baseline.      Motor: No weakness.      Gait: Gait normal.   Psychiatric:         Mood and Affect: Mood normal.         Behavior: Behavior normal.      Laboratory Data:  Lab Results   Component Value Date    WBC 9.22 10/20/2022    HGB 9.7 (L) 10/20/2022    HCT 33.2 (L) 10/20/2022    MCV 77 (L) 10/20/2022     10/20/2022     Lab Results   Component Value Date    GLU 99 10/20/2022     10/20/2022    K 3.8 10/20/2022     10/20/2022    CO2 23 10/20/2022    BUN 16 10/20/2022    CREATININE 0.9 10/20/2022    CALCIUM 9.8 10/20/2022    MG 2.0 07/30/2022     Lab Results   Component Value Date    INR 1.0 09/20/2022       Pertinent Cardiac Data:  ECG: Sinus bradycardia with rate of 58 bpm,  ms, QRS 84 ms, QT/QTc 468/459 ms.     Resting 2D Transthoracic Echocardiogram - 4/23/2022:  The left ventricle is normal in size with eccentric hypertrophy and normal systolic function. The estimated ejection fraction is 55%.  Normal right ventricular size with normal right ventricular systolic function.  Mild left atrial enlargement.  Normal central venous pressure (3 mmHg).  Atrial fibrillation present throughout the examination.     EPS with RFA PVI - 10/5/2022:    Successful radiofrequency ablation with pulmonary vein isolation  (PVI).    3D mapping performed with Carto 3.    Electrophysiology study with coronary sinus pacing.    His bundle recording.    Intracardiac echo.      ASSESSMENT & PLAN:   Ms. Maria Dolores Tamayo is a alisa 52-year-old woman who returns to clinic today for ongoing evaluation and recommendations regarding paroxysmal atrial fibrillation, s/p successful radiofrequency catheter ablation with pulmonary vein isolation on 10/5/2022. 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 She was previously admitted from 4/22-25/2022 from her rehabilitation center with several alerts from her LifeVest, consistent with multiple events of atrial fibrillation with RVR. She returns to clinic today for ongoing evaluation following her successful radiofrequency catheter ablation with pulmonary vein isolation.     - We continue to discuss the pathophysiology of atrial fibrillation; specifically, we discussed paroxysmal atrial fibrillation and the concept that some patients may experience paroxysms of atrial fibrillation interrupting periods of sinus rhythm. We discussed that the biggest risk associated with atrial fibrillation is an increased risk of CVA.  - Following her successful pulmonary vein isolation procedure she denies any subsequent episodes of atrial fibrillation on both symptom review and on her Apple watch recordings. We will obtain a 48-hour Holter monitor to assess for any events of recurrent atrial fibrillation.   - She remains on amiodarone 200mg po daily. She reports vivid dreams and increased fatigue on this agent, and as she is now beyond the blanking period  "following her successful PVI, we will discontinue this agent.  - Her [id number] is 3 (prior CVA, female gender, age less than 64), portending an annual adjusted risk of CVA of 3.2%. She remains on uninterrupted apixaban therapy with no bleeding events reported.   - She has recovery of her systolic function with no recorded VT. My suspicion was that her prior episode of reported "wide complex tachycardia" was actually aberrant atrial fibrillation with RVR at OSH rather than true VT - this event occurred in a CT scanner with no tracings available to confirm. We discussed obtaining a cardiac MRI following her ablation for complete assessment of LGE for risk stratification for potential VT; however, she has not had any subsequent palpitations, and she would prefer to defer this imaging at this time. We will obtain ambulatory monitoring as above.  - Possible underlying drivers of atrial fibrillation were addressed at this appointment, including recommendations for maintenance of a healthy BMI - now a class I recommendation. Review of laboratory data revealed acceptable TSH at 2.195. She denies any loud snoring or excessive daytime sleepiness.    - She will continue to follow with her PCP and neurology for ongoing management of her comorbid conditions.     This patient will return to clinic with me in four months with a 48-hour Holter monitor in the interim following cessation of amiodarone therapy. She will remain on apixaban oral anticoagulation. All questions and concerns were addressed at this encounter.     Signing Physician:       DEZ Cervantes MD  Electrophysiology Attending        "

## 2023-01-15 PROBLEM — I48.0 PAROXYSMAL ATRIAL FIBRILLATION: Status: ACTIVE | Noted: 2022-07-29

## 2023-01-15 PROBLEM — Z98.890 H/O CARDIAC RADIOFREQUENCY ABLATION: Status: ACTIVE | Noted: 2023-01-15

## 2023-02-22 ENCOUNTER — CLINICAL SUPPORT (OUTPATIENT)
Dept: CARDIOLOGY | Facility: HOSPITAL | Age: 53
End: 2023-02-22
Attending: STUDENT IN AN ORGANIZED HEALTH CARE EDUCATION/TRAINING PROGRAM
Payer: COMMERCIAL

## 2023-02-22 DIAGNOSIS — I48.0 PAROXYSMAL ATRIAL FIBRILLATION: ICD-10-CM

## 2023-02-22 PROCEDURE — 93227 XTRNL ECG REC<48 HR R&I: CPT | Mod: ,,, | Performed by: STUDENT IN AN ORGANIZED HEALTH CARE EDUCATION/TRAINING PROGRAM

## 2023-02-22 PROCEDURE — 93227 HOLTER MONITOR - 48 HOUR (CUPID ONLY): ICD-10-PCS | Mod: ,,, | Performed by: STUDENT IN AN ORGANIZED HEALTH CARE EDUCATION/TRAINING PROGRAM

## 2023-02-22 PROCEDURE — 93226 XTRNL ECG REC<48 HR SCAN A/R: CPT

## 2023-02-27 ENCOUNTER — PATIENT MESSAGE (OUTPATIENT)
Dept: OBSTETRICS AND GYNECOLOGY | Facility: CLINIC | Age: 53
End: 2023-02-27
Payer: COMMERCIAL

## 2023-02-27 ENCOUNTER — OFFICE VISIT (OUTPATIENT)
Dept: GYNECOLOGIC ONCOLOGY | Facility: CLINIC | Age: 53
End: 2023-02-27
Payer: COMMERCIAL

## 2023-02-27 ENCOUNTER — TELEPHONE (OUTPATIENT)
Dept: OBSTETRICS AND GYNECOLOGY | Facility: CLINIC | Age: 53
End: 2023-02-27
Payer: COMMERCIAL

## 2023-02-27 VITALS
DIASTOLIC BLOOD PRESSURE: 63 MMHG | WEIGHT: 165.81 LBS | BODY MASS INDEX: 28.46 KG/M2 | HEART RATE: 59 BPM | SYSTOLIC BLOOD PRESSURE: 128 MMHG

## 2023-02-27 DIAGNOSIS — N93.8 DYSFUNCTIONAL UTERINE BLEEDING: ICD-10-CM

## 2023-02-27 DIAGNOSIS — D25.0 INTRAMURAL, SUBMUCOUS, AND SUBSEROUS LEIOMYOMA OF UTERUS: Primary | ICD-10-CM

## 2023-02-27 DIAGNOSIS — D25.2 INTRAMURAL, SUBMUCOUS, AND SUBSEROUS LEIOMYOMA OF UTERUS: Primary | ICD-10-CM

## 2023-02-27 DIAGNOSIS — Z12.31 ENCOUNTER FOR SCREENING MAMMOGRAM FOR MALIGNANT NEOPLASM OF BREAST: Primary | ICD-10-CM

## 2023-02-27 DIAGNOSIS — D25.1 INTRAMURAL, SUBMUCOUS, AND SUBSEROUS LEIOMYOMA OF UTERUS: Primary | ICD-10-CM

## 2023-02-27 PROCEDURE — 99214 OFFICE O/P EST MOD 30 MIN: CPT | Mod: S$GLB,,, | Performed by: OBSTETRICS & GYNECOLOGY

## 2023-02-27 PROCEDURE — 3008F PR BODY MASS INDEX (BMI) DOCUMENTED: ICD-10-PCS | Mod: CPTII,S$GLB,, | Performed by: OBSTETRICS & GYNECOLOGY

## 2023-02-27 PROCEDURE — 3008F BODY MASS INDEX DOCD: CPT | Mod: CPTII,S$GLB,, | Performed by: OBSTETRICS & GYNECOLOGY

## 2023-02-27 PROCEDURE — 1159F MED LIST DOCD IN RCRD: CPT | Mod: CPTII,S$GLB,, | Performed by: OBSTETRICS & GYNECOLOGY

## 2023-02-27 PROCEDURE — 1160F PR REVIEW ALL MEDS BY PRESCRIBER/CLIN PHARMACIST DOCUMENTED: ICD-10-PCS | Mod: CPTII,S$GLB,, | Performed by: OBSTETRICS & GYNECOLOGY

## 2023-02-27 PROCEDURE — 99999 PR PBB SHADOW E&M-EST. PATIENT-LVL III: CPT | Mod: PBBFAC,,, | Performed by: OBSTETRICS & GYNECOLOGY

## 2023-02-27 PROCEDURE — 3074F PR MOST RECENT SYSTOLIC BLOOD PRESSURE < 130 MM HG: ICD-10-PCS | Mod: CPTII,S$GLB,, | Performed by: OBSTETRICS & GYNECOLOGY

## 2023-02-27 PROCEDURE — 1160F RVW MEDS BY RX/DR IN RCRD: CPT | Mod: CPTII,S$GLB,, | Performed by: OBSTETRICS & GYNECOLOGY

## 2023-02-27 PROCEDURE — 99214 PR OFFICE/OUTPT VISIT, EST, LEVL IV, 30-39 MIN: ICD-10-PCS | Mod: S$GLB,,, | Performed by: OBSTETRICS & GYNECOLOGY

## 2023-02-27 PROCEDURE — 3078F PR MOST RECENT DIASTOLIC BLOOD PRESSURE < 80 MM HG: ICD-10-PCS | Mod: CPTII,S$GLB,, | Performed by: OBSTETRICS & GYNECOLOGY

## 2023-02-27 PROCEDURE — 4010F PR ACE/ARB THEARPY RXD/TAKEN: ICD-10-PCS | Mod: CPTII,S$GLB,, | Performed by: OBSTETRICS & GYNECOLOGY

## 2023-02-27 PROCEDURE — 4010F ACE/ARB THERAPY RXD/TAKEN: CPT | Mod: CPTII,S$GLB,, | Performed by: OBSTETRICS & GYNECOLOGY

## 2023-02-27 PROCEDURE — 99999 PR PBB SHADOW E&M-EST. PATIENT-LVL III: ICD-10-PCS | Mod: PBBFAC,,, | Performed by: OBSTETRICS & GYNECOLOGY

## 2023-02-27 PROCEDURE — 3074F SYST BP LT 130 MM HG: CPT | Mod: CPTII,S$GLB,, | Performed by: OBSTETRICS & GYNECOLOGY

## 2023-02-27 PROCEDURE — 3078F DIAST BP <80 MM HG: CPT | Mod: CPTII,S$GLB,, | Performed by: OBSTETRICS & GYNECOLOGY

## 2023-02-27 PROCEDURE — 1159F PR MEDICATION LIST DOCUMENTED IN MEDICAL RECORD: ICD-10-PCS | Mod: CPTII,S$GLB,, | Performed by: OBSTETRICS & GYNECOLOGY

## 2023-02-27 NOTE — PROGRESS NOTES
Subjective:      Patient ID: Maria Dolores Tamayo is a 53 y.o. female.    Chief Complaint: Follow-up (3 month )        HPI  Here today for f/u of DUB and fibroids.  Stopped taking her prometrium a few months ago and has not had bleeding.  Had successful ablation and is now off of Amioderone.  Will need lifelong anticoagulation.  Currently asymptomatic  Review of Systems   Constitutional:  Negative for activity change, appetite change, chills, fatigue and fever.   HENT:  Negative for hearing loss, mouth sores, nosebleeds, sore throat and tinnitus.    Eyes:  Negative for visual disturbance.   Respiratory:  Negative for cough, chest tightness, shortness of breath and wheezing.    Cardiovascular:  Negative for chest pain and leg swelling.   Gastrointestinal:  Negative for abdominal distention, abdominal pain, blood in stool, constipation, diarrhea, nausea and vomiting.   Genitourinary:  Negative for dysuria, flank pain, frequency, hematuria, pelvic pain, vaginal bleeding, vaginal discharge and vaginal pain.   Musculoskeletal:  Negative for arthralgias and back pain.   Skin:  Negative for rash.   Neurological:  Negative for dizziness, seizures, syncope, weakness and numbness.   Hematological:  Does not bruise/bleed easily.   Psychiatric/Behavioral:  Negative for confusion and sleep disturbance. The patient is not nervous/anxious.      Objective:   Physical Exam:   Constitutional: She is oriented to person, place, and time. She appears well-developed and well-nourished. No distress.    HENT:   Head: Normocephalic and atraumatic.    Eyes: No scleral icterus.     Cardiovascular:       Exam reveals no cyanosis and no edema.        Pulmonary/Chest: Effort normal. No respiratory distress.        Abdominal: Soft. She exhibits no distension and no fluid wave. There is no abdominal tenderness. There is no rigidity.     Genitourinary:    Uterus normal.      Pelvic exam was performed with patient supine.   There is no rash,  tenderness or lesion on the right labia. There is no rash, tenderness or lesion on the left labia. Cervix is normal. Right adnexum displays no mass, no tenderness and no fullness. Left adnexum displays no mass, no tenderness and no fullness. There is bleeding (scant w discharge) in the vagina. No  no vaginal discharge in the vagina. Uterus is not enlarged, not fixed, not tender and not hosting fibroids.           Musculoskeletal: Normal range of motion and moves all extremeties. No edema.       Neurological: She is alert and oriented to person, place, and time.    Skin: Skin is warm. No rash noted. No cyanosis or erythema.    Psychiatric: She has a normal mood and affect. Thought content normal.     Assessment:     1. Intramural, submucous, and subserous leiomyoma of uterus        Plan:       Doing well overall.  Given lack of symptoms and no bleeding, would prefer to delay surgery as long as possible.  RTC 6 months or sooner with symptoms.

## 2023-03-06 LAB
OHS CV EVENT MONITOR DAY: 0
OHS CV HOLTER LENGTH DECIMAL HOURS: 48
OHS CV HOLTER LENGTH HOURS: 48
OHS CV HOLTER LENGTH MINUTES: 0
OHS CV HOLTER SINUS AVERAGE HR: 58
OHS CV HOLTER SINUS MAX HR: 83
OHS CV HOLTER SINUS MIN HR: 44

## 2023-05-25 ENCOUNTER — OFFICE VISIT (OUTPATIENT)
Dept: ELECTROPHYSIOLOGY | Facility: CLINIC | Age: 53
End: 2023-05-25
Payer: COMMERCIAL

## 2023-05-25 ENCOUNTER — HOSPITAL ENCOUNTER (OUTPATIENT)
Dept: CARDIOLOGY | Facility: CLINIC | Age: 53
Discharge: HOME | End: 2023-05-25
Payer: COMMERCIAL

## 2023-05-25 VITALS
HEART RATE: 62 BPM | SYSTOLIC BLOOD PRESSURE: 110 MMHG | DIASTOLIC BLOOD PRESSURE: 62 MMHG | HEIGHT: 64 IN | WEIGHT: 157.88 LBS | BODY MASS INDEX: 26.95 KG/M2

## 2023-05-25 DIAGNOSIS — I48.91 ATRIAL FIBRILLATION WITH RVR: ICD-10-CM

## 2023-05-25 DIAGNOSIS — D25.9 UTERINE LEIOMYOMA, UNSPECIFIED LOCATION: ICD-10-CM

## 2023-05-25 DIAGNOSIS — Z86.73 HISTORY OF CEREBROVASCULAR ACCIDENT (CVA) FROM LEFT CAROTID ARTERY OCCLUSION INVOLVING LEFT MIDDLE CEREBRAL ARTERY TERRITORY: ICD-10-CM

## 2023-05-25 DIAGNOSIS — I42.8 NONISCHEMIC CARDIOMYOPATHY: ICD-10-CM

## 2023-05-25 DIAGNOSIS — Z79.01 CURRENT USE OF LONG TERM ANTICOAGULATION: ICD-10-CM

## 2023-05-25 DIAGNOSIS — Z86.79 HX OF MYOCARDITIS: ICD-10-CM

## 2023-05-25 DIAGNOSIS — I48.0 PAROXYSMAL ATRIAL FIBRILLATION: Primary | ICD-10-CM

## 2023-05-25 DIAGNOSIS — Z98.890 H/O CARDIAC RADIOFREQUENCY ABLATION: ICD-10-CM

## 2023-05-25 DIAGNOSIS — I63.40 CEREBROVASCULAR ACCIDENT (CVA) DUE TO EMBOLISM OF CEREBRAL ARTERY: ICD-10-CM

## 2023-05-25 DIAGNOSIS — E66.3 OVERWEIGHT (BMI 25.0-29.9): ICD-10-CM

## 2023-05-25 DIAGNOSIS — I50.42 CHRONIC COMBINED SYSTOLIC AND DIASTOLIC HEART FAILURE: ICD-10-CM

## 2023-05-25 DIAGNOSIS — I49.8 OTHER SPECIFIED CARDIAC ARRHYTHMIAS: ICD-10-CM

## 2023-05-25 DIAGNOSIS — K57.92 DIVERTICULITIS: ICD-10-CM

## 2023-05-25 PROCEDURE — 1159F PR MEDICATION LIST DOCUMENTED IN MEDICAL RECORD: ICD-10-PCS | Mod: CPTII,S$GLB,, | Performed by: STUDENT IN AN ORGANIZED HEALTH CARE EDUCATION/TRAINING PROGRAM

## 2023-05-25 PROCEDURE — 3074F PR MOST RECENT SYSTOLIC BLOOD PRESSURE < 130 MM HG: ICD-10-PCS | Mod: CPTII,S$GLB,, | Performed by: STUDENT IN AN ORGANIZED HEALTH CARE EDUCATION/TRAINING PROGRAM

## 2023-05-25 PROCEDURE — 99214 OFFICE O/P EST MOD 30 MIN: CPT | Mod: S$GLB,,, | Performed by: STUDENT IN AN ORGANIZED HEALTH CARE EDUCATION/TRAINING PROGRAM

## 2023-05-25 PROCEDURE — 93010 RHYTHM STRIP: ICD-10-PCS | Mod: S$GLB,,, | Performed by: INTERNAL MEDICINE

## 2023-05-25 PROCEDURE — 3008F PR BODY MASS INDEX (BMI) DOCUMENTED: ICD-10-PCS | Mod: CPTII,S$GLB,, | Performed by: STUDENT IN AN ORGANIZED HEALTH CARE EDUCATION/TRAINING PROGRAM

## 2023-05-25 PROCEDURE — 3008F BODY MASS INDEX DOCD: CPT | Mod: CPTII,S$GLB,, | Performed by: STUDENT IN AN ORGANIZED HEALTH CARE EDUCATION/TRAINING PROGRAM

## 2023-05-25 PROCEDURE — 99999 PR PBB SHADOW E&M-EST. PATIENT-LVL III: ICD-10-PCS | Mod: PBBFAC,,, | Performed by: STUDENT IN AN ORGANIZED HEALTH CARE EDUCATION/TRAINING PROGRAM

## 2023-05-25 PROCEDURE — 4010F PR ACE/ARB THEARPY RXD/TAKEN: ICD-10-PCS | Mod: CPTII,S$GLB,, | Performed by: STUDENT IN AN ORGANIZED HEALTH CARE EDUCATION/TRAINING PROGRAM

## 2023-05-25 PROCEDURE — 4010F ACE/ARB THERAPY RXD/TAKEN: CPT | Mod: CPTII,S$GLB,, | Performed by: STUDENT IN AN ORGANIZED HEALTH CARE EDUCATION/TRAINING PROGRAM

## 2023-05-25 PROCEDURE — 3078F PR MOST RECENT DIASTOLIC BLOOD PRESSURE < 80 MM HG: ICD-10-PCS | Mod: CPTII,S$GLB,, | Performed by: STUDENT IN AN ORGANIZED HEALTH CARE EDUCATION/TRAINING PROGRAM

## 2023-05-25 PROCEDURE — 99999 PR PBB SHADOW E&M-EST. PATIENT-LVL III: CPT | Mod: PBBFAC,,, | Performed by: STUDENT IN AN ORGANIZED HEALTH CARE EDUCATION/TRAINING PROGRAM

## 2023-05-25 PROCEDURE — 1159F MED LIST DOCD IN RCRD: CPT | Mod: CPTII,S$GLB,, | Performed by: STUDENT IN AN ORGANIZED HEALTH CARE EDUCATION/TRAINING PROGRAM

## 2023-05-25 PROCEDURE — 3074F SYST BP LT 130 MM HG: CPT | Mod: CPTII,S$GLB,, | Performed by: STUDENT IN AN ORGANIZED HEALTH CARE EDUCATION/TRAINING PROGRAM

## 2023-05-25 PROCEDURE — 3078F DIAST BP <80 MM HG: CPT | Mod: CPTII,S$GLB,, | Performed by: STUDENT IN AN ORGANIZED HEALTH CARE EDUCATION/TRAINING PROGRAM

## 2023-05-25 PROCEDURE — 93005 RHYTHM STRIP: ICD-10-PCS | Mod: S$GLB,,, | Performed by: STUDENT IN AN ORGANIZED HEALTH CARE EDUCATION/TRAINING PROGRAM

## 2023-05-25 PROCEDURE — 93005 ELECTROCARDIOGRAM TRACING: CPT | Mod: S$GLB,,, | Performed by: STUDENT IN AN ORGANIZED HEALTH CARE EDUCATION/TRAINING PROGRAM

## 2023-05-25 PROCEDURE — 93010 ELECTROCARDIOGRAM REPORT: CPT | Mod: S$GLB,,, | Performed by: INTERNAL MEDICINE

## 2023-05-25 PROCEDURE — 99214 PR OFFICE/OUTPT VISIT, EST, LEVL IV, 30-39 MIN: ICD-10-PCS | Mod: S$GLB,,, | Performed by: STUDENT IN AN ORGANIZED HEALTH CARE EDUCATION/TRAINING PROGRAM

## 2023-05-25 NOTE — PROGRESS NOTES
Electrophysiology Clinic Note    Reason for follow-up patient visit: Ongoing evaluation and recommendations regarding paroxysmal atrial fibrillation, s/p successful radiofrequency catheter ablation with pulmonary vein isolation on 10/5/2022.     PRESENTING HISTORY:     History of Present Illness:  Ms. Maria Dolores Tamayo is a alisa 53-year-old woman who returns to clinic today for ongoing evaluation and recommendations regarding paroxysmal atrial fibrillation, s/p successful radiofrequency catheter ablation with pulmonary vein isolation on 10/5/2022. 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 She was previously admitted from 4/22-25/2022 from her rehabilitation center with several alerts from her LifeVest, consistent with multiple events of atrial fibrillation with RVR. She returns to clinic today for ongoing evaluation following her successful radiofrequency catheter ablation with pulmonary vein isolation.     In discussion with Ms. Tamayo, she tells me that she is feeling overall quite well and has continued to recover following her recent ablation. She feels that she has returned to her previous baseline level of health. She denies any recurrent symptoms of palpitations following her ablation. She was previously maintained on amiodarone 200mg po daily, but following her successful PVI, we obtained an ambulatory event monitor after the blanking period revealing no recurrent atrial fibrillation, and this antiarrhythmic agent was successfully discontinued. She has a [employer] andrea and denies any recurrent episodes of atrial fibrillation, and denies any  symptoms of racing heart rates or recurrent palpitations. While previously on amiodarone therapy, she reported symptoms of vivid dreams and fatigue on this medication. Since discontinuation these side effects have entirely resolved. She has had recovery of her systolic function, with most recent LVEF 55%. She has been vigilant to wear her Apple watch, and denies any further episodes of atrial fibrillation on her Apple watch. She is very vigilant to continue her apixaban 2.5mg po BID. Her dose was decreased from 5mg po BID following significant uterine bleeding, and she denies any subsequent events of bleeding since reducing her dose of apixaban. She has no residual deficits following her prior stroke, although she reports feeling increased word searching. She remains in sinus rhythm on all subsequent ECGs, with no evidence of prolonged QT, nonsustained or sustained VT. 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 I called her outside hospital in an effort to obtain strips of her prior episode of WCT. Unfortunately, this event occurred in the CT scanner and no strips or ECGs were recorded. Currently, she has no indication for ICD implantation. We previously discussed obtaining a cardiac MRI in order to further assess for possible VT substrate in the future; however, as she has not had any subsequent events of arrhythmia following her successful ablation, we will continue to monitor at this time.     She denies any episodes of dizziness, lightheadedness, syncope/presyncope, chest pain or chest discomfort, palpitations, nausea or vomiting, orthopnea, lower extremity edema, or PND. She reports baseline mild shortness of breath and dyspnea with exertion that she feels has remained stable for her. She reports that she has recently started Mounjaro and has been making excellent progress with weight loss. She can climb more than one flight of stairs prior to  needing to take a break and remains very physically active without reported limitation.     Review of Systems:  Review of Systems   Constitutional:  Negative for activity change.   HENT:  Negative for nasal congestion, nosebleeds, postnasal drip, rhinorrhea, sinus pressure/congestion, sneezing and sore throat.    Eyes:  Negative for visual disturbance.   Respiratory:  Positive for shortness of breath. Negative for apnea, cough, chest tightness and wheezing.    Cardiovascular:  Negative for chest pain, palpitations, leg swelling and claudication.   Gastrointestinal:  Negative for abdominal distention, abdominal pain, blood in stool, change in bowel habit, constipation, diarrhea, nausea, vomiting and change in bowel habit.   Genitourinary:  Negative for dysuria and hematuria.   Musculoskeletal:  Negative for gait problem.   Neurological:  Negative for dizziness, seizures, syncope, weakness, light-headedness, headaches, coordination difficulties and coordination difficulties.      PAST HISTORY:     Past Medical History:   Diagnosis Date    Abnormal Pap smear of cervix     Anticoagulant long-term use     CHF (congestive heart failure)     Coronary artery disease     Encounter for blood transfusion     Heart murmur     atrial fibrillation    Myocardial infarction     Stroke        Past Surgical History:   Procedure Laterality Date    ABLATION OF ARRHYTHMOGENIC FOCUS FOR ATRIAL FIBRILLATION N/A 10/05/2022    Procedure: Ablation atrial fibrillation;  Surgeon: PLACIDO Cervantes MD;  Location: [facility] EP LAB;  Service: Cardiology;  Laterality: N/A;  AF, ROWDY, PVI, RFA, Carto, Gen, EH, 3 Prep    AUGMENTATION OF BREAST      COSMETIC SURGERY      heart abasian  01/13/2023    UTERINE ARTERY EMBOLIZATION  2000       Family History:  Family History   Problem Relation Age of Onset    Progressive Supranuclear Palsy Mother 73    Cancer Maternal Aunt         ovarian    Ovarian cancer Maternal Aunt 60    Diabetes Paternal Grandmother       "   prediabetic    Breast cancer Neg Hx     Colon cancer Neg Hx     Uterine cancer Neg Hx        Social History:  She  reports that she has never smoked. She has never used smokeless tobacco. She reports that she does not currently use alcohol. She reports that she does not use drugs.      MEDICATIONS & ALLERGIES:     Review of patient's allergies indicates:  No Known Allergies    Current Outpatient Medications on File Prior to Visit   Medication Sig Dispense Refill    apixaban (ELIQUIS) 2.5 mg Tab Take 1 tablet (2.5 mg total) by mouth 2 (two) times daily. 60 tablet 4    atorvastatin (LIPITOR) 40 MG tablet Take 40 mg by mouth once daily.      furosemide (LASIX) 20 MG tablet Take 1 tablet (20 mg total) by mouth daily as needed. edema (Patient taking differently: Take 20 mg by mouth daily as needed. Taking as needed.) 30 tablet 0    losartan (COZAAR) 25 MG tablet Take 1 tablet (25 mg total) by mouth once daily. 90 tablet 0    MOUNJARO 2.5 mg/0.5 mL PnIj SMARTSI Pre-Filled Pen Syringe SUB-Q Once a Week      pantoprazole (PROTONIX) 40 MG tablet TAKE 1 TABLET BY MOUTH EVERY DAY 30 tablet 0    progesterone (PROMETRIUM) 100 MG capsule TAKE 1 CAPSULE BY MOUTH ONCE DAILY. 90 capsule 1     No current facility-administered medications on file prior to visit.        OBJECTIVE:     Vital Signs:  /62   Pulse 62   Ht 5' 4" (1.626 m)   Wt 71.6 kg (157 lb 13.6 oz)   BMI 27.09 kg/m²     Physical Exam:  Physical Exam  Constitutional:       General: She is not in acute distress.     Appearance: Normal appearance. She is not ill-appearing or diaphoretic.      Comments: Well-appearing woman in NAD.   HENT:      Head: Normocephalic and atraumatic.      Nose: Nose normal.      Mouth/Throat:      Mouth: Mucous membranes are moist.      Pharynx: Oropharynx is clear.   Eyes:      Pupils: Pupils are equal, round, and reactive to light.   Cardiovascular:      Rate and Rhythm: Normal rate and regular rhythm.      Pulses: Normal " pulses.      Heart sounds: Normal heart sounds. No murmur heard.    No friction rub. No gallop.   Pulmonary:      Effort: Pulmonary effort is normal. No respiratory distress.      Breath sounds: Normal breath sounds. No wheezing, rhonchi or rales.   Chest:      Chest wall: No tenderness.   Abdominal:      General: There is no distension.      Palpations: Abdomen is soft.      Tenderness: There is no abdominal tenderness.   Musculoskeletal:         General: No swelling or tenderness.      Cervical back: Normal range of motion.      Right lower leg: No edema.      Left lower leg: No edema.   Skin:     General: Skin is warm and dry.      Findings: No erythema, lesion or rash.   Neurological:      General: No focal deficit present.      Mental Status: She is alert and oriented to person, place, and time. Mental status is at baseline.      Motor: No weakness.      Gait: Gait normal.   Psychiatric:         Mood and Affect: Mood normal.         Behavior: Behavior normal.      Laboratory Data:  Lab Results   Component Value Date    WBC 9.22 10/20/2022    HGB 9.7 (L) 10/20/2022    HCT 33.2 (L) 10/20/2022    MCV 77 (L) 10/20/2022     10/20/2022     Lab Results   Component Value Date    GLU 99 10/20/2022     10/20/2022    K 3.8 10/20/2022     10/20/2022    CO2 23 10/20/2022    BUN 16 10/20/2022    CREATININE 0.9 10/20/2022    CALCIUM 9.8 10/20/2022    MG 2.0 07/30/2022     Lab Results   Component Value Date    INR 1.0 09/20/2022       Pertinent Cardiac Data:  ECG: Normal sinus rhythm with mild sinus arrhythmia, rate of 62 bpm,  ms, QRS 82 ms, QT/QTc 436/442 ms.     Resting 2D Transthoracic Echocardiogram - 4/23/2022:  The left ventricle is normal in size with eccentric hypertrophy and normal systolic function. The estimated ejection fraction is 55%.  Normal right ventricular size with normal right ventricular systolic function.  Mild left atrial enlargement.  Normal central venous pressure (3  mmHg).  Atrial fibrillation present throughout the examination.     EPS with RFA PVI - 10/5/2022:    Successful radiofrequency ablation with pulmonary vein isolation (PVI).    3D mapping performed with Carto 3.    Electrophysiology study with coronary sinus pacing.    His bundle recording.    Intracardiac echo.    48-Hour Holter Monitor - 2/22/2023:  Baseline rhythm was sinus bradycardia with normal intervals and an average heart rate of 58 bpm.  There were no reported symptoms.  There were very rare PVCs with an overall PVC burden of 0%. There were very rare PACs with an overall PAC burden of 0%.  There were no atrial or ventricular arrhythmias. There was no evidence of recurrent atrial fibrillation.      ASSESSMENT & PLAN:   Ms. Maria Dolores Tamayo is a alisa 53-year-old woman who returns to clinic today for ongoing evaluation and recommendations regarding paroxysmal atrial fibrillation, s/p successful radiofrequency catheter ablation with pulmonary vein isolation on 10/5/2022. 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 She was previously admitted from 4/22-25/2022 from her rehabilitation center with several alerts from her LifeVest, consistent with multiple events of atrial fibrillation with RVR. She returns to clinic today for ongoing evaluation following her successful radiofrequency catheter ablation with pulmonary vein isolation.     - We continue to discuss the pathophysiology of atrial fibrillation; specifically, we discussed paroxysmal atrial fibrillation and the concept that some patients may experience paroxysms of atrial fibrillation  "interrupting periods of sinus rhythm. We discussed that the biggest risk associated with atrial fibrillation is an increased risk of CVA.  - Following her successful pulmonary vein isolation procedure she denies any subsequent episodes of atrial fibrillation on both symptom review and on her Apple watch recordings. We reviewed the results of her recent 48-hour Holter monitor that did not reveal any events of recurrent atrial fibrillation.   - She has been successfully discontinued from amiodarone 200mg po daily with maintenance of sinus rhythm. She previously reported vivid dreams and increased fatigue on this agent, and as she is now beyond the blanking period following her successful PVI, we have discontinued this agent.  - She has been discontinued from her lisinopril therapy as her blood pressures remain at goal off of any antihypertensive agent.    - Her [id number] is 3 (prior CVA, female gender, age less than 64), portending an annual adjusted risk of CVA of 3.2%. She remains on uninterrupted apixaban therapy with no bleeding events reported.   - She has recovery of her systolic function with no recorded VT. My suspicion was that her prior episode of reported "wide complex tachycardia" was actually aberrant atrial fibrillation with RVR at OSH rather than true VT - this event occurred in a CT scanner with no tracings available to confirm. We discussed obtaining a cardiac MRI following her ablation for complete assessment of LGE for risk stratification for potential VT; however, she has not had any subsequent palpitations, and she would prefer to defer this imaging at this time.   - Possible underlying drivers of atrial fibrillation were addressed at this appointment, including recommendations for maintenance of a healthy BMI - now a class I recommendation. Review of laboratory data revealed acceptable TSH at 2.195. She denies any loud snoring or excessive daytime sleepiness.    - She will continue to follow " with her PCP and neurology for ongoing management of her comorbid conditions.     This patient will return to clinic with me in six months. She will remain on apixaban oral anticoagulation. All questions and concerns were addressed at this encounter.     Signing Physician:       DEZ Cervantes MD  Electrophysiology Attending

## 2023-05-26 ENCOUNTER — HOSPITAL ENCOUNTER (OUTPATIENT)
Dept: RADIOLOGY | Facility: OTHER | Age: 53
Discharge: HOME | End: 2023-05-26
Attending: OBSTETRICS & GYNECOLOGY
Payer: COMMERCIAL

## 2023-05-26 DIAGNOSIS — Z12.31 ENCOUNTER FOR SCREENING MAMMOGRAM FOR MALIGNANT NEOPLASM OF BREAST: ICD-10-CM

## 2023-05-26 PROCEDURE — 77067 SCR MAMMO BI INCL CAD: CPT | Mod: 26,,, | Performed by: RADIOLOGY

## 2023-05-26 PROCEDURE — 77063 MAMMO DIGITAL SCREENING BILAT WITH TOMO: ICD-10-PCS | Mod: 26,,, | Performed by: RADIOLOGY

## 2023-05-26 PROCEDURE — 77067 MAMMO DIGITAL SCREENING BILAT WITH TOMO: ICD-10-PCS | Mod: 26,,, | Performed by: RADIOLOGY

## 2023-05-26 PROCEDURE — 77063 BREAST TOMOSYNTHESIS BI: CPT | Mod: 26,,, | Performed by: RADIOLOGY

## 2023-05-26 PROCEDURE — 77067 SCR MAMMO BI INCL CAD: CPT | Mod: TC

## 2023-06-14 ENCOUNTER — OFFICE VISIT (OUTPATIENT)
Dept: OBSTETRICS AND GYNECOLOGY | Facility: CLINIC | Age: 53
End: 2023-06-14
Attending: OBSTETRICS & GYNECOLOGY
Payer: COMMERCIAL

## 2023-06-14 VITALS
BODY MASS INDEX: 26.87 KG/M2 | HEIGHT: 64 IN | WEIGHT: 157.38 LBS | DIASTOLIC BLOOD PRESSURE: 82 MMHG | SYSTOLIC BLOOD PRESSURE: 120 MMHG

## 2023-06-14 DIAGNOSIS — Z12.4 SCREENING FOR MALIGNANT NEOPLASM OF THE CERVIX: ICD-10-CM

## 2023-06-14 DIAGNOSIS — Z01.419 ENCOUNTER FOR GYNECOLOGICAL EXAMINATION WITHOUT ABNORMAL FINDING: ICD-10-CM

## 2023-06-14 DIAGNOSIS — Z12.31 ENCOUNTER FOR SCREENING MAMMOGRAM FOR MALIGNANT NEOPLASM OF BREAST: Primary | ICD-10-CM

## 2023-06-14 DIAGNOSIS — Z86.73 HISTORY OF CEREBROVASCULAR ACCIDENT (CVA) FROM LEFT CAROTID ARTERY OCCLUSION INVOLVING LEFT MIDDLE CEREBRAL ARTERY TERRITORY: ICD-10-CM

## 2023-06-14 PROCEDURE — 3008F PR BODY MASS INDEX (BMI) DOCUMENTED: ICD-10-PCS | Mod: CPTII,S$GLB,, | Performed by: OBSTETRICS & GYNECOLOGY

## 2023-06-14 PROCEDURE — 99396 PR PREVENTIVE VISIT,EST,40-64: ICD-10-PCS | Mod: S$GLB,,, | Performed by: OBSTETRICS & GYNECOLOGY

## 2023-06-14 PROCEDURE — 3079F PR MOST RECENT DIASTOLIC BLOOD PRESSURE 80-89 MM HG: ICD-10-PCS | Mod: CPTII,S$GLB,, | Performed by: OBSTETRICS & GYNECOLOGY

## 2023-06-14 PROCEDURE — 99999 PR PBB SHADOW E&M-EST. PATIENT-LVL III: CPT | Mod: PBBFAC,,, | Performed by: OBSTETRICS & GYNECOLOGY

## 2023-06-14 PROCEDURE — 3008F BODY MASS INDEX DOCD: CPT | Mod: CPTII,S$GLB,, | Performed by: OBSTETRICS & GYNECOLOGY

## 2023-06-14 PROCEDURE — 3079F DIAST BP 80-89 MM HG: CPT | Mod: CPTII,S$GLB,, | Performed by: OBSTETRICS & GYNECOLOGY

## 2023-06-14 PROCEDURE — 3074F SYST BP LT 130 MM HG: CPT | Mod: CPTII,S$GLB,, | Performed by: OBSTETRICS & GYNECOLOGY

## 2023-06-14 PROCEDURE — 87625 HPV TYPES 16 & 18 ONLY: CPT | Mod: 59 | Performed by: OBSTETRICS & GYNECOLOGY

## 2023-06-14 PROCEDURE — 4010F PR ACE/ARB THEARPY RXD/TAKEN: ICD-10-PCS | Mod: CPTII,S$GLB,, | Performed by: OBSTETRICS & GYNECOLOGY

## 2023-06-14 PROCEDURE — 87624 HPV HI-RISK TYP POOLED RSLT: CPT | Performed by: OBSTETRICS & GYNECOLOGY

## 2023-06-14 PROCEDURE — 3074F PR MOST RECENT SYSTOLIC BLOOD PRESSURE < 130 MM HG: ICD-10-PCS | Mod: CPTII,S$GLB,, | Performed by: OBSTETRICS & GYNECOLOGY

## 2023-06-14 PROCEDURE — 99396 PREV VISIT EST AGE 40-64: CPT | Mod: S$GLB,,, | Performed by: OBSTETRICS & GYNECOLOGY

## 2023-06-14 PROCEDURE — 99999 PR PBB SHADOW E&M-EST. PATIENT-LVL III: ICD-10-PCS | Mod: PBBFAC,,, | Performed by: OBSTETRICS & GYNECOLOGY

## 2023-06-14 PROCEDURE — 88175 CYTOPATH C/V AUTO FLUID REDO: CPT | Performed by: OBSTETRICS & GYNECOLOGY

## 2023-06-14 PROCEDURE — 4010F ACE/ARB THERAPY RXD/TAKEN: CPT | Mod: CPTII,S$GLB,, | Performed by: OBSTETRICS & GYNECOLOGY

## 2023-06-14 RX ORDER — TIRZEPATIDE 7.5 MG/.5ML
INJECTION, SOLUTION SUBCUTANEOUS
COMMUNITY
Start: 2023-06-02 | End: 2023-08-22

## 2023-06-14 NOTE — PROGRESS NOTES
SUBJECTIVE:   53 y.o. female   for annual routine Pap and checkup. Patient's last menstrual period was 2023..  She has no unusual complaints and remains on Eliquis for her history of stroke. She has lsot 26 pounds on Mounjaro. .    No bleeding since February    Past Medical History:   Diagnosis Date    Abnormal Pap smear of cervix     Anticoagulant long-term use     CHF (congestive heart failure)     Coronary artery disease     Encounter for blood transfusion     Heart murmur     atrial fibrillation    Myocardial infarction     Stroke      Past Surgical History:   Procedure Laterality Date    ABLATION OF ARRHYTHMOGENIC FOCUS FOR ATRIAL FIBRILLATION N/A 10/05/2022    Procedure: Ablation atrial fibrillation;  Surgeon: PLACIDO Cervantes MD;  Location: [facility] EP LAB;  Service: Cardiology;  Laterality: N/A;  AF, ROWDY, PVI, RFA, Carto, Gen, EH, 3 Prep    AUGMENTATION OF BREAST      COSMETIC SURGERY      heart abasian  2023    UTERINE ARTERY EMBOLIZATION       Social History     Socioeconomic History    Marital status:    Tobacco Use    Smoking status: Never    Smokeless tobacco: Never   Substance and Sexual Activity    Alcohol use: Not Currently     Comment: rare    Drug use: No    Sexual activity: Yes     Partners: Male     Birth control/protection: None     Family History   Problem Relation Age of Onset    Progressive Supranuclear Palsy Mother 73    Cancer Maternal Aunt         ovarian    Ovarian cancer Maternal Aunt 60    Diabetes Paternal Grandmother         prediabetic    Breast cancer Neg Hx     Colon cancer Neg Hx     Uterine cancer Neg Hx      OB History    Para Term  AB Living   2 2 1         SAB IAB Ectopic Multiple Live Births                  # Outcome Date GA Lbr Nima/2nd Weight Sex Delivery Anes PTL Lv   2 Term            1 Para      Vag-Spont              Current Outpatient Medications   Medication Sig Dispense Refill    apixaban (ELIQUIS) 2.5 mg Tab Take 1 tablet  (2.5 mg total) by mouth 2 (two) times daily. 60 tablet 4    furosemide (LASIX) 20 MG tablet Take 1 tablet (20 mg total) by mouth daily as needed. edema (Patient taking differently: Take 20 mg by mouth daily as needed. Taking as needed.) 30 tablet 0    MOUNJARO 7.5 mg/0.5 mL PnIj SMARTSI.5 Milligram(s) SUB-Q Once a Week       No current facility-administered medications for this visit.     Allergies: Patient has no known allergies.     The ASCVD Risk score (Alamo DK, et al., 2019) failed to calculate for the following reasons:    The patient has a prior MI or stroke diagnosis      ROS:  Constitutional: no weight loss, weight gain, fever, fatigue  Eyes:  No vision changes, glasses/contacts  ENT/Mouth: No ulcers, sinus problems, ears ringing, headache  Cardiovascular: No inability to lie flat, chest pain, exercise intolerance, swelling, heart palpitations  Respiratory: No wheezing, coughing blood, shortness of breath, or cough  Gastrointestinal: No diarrhea, bloody stool, nausea/vomiting, constipation, gas, hemorrhoids  Genitourinary: No blood in urine, painful urination, urgency of urination, frequency of urination, incomplete emptying, incontinence, abnormal bleeding, painful periods, heavy periods, vaginal discharge, vaginal odor, painful intercourse, sexual problems, bleeding after intercourse.  Musculoskeletal: No muscle weakness  Skin/Breast: No painful breasts, nipple discharge, masses, rash, ulcers  Neurological: No passing out, seizures, numbness, headache  Endocrine: No diabetes, hypothyroid, hyperthyroid, hot flashes, hair loss, abnormal hair growth, acne  Psychiatric: No depression, crying  Hematologic: No bruises, bleeding, swollen lymph nodes, anemia.      Physical Exam:   Constitutional: She is oriented to person, place, and time. She appears well-developed and well-nourished.      Neck: No tracheal deviation present. No thyromegaly present.    Cardiovascular:       Exam reveals no edema.         Pulmonary/Chest: Effort normal. She exhibits no mass, no tenderness, no deformity and no retraction. Right breast exhibits no inverted nipple, no mass, no nipple discharge, no skin change, no tenderness, presence, no bleeding and no swelling. Left breast exhibits no inverted nipple, no mass, no nipple discharge, no skin change, no tenderness, presence, no bleeding and no swelling. Breasts are symmetrical.        Abdominal: Soft. She exhibits no distension and no mass. There is no abdominal tenderness. There is no rebound and no guarding. No hernia. Hernia confirmed negative in the left inguinal area.     Genitourinary:    Vagina and uterus normal.   Rectum:      No external hemorrhoid.   There is no rash, tenderness or lesion on the right labia. There is no rash, tenderness or lesion on the left labia. Cervix is normal. No no adexnal prolapse. Right adnexum displays no mass, no tenderness and no fullness. Left adnexum displays no mass, no tenderness and no fullness. No  no vaginal discharge, tenderness, bleeding, rectocele, cystocele or unspecified prolapse of vaginal walls in the vagina. Cervix exhibits no motion tenderness, no discharge and no friability. Uterus is not deviated.           Musculoskeletal: Normal range of motion and moves all extremeties. No edema.       Neurological: She is alert and oriented to person, place, and time.    Skin: No rash noted. No erythema. No pallor.    Psychiatric: She has a normal mood and affect. Her behavior is normal. Judgment and thought content normal.       ASSESSMENT:   well woman    PLAN:   mammogram  pap smear- will let Dr. Bermudez know about her results.   return annually or prn

## 2023-06-23 LAB
CLINICAL INFO: NORMAL
CYTO CVX: NORMAL
CYTOLOGIST CVX/VAG CYTO: NORMAL
CYTOLOGIST CVX/VAG CYTO: NORMAL
CYTOLOGY CMNT CVX/VAG CYTO-IMP: NORMAL
CYTOLOGY PAP THIN PREP EXPLANATION: NORMAL
DATE OF PREVIOUS PAP: NORMAL
DATE PREVIOUS BX: NO
GEN CATEG CVX/VAG CYTO-IMP: NORMAL
HPV I/H RISK 4 DNA CVX QL NAA+PROBE: DETECTED
HPV16 DNA CVX QL PROBE+SIG AMP: DETECTED
HPV18 DNA CVX QL PROBE+SIG AMP: NOT DETECTED
LMP START DATE: NORMAL
MICROORGANISM CVX/VAG CYTO: NORMAL
PATHOLOGIST CVX/VAG CYTO: NORMAL
SERVICE CMNT-IMP: NORMAL
SPECIMEN SOURCE CVX/VAG CYTO: NORMAL
STAT OF ADQ CVX/VAG CYTO-IMP: NORMAL

## 2023-07-19 ENCOUNTER — TELEPHONE (OUTPATIENT)
Dept: OBSTETRICS AND GYNECOLOGY | Facility: CLINIC | Age: 53
End: 2023-07-19
Payer: COMMERCIAL

## 2023-08-09 ENCOUNTER — PATIENT MESSAGE (OUTPATIENT)
Dept: OBSTETRICS AND GYNECOLOGY | Facility: CLINIC | Age: 53
End: 2023-08-09
Payer: COMMERCIAL

## 2023-08-22 ENCOUNTER — PROCEDURE VISIT (OUTPATIENT)
Dept: OBSTETRICS AND GYNECOLOGY | Facility: CLINIC | Age: 53
End: 2023-08-22
Attending: OBSTETRICS & GYNECOLOGY
Payer: COMMERCIAL

## 2023-08-22 VITALS
SYSTOLIC BLOOD PRESSURE: 103 MMHG | HEART RATE: 67 BPM | HEIGHT: 64 IN | BODY MASS INDEX: 26.29 KG/M2 | DIASTOLIC BLOOD PRESSURE: 51 MMHG | WEIGHT: 154 LBS

## 2023-08-22 DIAGNOSIS — R87.810 CERVICAL HIGH RISK HPV (HUMAN PAPILLOMAVIRUS) TEST POSITIVE: Primary | ICD-10-CM

## 2023-08-22 LAB
B-HCG UR QL: NEGATIVE
CTP QC/QA: YES

## 2023-08-22 PROCEDURE — 88305 TISSUE EXAM BY PATHOLOGIST: CPT | Mod: 26,,, | Performed by: PATHOLOGY

## 2023-08-22 PROCEDURE — 81025 POCT URINE PREGNANCY: ICD-10-PCS | Mod: S$GLB,,, | Performed by: OBSTETRICS & GYNECOLOGY

## 2023-08-22 PROCEDURE — 88305 TISSUE EXAM BY PATHOLOGIST: CPT | Performed by: PATHOLOGY

## 2023-08-22 PROCEDURE — 88305 TISSUE EXAM BY PATHOLOGIST: ICD-10-PCS | Mod: 26,,, | Performed by: PATHOLOGY

## 2023-08-22 PROCEDURE — 57454 PR COLPOSC,CERVIX W/ADJ VAG,W/BX & CURRETAG: ICD-10-PCS | Mod: S$GLB,,, | Performed by: OBSTETRICS & GYNECOLOGY

## 2023-08-22 PROCEDURE — 81025 URINE PREGNANCY TEST: CPT | Mod: S$GLB,,, | Performed by: OBSTETRICS & GYNECOLOGY

## 2023-08-22 PROCEDURE — 57454 BX/CURETT OF CERVIX W/SCOPE: CPT | Mod: S$GLB,,, | Performed by: OBSTETRICS & GYNECOLOGY

## 2023-08-22 RX ORDER — TIRZEPATIDE 10 MG/.5ML
INJECTION, SOLUTION SUBCUTANEOUS
COMMUNITY
Start: 2023-07-05

## 2023-08-22 NOTE — PROCEDURES
Colposcopy W/BIOPSY AND ECC- Today    Date/Time: 8/22/2023 9:00 AM    Performed by: Rachel Abad MD  Authorized by: Rachel Abad MD    Consent Done?:  Yes (Written)  Timeout:Immediately prior to procedure a time out was called to verify the correct patient, procedure, equipment, support staff and site/side marked as required  Prep:Patient was prepped and draped in the usual sterile fashion  Assistants?: No      Colposcopy Site:  Cervix  Position:  Supine  Acrowhite Lesion? Yes    Atypical Vessels: No    Transformation Zone Adequate?: Yes    Biopsy?: Yes         Location:  Cervix ((11 00))  ECC Performed?: Yes    LEEP Performed?: No     Patient tolerated the procedure well with no immediate complications.

## 2023-08-25 LAB
FINAL PATHOLOGIC DIAGNOSIS: NORMAL
GROSS: NORMAL
Lab: NORMAL

## 2023-08-28 ENCOUNTER — OFFICE VISIT (OUTPATIENT)
Dept: GYNECOLOGIC ONCOLOGY | Facility: CLINIC | Age: 53
End: 2023-08-28
Payer: COMMERCIAL

## 2023-08-28 VITALS
HEART RATE: 66 BPM | WEIGHT: 153 LBS | SYSTOLIC BLOOD PRESSURE: 115 MMHG | DIASTOLIC BLOOD PRESSURE: 55 MMHG | BODY MASS INDEX: 26.26 KG/M2

## 2023-08-28 DIAGNOSIS — N93.8 DYSFUNCTIONAL UTERINE BLEEDING: Primary | ICD-10-CM

## 2023-08-28 DIAGNOSIS — Z79.01 CURRENT USE OF LONG TERM ANTICOAGULATION: ICD-10-CM

## 2023-08-28 PROCEDURE — 99214 PR OFFICE/OUTPT VISIT, EST, LEVL IV, 30-39 MIN: ICD-10-PCS | Mod: S$GLB,,, | Performed by: OBSTETRICS & GYNECOLOGY

## 2023-08-28 PROCEDURE — 1159F PR MEDICATION LIST DOCUMENTED IN MEDICAL RECORD: ICD-10-PCS | Mod: CPTII,S$GLB,, | Performed by: OBSTETRICS & GYNECOLOGY

## 2023-08-28 PROCEDURE — 1160F PR REVIEW ALL MEDS BY PRESCRIBER/CLIN PHARMACIST DOCUMENTED: ICD-10-PCS | Mod: CPTII,S$GLB,, | Performed by: OBSTETRICS & GYNECOLOGY

## 2023-08-28 PROCEDURE — 1159F MED LIST DOCD IN RCRD: CPT | Mod: CPTII,S$GLB,, | Performed by: OBSTETRICS & GYNECOLOGY

## 2023-08-28 PROCEDURE — 3074F PR MOST RECENT SYSTOLIC BLOOD PRESSURE < 130 MM HG: ICD-10-PCS | Mod: CPTII,S$GLB,, | Performed by: OBSTETRICS & GYNECOLOGY

## 2023-08-28 PROCEDURE — 99999 PR PBB SHADOW E&M-EST. PATIENT-LVL III: ICD-10-PCS | Mod: PBBFAC,,, | Performed by: OBSTETRICS & GYNECOLOGY

## 2023-08-28 PROCEDURE — 3074F SYST BP LT 130 MM HG: CPT | Mod: CPTII,S$GLB,, | Performed by: OBSTETRICS & GYNECOLOGY

## 2023-08-28 PROCEDURE — 3008F PR BODY MASS INDEX (BMI) DOCUMENTED: ICD-10-PCS | Mod: CPTII,S$GLB,, | Performed by: OBSTETRICS & GYNECOLOGY

## 2023-08-28 PROCEDURE — 3078F DIAST BP <80 MM HG: CPT | Mod: CPTII,S$GLB,, | Performed by: OBSTETRICS & GYNECOLOGY

## 2023-08-28 PROCEDURE — 3078F PR MOST RECENT DIASTOLIC BLOOD PRESSURE < 80 MM HG: ICD-10-PCS | Mod: CPTII,S$GLB,, | Performed by: OBSTETRICS & GYNECOLOGY

## 2023-08-28 PROCEDURE — 99214 OFFICE O/P EST MOD 30 MIN: CPT | Mod: S$GLB,,, | Performed by: OBSTETRICS & GYNECOLOGY

## 2023-08-28 PROCEDURE — 4010F PR ACE/ARB THEARPY RXD/TAKEN: ICD-10-PCS | Mod: CPTII,S$GLB,, | Performed by: OBSTETRICS & GYNECOLOGY

## 2023-08-28 PROCEDURE — 1160F RVW MEDS BY RX/DR IN RCRD: CPT | Mod: CPTII,S$GLB,, | Performed by: OBSTETRICS & GYNECOLOGY

## 2023-08-28 PROCEDURE — 3008F BODY MASS INDEX DOCD: CPT | Mod: CPTII,S$GLB,, | Performed by: OBSTETRICS & GYNECOLOGY

## 2023-08-28 PROCEDURE — 99999 PR PBB SHADOW E&M-EST. PATIENT-LVL III: CPT | Mod: PBBFAC,,, | Performed by: OBSTETRICS & GYNECOLOGY

## 2023-08-28 PROCEDURE — 4010F ACE/ARB THERAPY RXD/TAKEN: CPT | Mod: CPTII,S$GLB,, | Performed by: OBSTETRICS & GYNECOLOGY

## 2023-08-28 NOTE — PROGRESS NOTES
Subjective:      Patient ID: Maria Dolores Tamayo is a 53 y.o. female.    Chief Complaint: Follow-up (6 mth fu)        HPI  Here today for f/u of DUB and fibroids.  Was recently seen by Dr. Abad for colpo and biopsies.  No dysplasia detected.  Currently asymptomatic  Review of Systems   Constitutional:  Negative for activity change, appetite change, chills, fatigue and fever.   HENT:  Negative for hearing loss, mouth sores, nosebleeds, sore throat and tinnitus.    Eyes:  Negative for visual disturbance.   Respiratory:  Negative for cough, chest tightness, shortness of breath and wheezing.    Cardiovascular:  Negative for chest pain and leg swelling.   Gastrointestinal:  Negative for abdominal distention, abdominal pain, blood in stool, constipation, diarrhea, nausea and vomiting.   Genitourinary:  Negative for dysuria, flank pain, frequency, hematuria, pelvic pain, vaginal bleeding, vaginal discharge and vaginal pain.   Musculoskeletal:  Negative for arthralgias and back pain.   Skin:  Negative for rash.   Neurological:  Negative for dizziness, seizures, syncope, weakness and numbness.   Hematological:  Does not bruise/bleed easily.   Psychiatric/Behavioral:  Negative for confusion and sleep disturbance. The patient is not nervous/anxious.        Objective:   Physical Exam:   Constitutional: She is oriented to person, place, and time. She appears well-developed and well-nourished. No distress.    HENT:   Head: Normocephalic and atraumatic.    Eyes: No scleral icterus.     Cardiovascular:       Exam reveals no cyanosis and no edema.        Pulmonary/Chest: Effort normal. No respiratory distress.        Abdominal: Soft. She exhibits no distension and no fluid wave. There is no abdominal tenderness. There is no rigidity.     Genitourinary:    Uterus normal.      Pelvic exam was performed with patient supine.   There is no rash, tenderness or lesion on the right labia. There is no rash, tenderness or lesion on the  left labia. Cervix is normal. Right adnexum displays no mass, no tenderness and no fullness. Left adnexum displays no mass, no tenderness and no fullness. There is bleeding (scant w discharge) in the vagina. No  no vaginal discharge in the vagina. Uterus is not enlarged, not fixed, not tender and not hosting fibroids.           Musculoskeletal: Normal range of motion and moves all extremeties. No edema.       Neurological: She is alert and oriented to person, place, and time.    Skin: Skin is warm. No rash noted. No cyanosis or erythema.    Psychiatric: She has a normal mood and affect. Thought content normal.       Assessment:     1. Dysfunctional uterine bleeding    2. Current use of long term anticoagulation        Plan:       Doing well overall.  Given lack of symptoms and no bleeding, would prefer to delay surgery as long as possible.  RTC 6 months or sooner with symptoms.

## 2023-10-13 ENCOUNTER — TELEPHONE (OUTPATIENT)
Dept: ENDOSCOPY | Facility: HOSPITAL | Age: 53
End: 2023-10-13
Payer: COMMERCIAL

## 2023-10-13 ENCOUNTER — CLINICAL SUPPORT (OUTPATIENT)
Dept: INTERNAL MEDICINE | Facility: CLINIC | Age: 53
End: 2023-10-13
Payer: COMMERCIAL

## 2023-10-13 ENCOUNTER — OFFICE VISIT (OUTPATIENT)
Dept: INTERNAL MEDICINE | Facility: CLINIC | Age: 53
End: 2023-10-13
Payer: COMMERCIAL

## 2023-10-13 VITALS
BODY MASS INDEX: 25.31 KG/M2 | OXYGEN SATURATION: 98 % | HEIGHT: 64 IN | SYSTOLIC BLOOD PRESSURE: 109 MMHG | WEIGHT: 148.25 LBS | HEART RATE: 88 BPM | DIASTOLIC BLOOD PRESSURE: 75 MMHG

## 2023-10-13 DIAGNOSIS — Z78.0 POSTMENOPAUSAL: ICD-10-CM

## 2023-10-13 DIAGNOSIS — Z00.00 ROUTINE GENERAL MEDICAL EXAMINATION AT A HEALTH CARE FACILITY: Primary | ICD-10-CM

## 2023-10-13 DIAGNOSIS — D25.9 UTERINE LEIOMYOMA, UNSPECIFIED LOCATION: ICD-10-CM

## 2023-10-13 DIAGNOSIS — Z86.79 HISTORY OF ATRIAL FIBRILLATION: ICD-10-CM

## 2023-10-13 DIAGNOSIS — Z12.11 COLON CANCER SCREENING: ICD-10-CM

## 2023-10-13 LAB
ALBUMIN SERPL BCP-MCNC: 4.4 G/DL (ref 3.5–5.2)
ALP SERPL-CCNC: 115 U/L (ref 55–135)
ALT SERPL W/O P-5'-P-CCNC: 34 U/L (ref 10–44)
ANION GAP SERPL CALC-SCNC: 11 MMOL/L (ref 8–16)
AST SERPL-CCNC: 21 U/L (ref 10–40)
BILIRUB SERPL-MCNC: 0.6 MG/DL (ref 0.1–1)
BUN SERPL-MCNC: 16 MG/DL (ref 6–20)
CALCIUM SERPL-MCNC: 10.3 MG/DL (ref 8.7–10.5)
CHLORIDE SERPL-SCNC: 106 MMOL/L (ref 95–110)
CHOLEST SERPL-MCNC: 201 MG/DL (ref 120–199)
CHOLEST/HDLC SERPL: 3.5 {RATIO} (ref 2–5)
CO2 SERPL-SCNC: 26 MMOL/L (ref 23–29)
CREAT SERPL-MCNC: 1 MG/DL (ref 0.5–1.4)
ERYTHROCYTE [DISTWIDTH] IN BLOOD BY AUTOMATED COUNT: 12.1 % (ref 11.5–14.5)
EST. GFR  (NO RACE VARIABLE): >60 ML/MIN/1.73 M^2
ESTIMATED AVG GLUCOSE: 97 MG/DL (ref 68–131)
FERRITIN SERPL-MCNC: 154 NG/ML (ref 20–300)
FSH SERPL-ACNC: 140.7 MIU/ML
GLUCOSE SERPL-MCNC: 95 MG/DL (ref 70–110)
HBA1C MFR BLD: 5 % (ref 4–5.6)
HCT VFR BLD AUTO: 44.1 % (ref 37–48.5)
HDLC SERPL-MCNC: 58 MG/DL (ref 40–75)
HDLC SERPL: 28.9 % (ref 20–50)
HGB BLD-MCNC: 14.9 G/DL (ref 12–16)
IRON SERPL-MCNC: 125 UG/DL (ref 30–160)
LDLC SERPL CALC-MCNC: 125.6 MG/DL (ref 63–159)
MCH RBC QN AUTO: 30 PG (ref 27–31)
MCHC RBC AUTO-ENTMCNC: 33.8 G/DL (ref 32–36)
MCV RBC AUTO: 89 FL (ref 82–98)
NONHDLC SERPL-MCNC: 143 MG/DL
PLATELET # BLD AUTO: 252 K/UL (ref 150–450)
PMV BLD AUTO: 11 FL (ref 9.2–12.9)
POTASSIUM SERPL-SCNC: 4.2 MMOL/L (ref 3.5–5.1)
PROT SERPL-MCNC: 7.8 G/DL (ref 6–8.4)
RBC # BLD AUTO: 4.96 M/UL (ref 4–5.4)
SATURATED IRON: 27 % (ref 20–50)
SODIUM SERPL-SCNC: 143 MMOL/L (ref 136–145)
TOTAL IRON BINDING CAPACITY: 462 UG/DL (ref 250–450)
TRANSFERRIN SERPL-MCNC: 312 MG/DL (ref 200–375)
TRIGL SERPL-MCNC: 87 MG/DL (ref 30–150)
TSH SERPL DL<=0.005 MIU/L-ACNC: 1.86 UIU/ML (ref 0.4–4)
WBC # BLD AUTO: 6.46 K/UL (ref 3.9–12.7)

## 2023-10-13 PROCEDURE — 80061 LIPID PANEL: CPT | Performed by: INTERNAL MEDICINE

## 2023-10-13 PROCEDURE — 99999 PR PBB SHADOW E&M-EST. PATIENT-LVL III: ICD-10-PCS | Mod: PBBFAC,,, | Performed by: INTERNAL MEDICINE

## 2023-10-13 PROCEDURE — 99396 PREV VISIT EST AGE 40-64: CPT | Mod: S$GLB,,, | Performed by: INTERNAL MEDICINE

## 2023-10-13 PROCEDURE — 80053 COMPREHEN METABOLIC PANEL: CPT | Performed by: INTERNAL MEDICINE

## 2023-10-13 PROCEDURE — 3008F BODY MASS INDEX DOCD: CPT | Mod: CPTII,S$GLB,, | Performed by: INTERNAL MEDICINE

## 2023-10-13 PROCEDURE — 3074F PR MOST RECENT SYSTOLIC BLOOD PRESSURE < 130 MM HG: ICD-10-PCS | Mod: CPTII,S$GLB,, | Performed by: INTERNAL MEDICINE

## 2023-10-13 PROCEDURE — 84466 ASSAY OF TRANSFERRIN: CPT | Performed by: INTERNAL MEDICINE

## 2023-10-13 PROCEDURE — 99999 PR PBB SHADOW E&M-EST. PATIENT-LVL I: ICD-10-PCS | Mod: PBBFAC,,,

## 2023-10-13 PROCEDURE — 1159F MED LIST DOCD IN RCRD: CPT | Mod: CPTII,S$GLB,, | Performed by: INTERNAL MEDICINE

## 2023-10-13 PROCEDURE — 1159F PR MEDICATION LIST DOCUMENTED IN MEDICAL RECORD: ICD-10-PCS | Mod: CPTII,S$GLB,, | Performed by: INTERNAL MEDICINE

## 2023-10-13 PROCEDURE — 1160F PR REVIEW ALL MEDS BY PRESCRIBER/CLIN PHARMACIST DOCUMENTED: ICD-10-PCS | Mod: CPTII,S$GLB,, | Performed by: INTERNAL MEDICINE

## 2023-10-13 PROCEDURE — 4010F ACE/ARB THERAPY RXD/TAKEN: CPT | Mod: CPTII,S$GLB,, | Performed by: INTERNAL MEDICINE

## 2023-10-13 PROCEDURE — 3044F PR MOST RECENT HEMOGLOBIN A1C LEVEL <7.0%: ICD-10-PCS | Mod: CPTII,S$GLB,, | Performed by: INTERNAL MEDICINE

## 2023-10-13 PROCEDURE — 83001 ASSAY OF GONADOTROPIN (FSH): CPT | Performed by: INTERNAL MEDICINE

## 2023-10-13 PROCEDURE — 99999 PR PBB SHADOW E&M-EST. PATIENT-LVL I: CPT | Mod: PBBFAC,,,

## 2023-10-13 PROCEDURE — 36415 COLL VENOUS BLD VENIPUNCTURE: CPT | Performed by: INTERNAL MEDICINE

## 2023-10-13 PROCEDURE — 83036 HEMOGLOBIN GLYCOSYLATED A1C: CPT | Performed by: INTERNAL MEDICINE

## 2023-10-13 PROCEDURE — 4010F PR ACE/ARB THEARPY RXD/TAKEN: ICD-10-PCS | Mod: CPTII,S$GLB,, | Performed by: INTERNAL MEDICINE

## 2023-10-13 PROCEDURE — 84443 ASSAY THYROID STIM HORMONE: CPT | Performed by: INTERNAL MEDICINE

## 2023-10-13 PROCEDURE — 3074F SYST BP LT 130 MM HG: CPT | Mod: CPTII,S$GLB,, | Performed by: INTERNAL MEDICINE

## 2023-10-13 PROCEDURE — 1160F RVW MEDS BY RX/DR IN RCRD: CPT | Mod: CPTII,S$GLB,, | Performed by: INTERNAL MEDICINE

## 2023-10-13 PROCEDURE — 3008F PR BODY MASS INDEX (BMI) DOCUMENTED: ICD-10-PCS | Mod: CPTII,S$GLB,, | Performed by: INTERNAL MEDICINE

## 2023-10-13 PROCEDURE — 83540 ASSAY OF IRON: CPT | Performed by: INTERNAL MEDICINE

## 2023-10-13 PROCEDURE — 3078F PR MOST RECENT DIASTOLIC BLOOD PRESSURE < 80 MM HG: ICD-10-PCS | Mod: CPTII,S$GLB,, | Performed by: INTERNAL MEDICINE

## 2023-10-13 PROCEDURE — 99396 PR PREVENTIVE VISIT,EST,40-64: ICD-10-PCS | Mod: S$GLB,,, | Performed by: INTERNAL MEDICINE

## 2023-10-13 PROCEDURE — 3044F HG A1C LEVEL LT 7.0%: CPT | Mod: CPTII,S$GLB,, | Performed by: INTERNAL MEDICINE

## 2023-10-13 PROCEDURE — 3078F DIAST BP <80 MM HG: CPT | Mod: CPTII,S$GLB,, | Performed by: INTERNAL MEDICINE

## 2023-10-13 PROCEDURE — 99999 PR PBB SHADOW E&M-EST. PATIENT-LVL III: CPT | Mod: PBBFAC,,, | Performed by: INTERNAL MEDICINE

## 2023-10-13 PROCEDURE — 85027 COMPLETE CBC AUTOMATED: CPT | Performed by: INTERNAL MEDICINE

## 2023-10-13 PROCEDURE — 82728 ASSAY OF FERRITIN: CPT | Performed by: INTERNAL MEDICINE

## 2023-10-13 NOTE — TELEPHONE ENCOUNTER
Called patient to schedule a Pre Admit call with nurse patient was called 3 times patient did not answer left voicemail asking patient to give us call back.

## 2023-10-15 NOTE — PROGRESS NOTES
Subjective:       Patient ID: Maria Dolores Taamyo is a 53 y.o. female who presents today for:    Chief Complaint:   Chief Complaint   Patient presents with    Annual Exam       HPI:  Very pleasant 53-year-old female who comes for annual physical exam with  [employer].  She has a past medical history of paroxysmal atrial fibrillation with RVR in the spring of 2022.  This cause a cerebrovascular event without residual.  She had an ablation in October of 2022 and remains on Eliquis.  In August of last year she had very significant vaginal bleeding with anemia secondary to fibroid tumors of the uterus.  Since then she has had 1 menstrual cycle in February of 2023.  And some spotting last month.  She is following with Dr. hayward and brings today some lab work showing the postmenopausal state and an FSH of approximally 85.  She did have some hot flashes but they are brief.  She is interested in hormone replacement therapy for secondary benefits.  She really has no new complaints today and is very excited about her weight loss on Mounjaro.  She has lost over 20 lb.    Review of Systems   Constitutional:  Negative for chills, fever and weight loss.   HENT:  Negative for sore throat.    Eyes:  Negative for blurred vision and double vision.   Respiratory:  Negative for cough and shortness of breath.    Cardiovascular:  Negative for chest pain and palpitations.   Gastrointestinal:  Negative for constipation, diarrhea, nausea and vomiting.   Genitourinary:  Negative for dysuria and hematuria.   Musculoskeletal:  Negative for joint pain and myalgias.   Skin:  Negative for itching and rash.   Neurological:  Negative for sensory change, focal weakness and headaches.   Endo/Heme/Allergies:  Does not bruise/bleed easily.   Psychiatric/Behavioral:  Negative for depression and suicidal ideas.         Medications:  Outpatient Encounter Medications as of 10/13/2023   Medication Sig Dispense Refill    ASHWAGANDHA ROOT EXTRACT ORAL  "Take by mouth.      ergocalciferol, vitamin D2, (VITAMIN D ORAL) Take by mouth.      magnesium chloride (MAG 64 ORAL) Take by mouth.      MOUNJARO 10 mg/0.5 mL PnIj SMARTSIG:10 Milligram(s) SUB-Q Once a Week      UNABLE TO FIND Nutrascripts pregenolone sr 100mg      UNABLE TO FIND Nutrascripts DHEA Sr 5 mg      [DISCONTINUED] apixaban (ELIQUIS) 2.5 mg Tab Take 1 tablet (2.5 mg total) by mouth 2 (two) times daily. 60 tablet 4    apixaban (ELIQUIS) 2.5 mg Tab Take 1 tablet (2.5 mg total) by mouth 2 (two) times daily. 180 tablet 1    furosemide (LASIX) 20 MG tablet Take 1 tablet (20 mg total) by mouth daily as needed. edema (Patient not taking: Reported on 10/13/2023) 30 tablet 0     No facility-administered encounter medications on file as of 10/13/2023.       Allergies:  Review of patient's allergies indicates:  No Known Allergies    Health Maintenance:  Immunization History   Administered Date(s) Administered    COVID-19, MRNA, LN-S, PF (MODERNA FULL 0.5 ML DOSE) 01/07/2021, 02/04/2021, 12/27/2021, 01/27/2022      Health Maintenance   Topic Date Due    TETANUS VACCINE  Never done    Colorectal Cancer Screening  Never done    Shingles Vaccine (1 of 2) Never done    High Dose Statin  10/27/2023 (Originally 2/4/1990)    Mammogram  05/26/2024    Lipid Panel  10/13/2028    Hepatitis C Screening  Completed          Objective:      Vital Signs  Pulse: 88  SpO2: 98 %  BP: 109/75  Pain Score: 0-No pain  Height and Weight  Height: 5' 4" (162.6 cm)  Weight: 67.3 kg (148 lb 4.2 oz)  BSA (Calculated - sq m): 1.74 sq meters  BMI (Calculated): 25.4  Weight in (lb) to have BMI = 25: 145.3]    Physical Exam  HENT:      Head: Normocephalic and atraumatic.   Eyes:      General: No scleral icterus.  Cardiovascular:      Rate and Rhythm: Normal rate and regular rhythm.      Heart sounds: No murmur heard.  Pulmonary:      Effort: Pulmonary effort is normal.      Breath sounds: Normal breath sounds. No rales.   Abdominal:      General: " Bowel sounds are normal.      Palpations: Abdomen is soft.      Tenderness: There is no abdominal tenderness.   Musculoskeletal:         General: No tenderness.      Cervical back: Neck supple.   Neurological:      Mental Status: She is alert and oriented to person, place, and time.   Psychiatric:         Mood and Affect: Mood normal.         Behavior: Behavior normal.         Thought Content: Thought content normal.         Judgment: Judgment normal.        Lab Results   Component Value Date    WBC 6.46 10/13/2023    HGB 14.9 10/13/2023    HCT 44.1 10/13/2023     10/13/2023    CHOL 201 (H) 10/13/2023    TRIG 87 10/13/2023    HDL 58 10/13/2023    ALT 34 10/13/2023    AST 21 10/13/2023     10/13/2023    K 4.2 10/13/2023     10/13/2023    CREATININE 1.0 10/13/2023    BUN 16 10/13/2023    CO2 26 10/13/2023    TSH 1.863 10/13/2023    INR 1.0 09/20/2022    HGBA1C 5.0 10/13/2023     Assessment/plan:     Maria Dolores Tamayo is a 53 y.o.female with:    Routine general medical examination at a health care facility     Health Maintenance   Topic Date Due    TETANUS VACCINE  Never done-discuss that she can get at a local pharmacy    Colorectal Cancer Screening  Patient has had a colonoscopy in the remote past.  She is scheduled for a virtual visit with a gastroenterology nurse to set up for her next colonoscopy.    Shingles Vaccine (1 of 2) Never done-patient will call in the future to set up her 2 dose shingles vaccine series here at [facility].    High Dose Statin  10/27/2023 (Originally 2/4/1990)    Mammogram  05/26/2024    Lipid Panel  10/13/2028    Hepatitis C Screening  Completed     Health Maintenance Reviewed    History of atrial fibrillation  Comments:  No evidence of future episodes of atrial fib since ablation in October of 2022.  Patient does remain on Eliquis and will follow-up with Dr Cervantes by the end of this year.  Orders:  -     apixaban (ELIQUIS) 2.5 mg Tab; Take 1 tablet (2.5 mg  total) by mouth 2 (two) times daily.  Dispense: 180 tablet; Refill: 1    Postmenopausal  Uterine leiomyoma, There are 2 fibroids measuring 6.8 and 3.1 cm   I do not see any problem with starting bioidentical hormone replacement therapy at this time.    Colon cancer screening  -     Ambulatory referral/consult to [facility] Procedure ; Future; Expected date: 10/14/2023        Future Appointments   Date Time Provider Department Center   2/26/2024 10:45 AM Ronnie Bermudez MD [facility] GYN [facility]   5/30/2024 10:00 AM [facility] MAMMO1 [facility] M[facility]MO [facility]       Rebecca Yoder MD  [facility]

## 2023-10-17 ENCOUNTER — PATIENT MESSAGE (OUTPATIENT)
Dept: ELECTROPHYSIOLOGY | Facility: CLINIC | Age: 53
End: 2023-10-17
Payer: COMMERCIAL

## 2023-10-17 ENCOUNTER — PATIENT MESSAGE (OUTPATIENT)
Dept: ADMINISTRATIVE | Facility: HOSPITAL | Age: 53
End: 2023-10-17
Payer: COMMERCIAL

## 2023-10-31 DIAGNOSIS — I48.0 PAROXYSMAL ATRIAL FIBRILLATION: Primary | ICD-10-CM

## 2023-11-08 ENCOUNTER — PATIENT MESSAGE (OUTPATIENT)
Dept: INTERNAL MEDICINE | Facility: CLINIC | Age: 53
End: 2023-11-08
Payer: COMMERCIAL

## 2023-11-08 RX ORDER — AMOXICILLIN AND CLAVULANATE POTASSIUM 875; 125 MG/1; MG/1
1 TABLET, FILM COATED ORAL EVERY 12 HOURS
Qty: 14 TABLET | Refills: 0 | Status: SHIPPED | OUTPATIENT
Start: 2023-11-08 | End: 2023-11-15

## 2023-11-14 ENCOUNTER — PATIENT MESSAGE (OUTPATIENT)
Dept: ADMINISTRATIVE | Facility: HOSPITAL | Age: 53
End: 2023-11-14
Payer: COMMERCIAL

## 2024-01-04 ENCOUNTER — HOSPITAL ENCOUNTER (OUTPATIENT)
Dept: CARDIOLOGY | Facility: CLINIC | Age: 54
Discharge: HOME | End: 2024-01-04
Payer: COMMERCIAL

## 2024-01-04 ENCOUNTER — OFFICE VISIT (OUTPATIENT)
Dept: ELECTROPHYSIOLOGY | Facility: CLINIC | Age: 54
End: 2024-01-04
Payer: COMMERCIAL

## 2024-01-04 VITALS
SYSTOLIC BLOOD PRESSURE: 125 MMHG | BODY MASS INDEX: 24.96 KG/M2 | HEART RATE: 66 BPM | DIASTOLIC BLOOD PRESSURE: 56 MMHG | HEIGHT: 64 IN | WEIGHT: 146.19 LBS

## 2024-01-04 DIAGNOSIS — Z87.42 HISTORY OF ABNORMAL UTERINE BLEEDING: ICD-10-CM

## 2024-01-04 DIAGNOSIS — I48.0 PAROXYSMAL ATRIAL FIBRILLATION: ICD-10-CM

## 2024-01-04 DIAGNOSIS — E66.3 OVERWEIGHT (BMI 25.0-29.9): ICD-10-CM

## 2024-01-04 DIAGNOSIS — K57.92 DIVERTICULITIS: ICD-10-CM

## 2024-01-04 DIAGNOSIS — I48.0 PAROXYSMAL ATRIAL FIBRILLATION: Primary | ICD-10-CM

## 2024-01-04 DIAGNOSIS — N93.8 DYSFUNCTIONAL UTERINE BLEEDING: ICD-10-CM

## 2024-01-04 DIAGNOSIS — I42.8 NONISCHEMIC CARDIOMYOPATHY: ICD-10-CM

## 2024-01-04 DIAGNOSIS — I63.40 CEREBROVASCULAR ACCIDENT (CVA) DUE TO EMBOLISM OF CEREBRAL ARTERY: ICD-10-CM

## 2024-01-04 DIAGNOSIS — I50.42 CHRONIC COMBINED SYSTOLIC AND DIASTOLIC HEART FAILURE: ICD-10-CM

## 2024-01-04 DIAGNOSIS — Z86.73 HISTORY OF CEREBROVASCULAR ACCIDENT (CVA) FROM LEFT CAROTID ARTERY OCCLUSION INVOLVING LEFT MIDDLE CEREBRAL ARTERY TERRITORY: ICD-10-CM

## 2024-01-04 DIAGNOSIS — Z98.890 H/O CARDIAC RADIOFREQUENCY ABLATION: ICD-10-CM

## 2024-01-04 DIAGNOSIS — I48.91 ATRIAL FIBRILLATION WITH RAPID VENTRICULAR RESPONSE: ICD-10-CM

## 2024-01-04 DIAGNOSIS — Z86.79 HX OF MYOCARDITIS: ICD-10-CM

## 2024-01-04 DIAGNOSIS — I48.91 ATRIAL FIBRILLATION WITH RVR: ICD-10-CM

## 2024-01-04 DIAGNOSIS — Z79.01 CURRENT USE OF LONG TERM ANTICOAGULATION: ICD-10-CM

## 2024-01-04 DIAGNOSIS — D25.9 UTERINE LEIOMYOMA, UNSPECIFIED LOCATION: ICD-10-CM

## 2024-01-04 PROCEDURE — 99215 OFFICE O/P EST HI 40 MIN: CPT | Mod: S$GLB,,, | Performed by: STUDENT IN AN ORGANIZED HEALTH CARE EDUCATION/TRAINING PROGRAM

## 2024-01-04 PROCEDURE — 3074F SYST BP LT 130 MM HG: CPT | Mod: CPTII,S$GLB,, | Performed by: STUDENT IN AN ORGANIZED HEALTH CARE EDUCATION/TRAINING PROGRAM

## 2024-01-04 PROCEDURE — 93010 ELECTROCARDIOGRAM REPORT: CPT | Mod: S$GLB,,, | Performed by: INTERNAL MEDICINE

## 2024-01-04 PROCEDURE — 3078F DIAST BP <80 MM HG: CPT | Mod: CPTII,S$GLB,, | Performed by: STUDENT IN AN ORGANIZED HEALTH CARE EDUCATION/TRAINING PROGRAM

## 2024-01-04 PROCEDURE — 93005 ELECTROCARDIOGRAM TRACING: CPT | Mod: S$GLB,,, | Performed by: STUDENT IN AN ORGANIZED HEALTH CARE EDUCATION/TRAINING PROGRAM

## 2024-01-04 PROCEDURE — 3008F BODY MASS INDEX DOCD: CPT | Mod: CPTII,S$GLB,, | Performed by: STUDENT IN AN ORGANIZED HEALTH CARE EDUCATION/TRAINING PROGRAM

## 2024-01-04 PROCEDURE — 99999 PR PBB SHADOW E&M-EST. PATIENT-LVL II: CPT | Mod: PBBFAC,,, | Performed by: STUDENT IN AN ORGANIZED HEALTH CARE EDUCATION/TRAINING PROGRAM

## 2024-01-04 NOTE — PROGRESS NOTES
Electrophysiology Clinic Note    Reason for follow-up patient visit: Ongoing evaluation and recommendations regarding paroxysmal atrial fibrillation, s/p successful radiofrequency catheter ablation with pulmonary vein isolation on 10/5/2022.     PRESENTING HISTORY:     History of Present Illness:  Ms. Maria Dolores Tamayo is a alisa 53-year-old woman who returns to clinic today for ongoing evaluation and recommendations regarding paroxysmal atrial fibrillation, s/p successful radiofrequency catheter ablation with pulmonary vein isolation on 10/5/2022. 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 She was previously admitted from 4/22-25/2022 from her rehabilitation center with several alerts from her LifeVest, consistent with multiple events of atrial fibrillation with RVR. She returns to clinic today for ongoing evaluation following her successful radiofrequency catheter ablation with pulmonary vein isolation.     In discussion with Ms. Tamayo, she tells me that she is feeling overall quite well and has recovered fully following her prior ablation. She feels that she has returned to her previous baseline level of health with the exception of mild word-searching difficulty following her prior stroke. She denies any recurrent symptoms of palpitations following her ablation. She was previously maintained on amiodarone 200mg po daily, but following her successful PVI, we obtained an ambulatory event monitor after the blanking period revealing no recurrent atrial fibrillation, and this antiarrhythmic agent was successfully discontinued. She has a [employer] andrea  and denies any recurrent episodes of atrial fibrillation, and denies any symptoms of racing heart rates or recurrent palpitations. While previously on amiodarone therapy, she reported symptoms of vivid dreams and fatigue on this medication. Since discontinuation these side effects have entirely resolved. She has had recovery of her systolic function, with most recent LVEF 55%. She has been vigilant to wear her Apple watch, and denies any further episodes of atrial fibrillation on her Apple watch. She is very vigilant to continue her apixaban 2.5mg po BID. Her dose was decreased from 5mg po BID following significant uterine bleeding, and she denies any subsequent events of bleeding since reducing her dose of apixaban. She remains very concerned about increased uterine bleeding in the future as she is going through menopause presently. She remains in sinus rhythm on all subsequent ECGs, with no evidence of prolonged QT, nonsustained or sustained VT. 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 I called her outside hospital in an effort to obtain strips of her prior episode of WCT. Unfortunately, this event occurred in the CT scanner and no strips or ECGs were recorded. Currently, she has no indication for ICD implantation. We previously discussed obtaining a cardiac MRI in order to further assess for possible VT substrate in the future; however, as she has not had any subsequent events of arrhythmia following her successful ablation, we will continue to monitor at this time.     She denies any episodes of dizziness, lightheadedness, syncope/presyncope, chest pain or chest discomfort, palpitations, nausea or vomiting, orthopnea, lower extremity edema, or PND. She reports baseline mild shortness of breath and dyspnea with exertion that she feels has remained stable for her. She reports that she remains on Mounjaro and has been making excellent progress with  weight loss. She can climb more than one flight of stairs prior to needing to take a break and remains very physically active without reported limitation.     Review of Systems:  Review of Systems   Constitutional:  Negative for activity change.   HENT:  Negative for nasal congestion, nosebleeds, postnasal drip, rhinorrhea, sinus pressure/congestion, sneezing and sore throat.    Eyes:  Negative for visual disturbance.   Respiratory:  Positive for shortness of breath. Negative for apnea, cough, chest tightness and wheezing.    Cardiovascular:  Negative for chest pain, palpitations, leg swelling and claudication.   Gastrointestinal:  Negative for abdominal distention, abdominal pain, blood in stool, change in bowel habit, constipation, diarrhea, nausea and vomiting.   Genitourinary:  Negative for dysuria and hematuria.   Musculoskeletal:  Negative for gait problem.   Neurological:  Positive for speech difficulty. Negative for dizziness, seizures, syncope, weakness, light-headedness, headaches and coordination difficulties.        Mild word-searching difficulty following her prior stroke.         PAST HISTORY:     Past Medical History:   Diagnosis Date    Abnormal Pap smear of cervix     Anticoagulant long-term use     CHF (congestive heart failure)     Coronary artery disease     Encounter for blood transfusion     Heart murmur     atrial fibrillation    Myocardial infarction     Stroke        Past Surgical History:   Procedure Laterality Date    ABLATION OF ARRHYTHMOGENIC FOCUS FOR ATRIAL FIBRILLATION N/A 10/05/2022    Procedure: Ablation atrial fibrillation;  Surgeon: PLACIDO Cervantes MD;  Location: [facility];  Service: Cardiology;  Laterality: N/A;  AF, ROWDY, PVI, RFA, Carto, Gen, EH, 3 Prep    AUGMENTATION OF BREAST      COSMETIC SURGERY      heart abasian  01/13/2023    UTERINE ARTERY EMBOLIZATION  2000       Family History:  Family History   Problem Relation Age of Onset    Progressive Supranuclear Palsy  "Mother 73    Arthritis Father     Cancer Maternal Aunt         ovarian    Ovarian cancer Maternal Aunt 60    Diabetes Paternal Grandmother         prediabetic    Breast cancer Neg Hx     Colon cancer Neg Hx     Uterine cancer Neg Hx        Social History:  She  reports that she has never smoked. She has never used smokeless tobacco. She reports that she does not currently use alcohol. She reports that she does not use drugs.      MEDICATIONS & ALLERGIES:     Review of patient's allergies indicates:  No Known Allergies    Current Outpatient Medications on File Prior to Visit   Medication Sig Dispense Refill    apixaban (ELIQUIS) 2.5 mg Tab Take 1 tablet (2.5 mg total) by mouth 2 (two) times daily. 180 tablet 1    ASHWAGANDHA ROOT EXTRACT ORAL Take by mouth.      ergocalciferol, vitamin D2, (VITAMIN D ORAL) Take by mouth.      furosemide (LASIX) 20 MG tablet Take 1 tablet (20 mg total) by mouth daily as needed. edema (Patient not taking: Reported on 10/13/2023) 30 tablet 0    magnesium chloride (MAG 64 ORAL) Take by mouth.      MOUNJARO 10 mg/0.5 mL PnAdonay SMARTSIG:10 Milligram(s) SUB-Q Once a Week      UNABLE TO FIND Nutrascripts pregenolone sr 100mg      UNABLE TO FIND Nutrascripts DHEA Sr 5 mg       No current facility-administered medications on file prior to visit.        OBJECTIVE:     Vital Signs:  BP (!) 125/56   Pulse 66   Ht 5' 4" (1.626 m)   Wt 66.3 kg (146 lb 2.6 oz)   BMI 25.09 kg/m²     Physical Exam:  Physical Exam  Constitutional:       General: She is not in acute distress.     Appearance: Normal appearance. She is not ill-appearing or diaphoretic.      Comments: Well-appearing woman in NAD.   HENT:      Head: Normocephalic and atraumatic.      Nose: Nose normal.      Mouth/Throat:      Mouth: Mucous membranes are moist.      Pharynx: Oropharynx is clear.   Eyes:      Pupils: Pupils are equal, round, and reactive to light.   Cardiovascular:      Rate and Rhythm: Normal rate and regular rhythm.      " Pulses: Normal pulses.      Heart sounds: Normal heart sounds. No murmur heard.     No friction rub. No gallop.   Pulmonary:      Effort: Pulmonary effort is normal. No respiratory distress.      Breath sounds: Normal breath sounds. No wheezing, rhonchi or rales.   Chest:      Chest wall: No tenderness.   Abdominal:      General: There is no distension.      Palpations: Abdomen is soft.      Tenderness: There is no abdominal tenderness.   Musculoskeletal:         General: No swelling or tenderness.      Cervical back: Normal range of motion.      Right lower leg: No edema.      Left lower leg: No edema.   Skin:     General: Skin is warm and dry.      Findings: No erythema, lesion or rash.   Neurological:      General: No focal deficit present.      Mental Status: She is alert and oriented to person, place, and time. Mental status is at baseline.      Motor: No weakness.      Gait: Gait normal.   Psychiatric:         Mood and Affect: Mood normal.         Behavior: Behavior normal.        Laboratory Data:  Lab Results   Component Value Date    WBC 6.46 10/13/2023    HGB 14.9 10/13/2023    HCT 44.1 10/13/2023    MCV 89 10/13/2023     10/13/2023     Lab Results   Component Value Date    GLU 95 10/13/2023     10/13/2023    K 4.2 10/13/2023     10/13/2023    CO2 26 10/13/2023    BUN 16 10/13/2023    CREATININE 1.0 10/13/2023    CALCIUM 10.3 10/13/2023    MG 2.0 07/30/2022     Lab Results   Component Value Date    INR 1.0 09/20/2022       Pertinent Cardiac Data:  ECG: Normal sinus rhythm with rate of 66 bpm,  ms, QRS 74 ms, QT/QTc 410/429 ms, low amplitude precordial leads.     Resting 2D Transthoracic Echocardiogram - 4/23/2022:  The left ventricle is normal in size with eccentric hypertrophy and normal systolic function. The estimated ejection fraction is 55%.  Normal right ventricular size with normal right ventricular systolic function.  Mild left atrial enlargement.  Normal central venous  pressure (3 mmHg).  Atrial fibrillation present throughout the examination.     EPS with RFA PVI - 10/5/2022:    Successful radiofrequency ablation with pulmonary vein isolation (PVI).    3D mapping performed with Carto 3.    Electrophysiology study with coronary sinus pacing.    His bundle recording.    Intracardiac echo.    48-Hour Holter Monitor - 2/22/2023:  Baseline rhythm was sinus bradycardia with normal intervals and an average heart rate of 58 bpm.  There were no reported symptoms.  There were very rare PVCs with an overall PVC burden of 0%. There were very rare PACs with an overall PAC burden of 0%.  There were no atrial or ventricular arrhythmias. There was no evidence of recurrent atrial fibrillation.      ASSESSMENT & PLAN:   Ms. Maria Dolores Tamayo is a alisa 53-year-old woman who returns to clinic today for ongoing evaluation and recommendations regarding paroxysmal atrial fibrillation, s/p successful radiofrequency catheter ablation with pulmonary vein isolation on 10/5/2022. 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 She was previously admitted from 4/22-25/2022 from her rehabilitation center with several alerts from her LifeVest, consistent with multiple events of atrial fibrillation with RVR. She returns to clinic today for ongoing evaluation following her successful radiofrequency catheter ablation with pulmonary vein isolation.     - We continue to discuss the pathophysiology of atrial fibrillation; specifically, we discussed paroxysmal atrial fibrillation and the concept that some patients may experience paroxysms of atrial  fibrillation interrupting periods of sinus rhythm. We discussed that the biggest risk associated with atrial fibrillation is an increased risk of CVA.  - Following her successful pulmonary vein isolation procedure she denies any subsequent episodes of atrial fibrillation on both symptom review and on her Apple watch recordings. We reviewed the results of her subsequent 48-hour Holter monitor that did not reveal any events of recurrent atrial fibrillation.   - She has been successfully discontinued from amiodarone 200mg po daily with maintenance of sinus rhythm. She previously reported vivid dreams and increased fatigue on this agent, and as she is now beyond the blanking period following her successful PVI, we have discontinued this agent.  - She has been discontinued from her lisinopril therapy as her blood pressures remain at goal off of any antihypertensive agent.    - Her [id number] is 3 (prior CVA, female gender, age less than 64), portending an annual adjusted risk of CVA of 3.2%. She remains on uninterrupted apixaban 2.5mg po BID. This dose was reduced from her previous 5mg po BID secondary to significant uterine bleeding. She is very interested in undergoing a WATCHMAN left atrial appendage occlusion procedure in an effort to discontinue indefinite anticoagulation given her adverse bleeding. Her HAS-BLED score is elevated at 4, portending an annual adjusted risk of adverse bleeding of 8.7%, with her risk of recurrent adverse bleeding outweighing her risk of CVA. We discussed the procedure of undergoing a WATCHMAN in detail, in addition to review of the potential risks, benefits, and alternative treatment options. Ms. Tamayo voices understanding and is in agreement with proceeding. Informed consent was obtained. She will remain on dose-reduced apixaban before and following her WATCHMAN until an adequate seal can be confirmed on ROWDY.  - She has recovery of her systolic function with no recorded VT. My  "suspicion was that her prior episode of reported "wide complex tachycardia" was actually aberrant atrial fibrillation with RVR at OSH rather than true VT - this event occurred in a CT scanner with no tracings available to confirm. We discussed obtaining a cardiac MRI following her ablation for complete assessment of LGE for risk stratification for potential VT; however, she has not had any subsequent palpitations, and she would prefer to defer this imaging at this time.   - Possible underlying drivers of atrial fibrillation were addressed at this appointment, including recommendations for maintenance of a healthy BMI - now a class I recommendation. Review of laboratory data revealed acceptable TSH at 1.863. She denies any loud snoring or excessive daytime sleepiness.    - She will continue to follow with her PCP and neurology for ongoing management of her comorbid conditions.     This patient will present to the EP laboratory to undergo implantation of a WATCHMAN left atrial appendage occlusion device. She will remain on apixaban oral anticoagulation. All questions and concerns were addressed at this encounter.     Signing Physician:       DEZ Cervantes MD  Electrophysiology Attending          "

## 2024-01-04 NOTE — Clinical Note
Hey y'all,  This patient will require a WATCHMAN procedure. She is in a [place] and would like to schedule this following Olvin Keller if possible.   Thank you!!

## 2024-01-06 PROBLEM — Z87.42 HISTORY OF ABNORMAL UTERINE BLEEDING: Status: ACTIVE | Noted: 2024-01-06

## 2024-01-11 ENCOUNTER — TELEPHONE (OUTPATIENT)
Dept: ELECTROPHYSIOLOGY | Facility: CLINIC | Age: 54
End: 2024-01-11
Payer: COMMERCIAL

## 2024-01-11 NOTE — TELEPHONE ENCOUNTER
Spoke with patient and reviewed February dates and she will review with her 's schedule and call me back. Rebecca Man RN    Spoke with patient again and 2/15 has been confirmed for Watchman. Procedure will be scheduled. Rebecca Man RN

## 2024-01-12 ENCOUNTER — CLINICAL SUPPORT (OUTPATIENT)
Dept: ENDOSCOPY | Facility: HOSPITAL | Age: 54
End: 2024-01-12
Attending: INTERNAL MEDICINE
Payer: COMMERCIAL

## 2024-01-12 ENCOUNTER — TELEPHONE (OUTPATIENT)
Dept: ENDOSCOPY | Facility: HOSPITAL | Age: 54
End: 2024-01-12

## 2024-01-12 DIAGNOSIS — I48.91 ATRIAL FIBRILLATION WITH RVR: Primary | ICD-10-CM

## 2024-01-12 DIAGNOSIS — Z79.01 CURRENT USE OF LONG TERM ANTICOAGULATION: ICD-10-CM

## 2024-01-12 DIAGNOSIS — Z12.11 COLON CANCER SCREENING: ICD-10-CM

## 2024-01-12 NOTE — PLAN OF CARE
Called patient for PAT appointment to schedule colonoscopy. No availability on schedule at [facility] or [place] at this time. Patient will be contacted when May schedule is available.

## 2024-01-12 NOTE — TELEPHONE ENCOUNTER
Called patient for PAT appointment to schedule colonoscopy. No availability on schedule at [facility] or [place] at this time. Patient will be contacted when May schedule is available. Patient verbalized understanding.

## 2024-01-19 ENCOUNTER — PATIENT MESSAGE (OUTPATIENT)
Dept: ELECTROPHYSIOLOGY | Facility: CLINIC | Age: 54
End: 2024-01-19
Payer: COMMERCIAL

## 2024-01-19 DIAGNOSIS — Z79.01 CURRENT USE OF LONG TERM ANTICOAGULATION: ICD-10-CM

## 2024-01-19 DIAGNOSIS — I48.0 PAROXYSMAL ATRIAL FIBRILLATION: Primary | ICD-10-CM

## 2024-02-08 ENCOUNTER — LAB VISIT (OUTPATIENT)
Dept: LAB | Facility: HOSPITAL | Age: 54
End: 2024-02-08
Attending: STUDENT IN AN ORGANIZED HEALTH CARE EDUCATION/TRAINING PROGRAM
Payer: COMMERCIAL

## 2024-02-08 DIAGNOSIS — I48.0 PAROXYSMAL ATRIAL FIBRILLATION: ICD-10-CM

## 2024-02-08 DIAGNOSIS — Z79.01 CURRENT USE OF LONG TERM ANTICOAGULATION: ICD-10-CM

## 2024-02-08 LAB
ANION GAP SERPL CALC-SCNC: 6 MMOL/L (ref 8–16)
APTT PPP: 28.2 SEC (ref 21–32)
BUN SERPL-MCNC: 15 MG/DL (ref 6–20)
CALCIUM SERPL-MCNC: 9.7 MG/DL (ref 8.7–10.5)
CHLORIDE SERPL-SCNC: 109 MMOL/L (ref 95–110)
CO2 SERPL-SCNC: 28 MMOL/L (ref 23–29)
CREAT SERPL-MCNC: 0.8 MG/DL (ref 0.5–1.4)
ERYTHROCYTE [DISTWIDTH] IN BLOOD BY AUTOMATED COUNT: 12.8 % (ref 11.5–14.5)
EST. GFR  (NO RACE VARIABLE): >60 ML/MIN/1.73 M^2
GLUCOSE SERPL-MCNC: 92 MG/DL (ref 70–110)
HCT VFR BLD AUTO: 39.6 % (ref 37–48.5)
HGB BLD-MCNC: 13.1 G/DL (ref 12–16)
INR PPP: 1 (ref 0.8–1.2)
MCH RBC QN AUTO: 29 PG (ref 27–31)
MCHC RBC AUTO-ENTMCNC: 33.1 G/DL (ref 32–36)
MCV RBC AUTO: 88 FL (ref 82–98)
PLATELET # BLD AUTO: 233 K/UL (ref 150–450)
PMV BLD AUTO: 10.4 FL (ref 9.2–12.9)
POTASSIUM SERPL-SCNC: 4 MMOL/L (ref 3.5–5.1)
PROTHROMBIN TIME: 10.3 SEC (ref 9–12.5)
RBC # BLD AUTO: 4.51 M/UL (ref 4–5.4)
SODIUM SERPL-SCNC: 143 MMOL/L (ref 136–145)
WBC # BLD AUTO: 6.28 K/UL (ref 3.9–12.7)

## 2024-02-08 PROCEDURE — 85730 THROMBOPLASTIN TIME PARTIAL: CPT | Performed by: STUDENT IN AN ORGANIZED HEALTH CARE EDUCATION/TRAINING PROGRAM

## 2024-02-08 PROCEDURE — 85610 PROTHROMBIN TIME: CPT | Performed by: STUDENT IN AN ORGANIZED HEALTH CARE EDUCATION/TRAINING PROGRAM

## 2024-02-08 PROCEDURE — 85027 COMPLETE CBC AUTOMATED: CPT | Performed by: STUDENT IN AN ORGANIZED HEALTH CARE EDUCATION/TRAINING PROGRAM

## 2024-02-08 PROCEDURE — 80048 BASIC METABOLIC PNL TOTAL CA: CPT | Performed by: STUDENT IN AN ORGANIZED HEALTH CARE EDUCATION/TRAINING PROGRAM

## 2024-02-08 PROCEDURE — 36415 COLL VENOUS BLD VENIPUNCTURE: CPT | Performed by: STUDENT IN AN ORGANIZED HEALTH CARE EDUCATION/TRAINING PROGRAM

## 2024-02-14 ENCOUNTER — PATIENT MESSAGE (OUTPATIENT)
Dept: ELECTROPHYSIOLOGY | Facility: CLINIC | Age: 54
End: 2024-02-14
Payer: COMMERCIAL

## 2024-02-20 ENCOUNTER — PATIENT MESSAGE (OUTPATIENT)
Dept: ADMINISTRATIVE | Facility: HOSPITAL | Age: 54
End: 2024-02-20
Payer: COMMERCIAL

## 2024-02-21 ENCOUNTER — TELEPHONE (OUTPATIENT)
Dept: ELECTROPHYSIOLOGY | Facility: CLINIC | Age: 54
End: 2024-02-21
Payer: COMMERCIAL

## 2024-02-21 NOTE — TELEPHONE ENCOUNTER
----- Message from Arlene Rodrigues MA sent at 2/21/2024  9:26 AM CST -----  Contact: self  Pt is returning your call.Please call [phone].Also she sent a message to the portal,not there.     Spoke with Ms Huffman procedure has been scheduled for 3/21/24. Instructions will be sent to the portal. Rebecca Man RN

## 2024-02-23 DIAGNOSIS — I48.91 ATRIAL FIBRILLATION WITH RVR: Primary | ICD-10-CM

## 2024-02-26 ENCOUNTER — OFFICE VISIT (OUTPATIENT)
Dept: GYNECOLOGIC ONCOLOGY | Facility: CLINIC | Age: 54
End: 2024-02-26
Payer: COMMERCIAL

## 2024-02-26 VITALS
HEART RATE: 78 BPM | DIASTOLIC BLOOD PRESSURE: 51 MMHG | SYSTOLIC BLOOD PRESSURE: 110 MMHG | WEIGHT: 141 LBS | BODY MASS INDEX: 24.2 KG/M2

## 2024-02-26 DIAGNOSIS — N93.8 DYSFUNCTIONAL UTERINE BLEEDING: Primary | ICD-10-CM

## 2024-02-26 DIAGNOSIS — Z79.01 CURRENT USE OF LONG TERM ANTICOAGULATION: ICD-10-CM

## 2024-02-26 PROCEDURE — 3078F DIAST BP <80 MM HG: CPT | Mod: CPTII,S$GLB,, | Performed by: OBSTETRICS & GYNECOLOGY

## 2024-02-26 PROCEDURE — 99999 PR PBB SHADOW E&M-EST. PATIENT-LVL III: CPT | Mod: PBBFAC,,, | Performed by: OBSTETRICS & GYNECOLOGY

## 2024-02-26 PROCEDURE — 3008F BODY MASS INDEX DOCD: CPT | Mod: CPTII,S$GLB,, | Performed by: OBSTETRICS & GYNECOLOGY

## 2024-02-26 PROCEDURE — 1159F MED LIST DOCD IN RCRD: CPT | Mod: CPTII,S$GLB,, | Performed by: OBSTETRICS & GYNECOLOGY

## 2024-02-26 PROCEDURE — 1160F RVW MEDS BY RX/DR IN RCRD: CPT | Mod: CPTII,S$GLB,, | Performed by: OBSTETRICS & GYNECOLOGY

## 2024-02-26 PROCEDURE — 99214 OFFICE O/P EST MOD 30 MIN: CPT | Mod: S$GLB,,, | Performed by: OBSTETRICS & GYNECOLOGY

## 2024-02-26 PROCEDURE — 3074F SYST BP LT 130 MM HG: CPT | Mod: CPTII,S$GLB,, | Performed by: OBSTETRICS & GYNECOLOGY

## 2024-02-26 RX ORDER — PROGESTERONE 200 MG/1
200 CAPSULE ORAL NIGHTLY
COMMUNITY
Start: 2024-01-10

## 2024-02-26 NOTE — PROGRESS NOTES
Subjective:      Patient ID: Maria Dolores Tamayo is a 54 y.o. female.    Chief Complaint: Follow-up        HPI  Here today for f/u of DUB and fibroids.  No bleeding.  May be able to come off of anticoagulation.  Started testosterone injections  Review of Systems   Constitutional:  Negative for activity change, appetite change, chills, fatigue and fever.   HENT:  Negative for hearing loss, mouth sores, nosebleeds, sore throat and tinnitus.    Eyes:  Negative for visual disturbance.   Respiratory:  Negative for cough, chest tightness, shortness of breath and wheezing.    Cardiovascular:  Negative for chest pain and leg swelling.   Gastrointestinal:  Negative for abdominal distention, abdominal pain, blood in stool, constipation, diarrhea, nausea and vomiting.   Genitourinary:  Negative for dysuria, flank pain, frequency, hematuria, pelvic pain, vaginal bleeding, vaginal discharge and vaginal pain.   Musculoskeletal:  Negative for arthralgias and back pain.   Skin:  Negative for rash.   Neurological:  Negative for dizziness, seizures, syncope, weakness and numbness.   Hematological:  Does not bruise/bleed easily.   Psychiatric/Behavioral:  Negative for confusion and sleep disturbance. The patient is not nervous/anxious.        Objective:   Physical Exam:   Constitutional: She is oriented to person, place, and time. She appears well-developed and well-nourished. No distress.    HENT:   Head: Normocephalic and atraumatic.    Eyes: No scleral icterus.     Cardiovascular:       Exam reveals no cyanosis and no edema.        Pulmonary/Chest: Effort normal. No respiratory distress.        Abdominal: Soft. She exhibits no distension and no fluid wave. There is no abdominal tenderness. There is no rigidity.     Genitourinary:    Uterus normal.      Pelvic exam was performed with patient supine.   There is no rash, tenderness or lesion on the right labia. There is no rash, tenderness or lesion on the left labia. Cervix is  normal. Right adnexum displays no mass, no tenderness and no fullness. Left adnexum displays no mass, no tenderness and no fullness. There is bleeding (scant w discharge) in the vagina. No vaginal discharge in the vagina. Uterus is not enlarged, not fixed, not tender and not hosting fibroids.           Musculoskeletal: Normal range of motion and moves all extremeties. No edema.       Neurological: She is alert and oriented to person, place, and time.    Skin: Skin is warm. No rash noted. No cyanosis or erythema.    Psychiatric: She has a normal mood and affect. Thought content normal.       Assessment:     1. Dysfunctional uterine bleeding    2. Current use of long term anticoagulation        Plan:       Doing well overall.  RTC 6 months.

## 2024-03-04 ENCOUNTER — PATIENT MESSAGE (OUTPATIENT)
Dept: ELECTROPHYSIOLOGY | Facility: CLINIC | Age: 54
End: 2024-03-04
Payer: COMMERCIAL

## 2024-03-13 ENCOUNTER — LAB VISIT (OUTPATIENT)
Dept: LAB | Facility: HOSPITAL | Age: 54
End: 2024-03-13
Attending: STUDENT IN AN ORGANIZED HEALTH CARE EDUCATION/TRAINING PROGRAM
Payer: COMMERCIAL

## 2024-03-13 DIAGNOSIS — I48.91 ATRIAL FIBRILLATION WITH RVR: ICD-10-CM

## 2024-03-13 LAB
ANION GAP SERPL CALC-SCNC: 8 MMOL/L (ref 8–16)
APTT PPP: 26.8 SEC (ref 21–32)
BUN SERPL-MCNC: 16 MG/DL (ref 6–20)
CALCIUM SERPL-MCNC: 9.6 MG/DL (ref 8.7–10.5)
CHLORIDE SERPL-SCNC: 108 MMOL/L (ref 95–110)
CO2 SERPL-SCNC: 25 MMOL/L (ref 23–29)
CREAT SERPL-MCNC: 0.8 MG/DL (ref 0.5–1.4)
ERYTHROCYTE [DISTWIDTH] IN BLOOD BY AUTOMATED COUNT: 12.6 % (ref 11.5–14.5)
EST. GFR  (NO RACE VARIABLE): >60 ML/MIN/1.73 M^2
GLUCOSE SERPL-MCNC: 87 MG/DL (ref 70–110)
HCT VFR BLD AUTO: 43 % (ref 37–48.5)
HGB BLD-MCNC: 13.7 G/DL (ref 12–16)
INR PPP: 1 (ref 0.8–1.2)
MCH RBC QN AUTO: 29 PG (ref 27–31)
MCHC RBC AUTO-ENTMCNC: 31.9 G/DL (ref 32–36)
MCV RBC AUTO: 91 FL (ref 82–98)
PLATELET # BLD AUTO: 256 K/UL (ref 150–450)
PMV BLD AUTO: 11.3 FL (ref 9.2–12.9)
POTASSIUM SERPL-SCNC: 3.9 MMOL/L (ref 3.5–5.1)
PROTHROMBIN TIME: 10.5 SEC (ref 9–12.5)
RBC # BLD AUTO: 4.72 M/UL (ref 4–5.4)
SODIUM SERPL-SCNC: 141 MMOL/L (ref 136–145)
WBC # BLD AUTO: 6.33 K/UL (ref 3.9–12.7)

## 2024-03-13 PROCEDURE — 36415 COLL VENOUS BLD VENIPUNCTURE: CPT | Performed by: STUDENT IN AN ORGANIZED HEALTH CARE EDUCATION/TRAINING PROGRAM

## 2024-03-13 PROCEDURE — 80048 BASIC METABOLIC PNL TOTAL CA: CPT | Performed by: STUDENT IN AN ORGANIZED HEALTH CARE EDUCATION/TRAINING PROGRAM

## 2024-03-13 PROCEDURE — 85027 COMPLETE CBC AUTOMATED: CPT | Performed by: STUDENT IN AN ORGANIZED HEALTH CARE EDUCATION/TRAINING PROGRAM

## 2024-03-13 PROCEDURE — 85610 PROTHROMBIN TIME: CPT | Performed by: STUDENT IN AN ORGANIZED HEALTH CARE EDUCATION/TRAINING PROGRAM

## 2024-03-13 PROCEDURE — 85730 THROMBOPLASTIN TIME PARTIAL: CPT | Performed by: STUDENT IN AN ORGANIZED HEALTH CARE EDUCATION/TRAINING PROGRAM

## 2024-03-20 ENCOUNTER — TELEPHONE (OUTPATIENT)
Dept: ELECTROPHYSIOLOGY | Facility: CLINIC | Age: 54
End: 2024-03-20
Payer: COMMERCIAL

## 2024-03-20 NOTE — TELEPHONE ENCOUNTER
Message left on "[employer] to return call to review procedure scheduled for tomorrow. Rebecca gruber RN

## 2024-03-20 NOTE — TELEPHONE ENCOUNTER
Reviewed instructions for tomorrow's Watchman procedure and sh has confirmed that she has not taken Mounjara for the last 3 weeks. Rebecca gruber RN    ----- Message from Malu James RN sent at 3/20/2024  9:55 AM CDT -----  Regarding: FW: Returning Call    ----- Message -----  From: Gerard Reed  Sent: 3/20/2024   9:53 AM CDT  To: [facility] Staff  Subject: Returning Call                                   Pt returning your call    Contact @ [phone]    Thanks

## 2024-03-20 NOTE — TELEPHONE ENCOUNTER
Spoke to patient    CONFIRMED procedure arrival time of 8:00 AM on 3/21/24 for 10 AM procedure    Reiterated instructions including:  -Directions to check in desk  -NPO after midnight night prior to procedure  -High importance of HOLDING Eliquis the morning of the procedure  -Pre-procedure LABS were reviewed and no alerts noted.  -Confirmed absence  of implanted device/stimulator   -Confirmed no fever, cough, or shortness of breath in the past 30 days  -Confirmed no redness, rash, irritation, or yeast infection to groin area.   --Do not wear mascara day of procedure  -Reviewed current visitor policy    Patient verbalized understanding of above and appreciated the call. ----- Message from Malu James RN sent at 3/20/2024  9:55 AM CDT -----  Regarding: FW: Returning Call    ----- Message -----  From: Gerard Reed  Sent: 3/20/2024   9:53 AM CDT  To: Billy Morton Staff  Subject: Returning Call                                   Pt returning your call    Contact @ [phone]    Thanks

## 2024-03-21 ENCOUNTER — ANESTHESIA (OUTPATIENT)
Dept: MEDSURG UNIT | Facility: HOSPITAL | Age: 54
DRG: 274 | End: 2024-03-21
Payer: COMMERCIAL

## 2024-03-21 ENCOUNTER — ANESTHESIA EVENT (OUTPATIENT)
Dept: MEDSURG UNIT | Facility: HOSPITAL | Age: 54
DRG: 274 | End: 2024-03-21
Payer: COMMERCIAL

## 2024-03-21 ENCOUNTER — HOSPITAL ENCOUNTER (INPATIENT)
Facility: HOSPITAL | Age: 54
LOS: 1 days | Discharge: HOME | DRG: 274 | End: 2024-03-21
Attending: STUDENT IN AN ORGANIZED HEALTH CARE EDUCATION/TRAINING PROGRAM | Admitting: STUDENT IN AN ORGANIZED HEALTH CARE EDUCATION/TRAINING PROGRAM
Payer: COMMERCIAL

## 2024-03-21 VITALS
RESPIRATION RATE: 16 BRPM | HEIGHT: 64 IN | TEMPERATURE: 98 F | DIASTOLIC BLOOD PRESSURE: 59 MMHG | SYSTOLIC BLOOD PRESSURE: 113 MMHG | WEIGHT: 140 LBS | BODY MASS INDEX: 23.9 KG/M2 | OXYGEN SATURATION: 100 % | HEART RATE: 56 BPM

## 2024-03-21 DIAGNOSIS — I48.0 PAROXYSMAL ATRIAL FIBRILLATION: ICD-10-CM

## 2024-03-21 DIAGNOSIS — I48.91 ATRIAL FIBRILLATION: Primary | ICD-10-CM

## 2024-03-21 DIAGNOSIS — I49.9 ARRHYTHMIA: ICD-10-CM

## 2024-03-21 DIAGNOSIS — Z95.818 PRESENCE OF WATCHMAN LEFT ATRIAL APPENDAGE CLOSURE DEVICE: ICD-10-CM

## 2024-03-21 LAB
ABO + RH BLD: NORMAL
BLD GP AB SCN CELLS X3 SERPL QL: NORMAL
OHS QRS DURATION: 74 MS
OHS QRS DURATION: 76 MS
OHS QTC CALCULATION: 411 MS
OHS QTC CALCULATION: 416 MS
POC ACTIVATED CLOTTING TIME K: 147 SEC (ref 74–137)
POC ACTIVATED CLOTTING TIME K: 265 SEC (ref 74–137)
POC ACTIVATED CLOTTING TIME K: 271 SEC (ref 74–137)
SAMPLE: ABNORMAL
SPECIMEN OUTDATE: NORMAL

## 2024-03-21 PROCEDURE — 25500020 PHARM REV CODE 255: Performed by: STUDENT IN AN ORGANIZED HEALTH CARE EDUCATION/TRAINING PROGRAM

## 2024-03-21 PROCEDURE — D9220A PRA ANESTHESIA: Mod: ANES,,, | Performed by: ANESTHESIOLOGY

## 2024-03-21 PROCEDURE — C1894 INTRO/SHEATH, NON-LASER: HCPCS | Performed by: STUDENT IN AN ORGANIZED HEALTH CARE EDUCATION/TRAINING PROGRAM

## 2024-03-21 PROCEDURE — 25000003 PHARM REV CODE 250: Performed by: NURSE PRACTITIONER

## 2024-03-21 PROCEDURE — 33340 PERQ CLSR TCAT L ATR APNDGE: CPT | Performed by: STUDENT IN AN ORGANIZED HEALTH CARE EDUCATION/TRAINING PROGRAM

## 2024-03-21 PROCEDURE — 93010 ELECTROCARDIOGRAM REPORT: CPT | Mod: 76,,, | Performed by: INTERNAL MEDICINE

## 2024-03-21 PROCEDURE — 25000003 PHARM REV CODE 250: Performed by: STUDENT IN AN ORGANIZED HEALTH CARE EDUCATION/TRAINING PROGRAM

## 2024-03-21 PROCEDURE — 99223 1ST HOSP IP/OBS HIGH 75: CPT | Mod: ,,, | Performed by: STUDENT IN AN ORGANIZED HEALTH CARE EDUCATION/TRAINING PROGRAM

## 2024-03-21 PROCEDURE — C1769 GUIDE WIRE: HCPCS | Performed by: STUDENT IN AN ORGANIZED HEALTH CARE EDUCATION/TRAINING PROGRAM

## 2024-03-21 PROCEDURE — D9220A PRA ANESTHESIA: Mod: CRNA,,, | Performed by: NURSE ANESTHETIST, CERTIFIED REGISTERED

## 2024-03-21 PROCEDURE — 86920 COMPATIBILITY TEST SPIN: CPT | Performed by: NURSE PRACTITIONER

## 2024-03-21 PROCEDURE — 86850 RBC ANTIBODY SCREEN: CPT | Performed by: NURSE PRACTITIONER

## 2024-03-21 PROCEDURE — 63600175 PHARM REV CODE 636 W HCPCS: Performed by: NURSE PRACTITIONER

## 2024-03-21 PROCEDURE — 25000003 PHARM REV CODE 250: Performed by: NURSE ANESTHETIST, CERTIFIED REGISTERED

## 2024-03-21 PROCEDURE — 93005 ELECTROCARDIOGRAM TRACING: CPT

## 2024-03-21 PROCEDURE — 37000008 HC ANESTHESIA 1ST 15 MINUTES: Performed by: STUDENT IN AN ORGANIZED HEALTH CARE EDUCATION/TRAINING PROGRAM

## 2024-03-21 PROCEDURE — 37000009 HC ANESTHESIA EA ADD 15 MINS: Performed by: STUDENT IN AN ORGANIZED HEALTH CARE EDUCATION/TRAINING PROGRAM

## 2024-03-21 PROCEDURE — 63600175 PHARM REV CODE 636 W HCPCS: Performed by: NURSE ANESTHETIST, CERTIFIED REGISTERED

## 2024-03-21 PROCEDURE — 02L73DK OCCLUSION OF LEFT ATRIAL APPENDAGE WITH INTRALUMINAL DEVICE, PERCUTANEOUS APPROACH: ICD-10-PCS | Performed by: STUDENT IN AN ORGANIZED HEALTH CARE EDUCATION/TRAINING PROGRAM

## 2024-03-21 PROCEDURE — 11000001 HC ACUTE MED/SURG PRIVATE ROOM

## 2024-03-21 PROCEDURE — C1889 IMPLANT/INSERT DEVICE, NOC: HCPCS | Performed by: STUDENT IN AN ORGANIZED HEALTH CARE EDUCATION/TRAINING PROGRAM

## 2024-03-21 PROCEDURE — 33340 PERQ CLSR TCAT L ATR APNDGE: CPT | Mod: ,,, | Performed by: STUDENT IN AN ORGANIZED HEALTH CARE EDUCATION/TRAINING PROGRAM

## 2024-03-21 PROCEDURE — 63600175 PHARM REV CODE 636 W HCPCS: Performed by: STUDENT IN AN ORGANIZED HEALTH CARE EDUCATION/TRAINING PROGRAM

## 2024-03-21 PROCEDURE — 27201423 OPTIME MED/SURG SUP & DEVICES STERILE SUPPLY: Performed by: STUDENT IN AN ORGANIZED HEALTH CARE EDUCATION/TRAINING PROGRAM

## 2024-03-21 DEVICE — LEFT ATRIAL APPENDAGE CLOSURE DEVICE WITH DELIVERY SYSTEM
Type: IMPLANTABLE DEVICE | Site: HEART | Status: FUNCTIONAL
Brand: WATCHMAN FLX™

## 2024-03-21 RX ORDER — ONDANSETRON HYDROCHLORIDE 2 MG/ML
INJECTION, SOLUTION INTRAVENOUS
Status: DISCONTINUED | OUTPATIENT
Start: 2024-03-21 | End: 2024-03-21

## 2024-03-21 RX ORDER — HYDROCODONE BITARTRATE AND ACETAMINOPHEN 500; 5 MG/1; MG/1
TABLET ORAL
Status: ACTIVE | OUTPATIENT
Start: 2024-03-21

## 2024-03-21 RX ORDER — ONDANSETRON HYDROCHLORIDE 2 MG/ML
4 INJECTION, SOLUTION INTRAVENOUS DAILY PRN
Status: DISCONTINUED | OUTPATIENT
Start: 2024-03-21 | End: 2024-03-21 | Stop reason: HOSPADM

## 2024-03-21 RX ORDER — HEPARIN SOD,PORCINE/0.9 % NACL 1000/500ML
INTRAVENOUS SOLUTION INTRAVENOUS
Status: DISCONTINUED | OUTPATIENT
Start: 2024-03-21 | End: 2024-03-21 | Stop reason: HOSPADM

## 2024-03-21 RX ORDER — FENTANYL CITRATE 50 UG/ML
INJECTION, SOLUTION INTRAMUSCULAR; INTRAVENOUS
Status: DISCONTINUED | OUTPATIENT
Start: 2024-03-21 | End: 2024-03-21

## 2024-03-21 RX ORDER — ROCURONIUM BROMIDE 10 MG/ML
INJECTION, SOLUTION INTRAVENOUS
Status: DISCONTINUED | OUTPATIENT
Start: 2024-03-21 | End: 2024-03-21

## 2024-03-21 RX ORDER — PROTAMINE SULFATE 10 MG/ML
INJECTION, SOLUTION INTRAVENOUS
Status: DISCONTINUED | OUTPATIENT
Start: 2024-03-21 | End: 2024-03-21

## 2024-03-21 RX ORDER — LIDOCAINE HYDROCHLORIDE 20 MG/ML
INJECTION, SOLUTION INFILTRATION; PERINEURAL
Status: DISCONTINUED | OUTPATIENT
Start: 2024-03-21 | End: 2024-03-21 | Stop reason: HOSPADM

## 2024-03-21 RX ORDER — HEPARIN SOD,PORCINE/0.9 % NACL 250/250 ML
INTRAVENOUS SOLUTION INTRAVENOUS CONTINUOUS PRN
Status: DISCONTINUED | OUTPATIENT
Start: 2024-03-21 | End: 2024-03-21

## 2024-03-21 RX ORDER — OXYCODONE AND ACETAMINOPHEN 5; 325 MG/1; MG/1
1 TABLET ORAL
Status: DISCONTINUED | OUTPATIENT
Start: 2024-03-21 | End: 2024-03-21 | Stop reason: HOSPADM

## 2024-03-21 RX ORDER — CALCIUM CHLORIDE INJECTION 100 MG/ML
INJECTION, SOLUTION INTRAVENOUS
Status: DISCONTINUED | OUTPATIENT
Start: 2024-03-21 | End: 2024-03-21

## 2024-03-21 RX ORDER — IODIXANOL 320 MG/ML
INJECTION, SOLUTION INTRAVASCULAR
Status: DISCONTINUED | OUTPATIENT
Start: 2024-03-21 | End: 2024-03-21 | Stop reason: HOSPADM

## 2024-03-21 RX ORDER — MIDAZOLAM HYDROCHLORIDE 1 MG/ML
INJECTION, SOLUTION INTRAMUSCULAR; INTRAVENOUS
Status: DISCONTINUED | OUTPATIENT
Start: 2024-03-21 | End: 2024-03-21

## 2024-03-21 RX ORDER — SODIUM CHLORIDE 0.9 % (FLUSH) 0.9 %
3 SYRINGE (ML) INJECTION
Status: DISCONTINUED | OUTPATIENT
Start: 2024-03-21 | End: 2024-03-21 | Stop reason: HOSPADM

## 2024-03-21 RX ORDER — HEPARIN SODIUM 1000 [USP'U]/ML
INJECTION, SOLUTION INTRAVENOUS; SUBCUTANEOUS
Status: DISCONTINUED | OUTPATIENT
Start: 2024-03-21 | End: 2024-03-21

## 2024-03-21 RX ORDER — HYDROMORPHONE HYDROCHLORIDE 1 MG/ML
0.2 INJECTION, SOLUTION INTRAMUSCULAR; INTRAVENOUS; SUBCUTANEOUS EVERY 5 MIN PRN
Status: DISCONTINUED | OUTPATIENT
Start: 2024-03-21 | End: 2024-03-21 | Stop reason: HOSPADM

## 2024-03-21 RX ORDER — HALOPERIDOL 5 MG/ML
0.5 INJECTION INTRAMUSCULAR EVERY 10 MIN PRN
Status: DISCONTINUED | OUTPATIENT
Start: 2024-03-21 | End: 2024-03-21 | Stop reason: HOSPADM

## 2024-03-21 RX ORDER — LIDOCAINE HYDROCHLORIDE 20 MG/ML
INJECTION, SOLUTION EPIDURAL; INFILTRATION; INTRACAUDAL; PERINEURAL
Status: DISCONTINUED | OUTPATIENT
Start: 2024-03-21 | End: 2024-03-21

## 2024-03-21 RX ORDER — DEXAMETHASONE SODIUM PHOSPHATE 4 MG/ML
INJECTION, SOLUTION INTRA-ARTICULAR; INTRALESIONAL; INTRAMUSCULAR; INTRAVENOUS; SOFT TISSUE
Status: DISCONTINUED | OUTPATIENT
Start: 2024-03-21 | End: 2024-03-21

## 2024-03-21 RX ORDER — PROPOFOL 10 MG/ML
VIAL (ML) INTRAVENOUS
Status: DISCONTINUED | OUTPATIENT
Start: 2024-03-21 | End: 2024-03-21

## 2024-03-21 RX ADMIN — MIDAZOLAM 2 MG: 1 INJECTION INTRAMUSCULAR; INTRAVENOUS at 10:03

## 2024-03-21 RX ADMIN — PROPOFOL 150 MG: 10 INJECTION, EMULSION INTRAVENOUS at 10:03

## 2024-03-21 RX ADMIN — CEFAZOLIN 2 G: 2 INJECTION, POWDER, FOR SOLUTION INTRAMUSCULAR; INTRAVENOUS at 11:03

## 2024-03-21 RX ADMIN — SODIUM CHLORIDE 0.25 MCG/KG/MIN: 9 INJECTION, SOLUTION INTRAVENOUS at 10:03

## 2024-03-21 RX ADMIN — ONDANSETRON 4 MG: 2 INJECTION INTRAMUSCULAR; INTRAVENOUS at 12:03

## 2024-03-21 RX ADMIN — HEPARIN SODIUM 6400 UNITS: 1000 INJECTION, SOLUTION INTRAVENOUS; SUBCUTANEOUS at 11:03

## 2024-03-21 RX ADMIN — DEXAMETHASONE SODIUM PHOSPHATE 0.5 MG: 4 INJECTION, SOLUTION INTRAMUSCULAR; INTRAVENOUS at 12:03

## 2024-03-21 RX ADMIN — SUGAMMADEX 200 MG: 100 INJECTION, SOLUTION INTRAVENOUS at 12:03

## 2024-03-21 RX ADMIN — FENTANYL CITRATE 50 MCG: 50 INJECTION INTRAMUSCULAR; INTRAVENOUS at 12:03

## 2024-03-21 RX ADMIN — FENTANYL CITRATE 50 MCG: 50 INJECTION INTRAMUSCULAR; INTRAVENOUS at 10:03

## 2024-03-21 RX ADMIN — ROCURONIUM BROMIDE 50 MG: 10 INJECTION, SOLUTION INTRAVENOUS at 10:03

## 2024-03-21 RX ADMIN — SODIUM CHLORIDE: 0.9 INJECTION, SOLUTION INTRAVENOUS at 10:03

## 2024-03-21 RX ADMIN — DEXAMETHASONE SODIUM PHOSPHATE 4 MG: 4 INJECTION, SOLUTION INTRAMUSCULAR; INTRAVENOUS at 10:03

## 2024-03-21 RX ADMIN — CALCIUM CHLORIDE 1 G: 100 INJECTION, SOLUTION INTRAVENOUS at 12:03

## 2024-03-21 RX ADMIN — PROTAMINE SULFATE 65 MG: 10 INJECTION, SOLUTION INTRAVENOUS at 12:03

## 2024-03-21 RX ADMIN — HEPARIN SODIUM 12 UNITS/HR: 1000 INJECTION, SOLUTION INTRAVENOUS; SUBCUTANEOUS at 12:03

## 2024-03-21 RX ADMIN — LIDOCAINE HYDROCHLORIDE 50 MG: 20 INJECTION, SOLUTION EPIDURAL; INFILTRATION; INTRACAUDAL at 10:03

## 2024-03-21 NOTE — DISCHARGE INSTRUCTIONS
Watchman Discharge Instructions:     Medications:  Continue all home medications  Anticoagulation: resume Eliquis this evening    Groin site management, precautions, and restrictions:  Remove the bandages over your groin area the morning after your procedure. You can shower after you remove these bandages. Wash the sites at least once daily with soap and water. Keep the groin sites clean and dry. You do not need to apply ointments or bandages to the area.   Inspect the groin site daily and report to the physician any signs of infection at the site: redness, pain, fever >100.4, unusual pain at the access site or affected extremity, unusual swelling at the access site, or any yellow, white or green drainage.  Wear loose clothes and loose underwear.    If bleeding should occur at the groin sites following discharge:  If oozing from groin site occurs, lay down and apply pressure to the puncture site with your fingers without letting up for 15 minutes and lay flat for 1 hour. If bleeding has resolved, you can continue to monitor. If the bleeding continues or there is significant swelling or pain in the groin area, please call 911 immediately and visit the nearest ER for evaluation and treatment. Do not drive yourself to the hospital. DO NOT STOP TAKING YOUR BLOOD THINNER UNLESS INSTRUCTED BY A PHYSICIAN.     Activity Instructions:  Do not drive or operate any dangerous machinery for 24 hours, but optimally 48-72 hours since you were given general anesthesia.   Do not strain during bowel movements for the first 3 to 4 days after the procedure to prevent bleeding from the groin sites.  Avoid heavy lifting (more than 10 pounds) and pushing or pulling heavy objects for the first 5 to 7 days after the procedure.  Do not participate in strenuous activities for 5 days after the procedure. This includes most sports - jogging, golfing, play tennis, and bowling.  You may climb stairs if needed, but walk up and down the stairs more  slowly than usual.  Gradually increase your activities until you reach your normal activity level within one week after the procedure.  Do not take a bath or submerge your groin area (for example: Jacuzzi, swimming pool, or lake) or at least 1 week or when the puncture sites in your groin have completely healed.     Go to the Emergency Department if you develop:   Change in color or temperature of the leg  Redness, warmth, or drainage/pus at the groin sites  Chills or fever greater than 101.0 F   Severe bleeding or swelling at the groin sites  Acute Weakness or numbness   Visual, gait or speech disturbances   New chest pain, palpitations, shortness of breath, fainting     Follow up:  Post-procedure ROWDY in 45 days (KENDY Fernandez will reach out with the date and time)  Follow up with Dr. Cervantes in 3 months.

## 2024-03-21 NOTE — CONSULTS
[facility] - Short Stay Cardiac Unit  Cardiology  Consult Note    Patient Name: Maria Dolores Tamayo  MRN: [id number]  Admission Date: (Not on file)  Hospital Length of Stay: 0 days  Code Status: Prior   Attending Provider: PLACIDO Cervantes MD   Consulting Provider: Gerard Cuadra MD  Primary Care Physician: Rebecca Wright MD  Principal Problem:Atrial fibrillation with RVR    Patient information was obtained from patient, past medical records, and ER records.     Consults  Subjective:     Reason for consult:  left atrial appendage occluder implantation     HPI:   Patient is a 54 year old female with atrial fibrillation (CHADS2-VASC 3), NICM, CVA that presents for left atrial appendage occluder (Watchman) implantation.   TTE 04/23/2022: LVEF 55%    Dysphagia or odynophagia:  No  Liver Disease, esophageal disease, or known varices:  No  Upper GI Bleeding: No  Prior neck surgery or radiation:  No  History of anesthetic difficulties:  No  Family history of anesthetic difficulties:  No  Able to move neck in all directions:  Yes  Snoring:  No  Sleep Apnea:  No  Last oral intake:  12 hours ago  Mounjaro for weight loss      Past Medical History:   Diagnosis Date    Abnormal Pap smear of cervix     Anticoagulant long-term use     CHF (congestive heart failure)     Coronary artery disease     Encounter for blood transfusion     Heart murmur     atrial fibrillation    Myocardial infarction     Stroke        Past Surgical History:   Procedure Laterality Date    ABLATION OF ARRHYTHMOGENIC FOCUS FOR ATRIAL FIBRILLATION N/A 10/05/2022    Procedure: Ablation atrial fibrillation;  Surgeon: PLACIDO Cervantes MD;  Location: [facility];  Service: Cardiology;  Laterality: N/A;  AF, ROWDY, PVI, RFA, Carto, Gen, EH, 3 Prep    AUGMENTATION OF BREAST      COSMETIC SURGERY      heart abasian  01/13/2023    UTERINE ARTERY EMBOLIZATION  2000       Review of patient's allergies indicates:  No Known Allergies    No current  facility-administered medications on file prior to encounter.     Current Outpatient Medications on File Prior to Encounter   Medication Sig    apixaban (ELIQUIS) 2.5 mg Tab Take 1 tablet (2.5 mg total) by mouth 2 (two) times daily.    ASHWAGANDHA ROOT EXTRACT ORAL Take by mouth.    ergocalciferol, vitamin D2, (VITAMIN D ORAL) Take by mouth.    furosemide (LASIX) 20 MG tablet Take 1 tablet (20 mg total) by mouth daily as needed. edema    magnesium chloride (MAG 64 ORAL) Take by mouth.    MOUNJARO 10 mg/0.5 mL PnIj SMARTSIG:10 Milligram(s) SUB-Q Once a Week    UNABLE TO FIND Nutrascripts pregenolone sr 100mg    UNABLE TO FIND Nutrascripts DHEA Sr 5 mg     Family History       Problem Relation (Age of Onset)    Arthritis Father    Cancer Maternal Aunt    Diabetes Paternal Grandmother    Ovarian cancer Maternal Aunt (60)    Progressive Supranuclear Palsy Mother (73)          Tobacco Use    Smoking status: Never    Smokeless tobacco: Never   Substance and Sexual Activity    Alcohol use: Not Currently     Comment: rare    Drug use: No    Sexual activity: Yes     Partners: Male     Birth control/protection: None     Review of Systems   Constitutional: Negative for chills and fever.   Cardiovascular:  Negative for chest pain, orthopnea and palpitations.   Respiratory:  Negative for cough and shortness of breath.    Gastrointestinal:  Negative for abdominal pain.   Neurological:  Negative for dizziness, headaches and light-headedness.   Psychiatric/Behavioral:  Negative for altered mental status.      Objective:     Vital Signs (Most Recent):    Vital Signs (24h Range):  BP: ()/()   Arterial Line BP: ()/()         There is no height or weight on file to calculate BMI.            No intake or output data in the 24 hours ending 03/20/24 2050    Lines/Drains/Airways       None                    Physical Exam  Vitals and nursing note reviewed.   Constitutional:       General: She is not in acute distress.     Appearance:  "Normal appearance. She is not toxic-appearing.   HENT:      Head: Normocephalic and atraumatic.      Mouth/Throat:      Mouth: Mucous membranes are moist.   Cardiovascular:      Rate and Rhythm: Normal rate and regular rhythm.      Heart sounds: No murmur heard.     No friction rub. No gallop.   Pulmonary:      Effort: Pulmonary effort is normal. No respiratory distress.      Breath sounds: Normal breath sounds.   Abdominal:      Palpations: Abdomen is soft.   Musculoskeletal:      Right lower leg: No edema.      Left lower leg: No edema.   Skin:     General: Skin is warm.   Neurological:      Mental Status: She is alert and oriented to person, place, and time. Mental status is at baseline.          Significant Labs: BMP: No results for input(s): "GLU", "NA", "K", "CL", "CO2", "BUN", "CREATININE", "CALCIUM", "MG" in the last 48 hours., CMP No results for input(s): "NA", "K", "CL", "CO2", "GLU", "BUN", "CREATININE", "CALCIUM", "PROT", "ALBUMIN", "BILITOT", "ALKPHOS", "AST", "ALT", "ANIONGAP", "ESTGFRAFRICA", "EGFRNONAA" in the last 48 hours., CBC No results for input(s): "WBC", "HGB", "HCT", "PLT" in the last 48 hours., and All pertinent lab results from the last 24 hours have been reviewed.    Significant Imaging: Echocardiogram: Transthoracic echo (TTE) complete (Cupid Only):   Results for orders placed or performed during the hospital encounter of 04/22/22   Echo   Result Value Ref Range    Ascending aorta 2.69 cm    STJ 2.44 cm    AV mean gradient 5 mmHg    Ao peak joseluis 1.51 m/s    Ao VTI 27.71 cm    IVRT 114.18 msec    IVS 0.89 0.6 - 1.1 cm    LA size 3.50 cm    Left Atrium Major Axis 5.70 cm    Left Atrium Minor Axis 5.66 cm    LVIDd 5.33 3.5 - 6.0 cm    LVIDs 3.51 2.1 - 4.0 cm    LVOT diameter 1.92 cm    LVOT peak VTI 18.94 cm    Posterior Wall 1.03 0.6 - 1.1 cm    MV Peak A Joseluis 0.67 m/s    E wave deceleration time 189.88 msec    MV Peak E Joesluis 0.71 m/s    RA Major Axis 4.66 cm    RA Width 3.32 cm    RVDD 3.01 " cm    Sinus 2.91 cm    TAPSE 2.62 cm    TDI LATERAL 0.13 m/s    TDI SEPTAL 0.08 m/s    LA WIDTH 4.23 cm    MV stenosis pressure 1/2 time 55.07 ms    LV Diastolic Volume 136.90 mL    LV Systolic Volume 51.34 mL    RV S' 18.10 cm/s    LVOT peak otis 1.05 m/s    LA volume (mod) 48.38 cm3    LV LATERAL E/E' RATIO 5.46 m/s    LV SEPTAL E/E' RATIO 8.88 m/s    FS 34 %    LA volume 71.48 cm3    LV mass 191.68 g    Left Ventricle Relative Wall Thickness 0.39 cm    AV valve area 1.98 cm2    AV Velocity Ratio 0.70     AV index (prosthetic) 0.68     MV valve area p 1/2 method 3.99 cm2    E/A ratio 1.06     Mean e' 0.11 m/s    LVOT area 2.9 cm2    LVOT stroke volume 54.81 cm3    AV peak gradient 9 mmHg    E/E' ratio 6.76 m/s    LV Systolic Volume Index 29.0 mL/m2    LV Diastolic Volume Index 77.34 mL/m2    LA Volume Index 40.4 mL/m2    LV Mass Index 108 g/m2    LA Volume Index (Mod) 27.3 mL/m2    BSA 1.81 m2    Right Atrial Pressure (from IVC) 3 mmHg    EF 55 %    Narrative    · The left ventricle is normal in size with eccentric hypertrophy and   normal systolic function. The estimated ejection fraction is 55%.  · Normal right ventricular size with normal right ventricular systolic   function.  · Mild left atrial enlargement.  · Normal central venous pressure (3 mmHg).  · Atrial fibrillation present throughout the examination.        Assessment and Plan:     Atrial fibrillation with RVR  Patient is a 54 year old female with atrial fibrillation (CHADS2-VASC 3), NICM, CVA that presents for left atrial appendage occluder (Watchman) implantation.   Mounjaro for weight loss  TTE 04/23/2022: LVEF 55%    IntraOp ROWDY for evaluation of left atrial appendage occluder (Watchman) implantation  -No absolute contraindications of esophageal stricture, tumor, perforation, laceration,or diverticulum and/or active GI bleed  -The risks, benefits & alternatives of the procedure were explained to the patient.   -The risks of transesophageal echo  include but are not limited to:  Dental trauma, esophageal trauma/perforation, bleeding, laryngospasm/brochospasm, aspiration, sore throat/hoarseness, & dislodgement of the endotracheal tube/nasogastric tube (where applicable).    -The risks of moderate sedation include hypotension, respiratory depression, arrhythmias, bronchospasm, & death.    -Informed consent was obtained. The patient is agreeable to proceed with the procedure and all questions and concerns addressed.    Staff attestation to follow. Further recommendations per attending addendum        VTE Risk Mitigation (From admission, onward)      None            Thank you for your consult. We will sign off post ROWDY. Please contact us if you have any additional questions.    Gerard Cuadra MD  Cardiology PGY6  [facility] - Short Stay Cardiac [facility]

## 2024-03-21 NOTE — HPI
Ms. 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     History obtained through patient report and clinic notes:    She was previously admitted from 4/22-25/2022 from her rehabilitation center with several alerts from her LifeVest, consistent with multiple events of atrial fibrillation with RVR.      10/5/2022 successful RFA pulmonary vein isolation     1/6/2024 During her last office visit with Dr. Cervantes, feeling overall quite well and has recovered fully following her prior ablation. She feels that she has returned to her previous baseline level of health with the exception of mild word-searching difficulty following her prior stroke. She denies any recurrent symptoms of palpitations following her ablation. She was previously maintained on amiodarone 200mg po daily, but following her successful PVI, we obtained an ambulatory event monitor after the blanking period revealing no recurrent atrial fibrillation, and this antiarrhythmic agent was successfully discontinued. She has a [employer] mobile andrea and denies any recurrent episodes of atrial fibrillation, and denies any symptoms of racing heart rates or recurrent palpitations. While previously on amiodarone therapy, she reported symptoms of vivid dreams and fatigue on this medication. Since discontinuation these side effects have entirely resolved. She has had recovery of her systolic function, with most recent LVEF 55%. She has been vigilant to wear her Apple watch, and denies any further episodes of atrial fibrillation on her Apple watch. She is very vigilant to continue her apixaban 2.5mg po BID.  Her dose was decreased from 5mg po BID following significant uterine bleeding, and she denies any subsequent events of bleeding since reducing her dose of apixaban. She remains very concerned about increased uterine bleeding in the future as she is going through menopause presently. She remains in sinus rhythm on all subsequent ECGs, with no evidence of prolonged QT, nonsustained or sustained VT. 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 I called her outside hospital in an effort to obtain strips of her prior episode of WCT. Unfortunately, this event occurred in the CT scanner and no strips or ECGs were recorded. Currently, she has no indication for ICD implantation. We previously discussed obtaining a cardiac MRI in order to further assess for possible VT substrate in the future; however, as she has not had any subsequent events of arrhythmia following her successful ablation, we will continue to monitor at this time.      She denies any episodes of dizziness, lightheadedness, syncope/presyncope, chest pain or chest discomfort, palpitations, nausea or vomiting, orthopnea, lower extremity edema, or PND. She reports baseline mild shortness of breath and dyspnea with exertion that she feels has remained stable for her. She reports that she remains on Mounjaro and has been making excellent progress with weight loss. She can climb more than one flight of stairs prior to needing to take a break and remains very physically active without reported limitation.     Following her successful pulmonary vein isolation procedure she denies any subsequent episodes of atrial fibrillation on both symptom review and on her Apple watch recordings. We reviewed the results of her subsequent 48-hour Holter monitor that did not reveal any events of recurrent atrial fibrillation. She has been successfully discontinued from amiodarone 200mg po daily with maintenance of sinus  "rhythm.   She has been discontinued from her lisinopril therapy as her blood pressures remain at goal off of any antihypertensive agent.    Her [id number] is 3 (prior CVA, female gender, age less than 64), portending an annual adjusted risk of CVA of 3.2%. She remains on uninterrupted apixaban 2.5mg po BID. This dose was reduced from her previous 5mg po BID secondary to significant uterine bleeding. She is very interested in undergoing a WATCHMAN left atrial appendage occlusion procedure in an effort to discontinue indefinite anticoagulation given her adverse bleeding. Her HAS-BLED score is elevated at 4, portending an annual adjusted risk of adverse bleeding of 8.7%, with her risk of recurrent adverse bleeding outweighing her risk of CVA. We discussed the procedure of undergoing a WATCHMAN in detail, in addition to review of the potential risks, benefits, and alternative treatment options. Ms. Tamayo voices understanding and is in agreement with proceeding. Informed consent was obtained. She will remain on dose-reduced apixaban before and following her WATCHMAN until an adequate seal can be confirmed on ROWDY.  She has recovery of her systolic function with no recorded VT. My suspicion was that her prior episode of reported "wide complex tachycardia" was actually aberrant atrial fibrillation with RVR at OSH rather than true VT - this event occurred in a CT scanner with no tracings available to confirm. We discussed obtaining a cardiac MRI following her ablation for complete assessment of LGE for risk stratification for potential VT; however, she has not had any subsequent palpitations, and she would prefer to defer this imaging at this time.   Possible underlying drivers of atrial fibrillation were addressed at this appointment, including recommendations for maintenance of a healthy BMI - now a class I recommendation. Review of laboratory data revealed acceptable TSH at 1.863. She denies any loud snoring or " excessive daytime sleepiness.    This patient will present to the EP laboratory to undergo implantation of a WATCHMAN left atrial appendage occlusion device. She will remain on apixaban oral anticoagulation. All questions and concerns were addressed at this encounter.     Ms. Tamayo presents today to [facility] for scheduled implantation of a WATCHMAN left atrial appendage occlusion device with Dr. Cervantes. *** denies any chest pain, palpitations, SOB, ALCARAZ, dizziness, light headedness, weakness, syncope, or near syncopal episodes. *** denies any bleeding, infections, fevers, rashes, or surgeries in the past 30 days. She currently is taking eliquis 2.5 mg BID (last dose taken***), lasix 20 mg daily PRN, and mounjaro (held since ***).    ECG today shows sinus bradycardia at 55 bpm  ms QRS 74 ms QT/Qtc 430/411 ms.

## 2024-03-21 NOTE — ANESTHESIA PROCEDURE NOTES
Intubation    Date/Time: 3/21/2024 10:44 AM    Performed by: Val Nash CRNA  Authorized by: Catarino Das Jr., MD    Intubation:     Induction:  Intravenous    Mask Ventilation:  Easy mask    Attempts:  1    Attempted By:  CRNA    Method of Intubation:  Video laryngoscopy    Blade:  Palma 3    Laryngeal View Grade: Grade I - full view of cords      Difficult Airway Encountered?: No      Complications:  None    Airway Device:  Oral endotracheal tube    Airway Device Size:  7.0    Style/Cuff Inflation:  Cuffed    Inflation Amount (mL):  5    Tube secured:  21    Secured at:  The teeth    Placement Verified By:  Capnometry    Complicating Factors:  None    Findings Post-Intubation:  BS equal bilateral

## 2024-03-21 NOTE — HPI
Patient is a 54 year old female with atrial fibrillation (CHADS2-VASC 3), NICM, CVA that presents for left atrial appendage occluder (Watchman) implantation.   TTE 04/23/2022: LVEF 55%    Dysphagia or odynophagia:  No  Liver Disease, esophageal disease, or known varices:  No  Upper GI Bleeding: No  Prior neck surgery or radiation:  No  History of anesthetic difficulties:  No  Family history of anesthetic difficulties:  No  Able to move neck in all directions:  Yes  Snoring:  No  Sleep Apnea:  No  Last oral intake:  12 hours ago  Mounjaro for weight loss

## 2024-03-21 NOTE — NURSING TRANSFER
Nursing Transfer Note      Reason patient is being transferred: post procedure    Transfer To: SSCU 1    Transfer via stretcher    Transfer with cardiac monitoring via centralized telemetry    Transported by RN    Medicines sent: none    Any special needs or follow-up needed: R groin sutures to be removed at 1640, bed rest complete at 1840    Chart send with patient: Yes    Notified: family via waiting room liaison Babak    Patient reassessed at: 3/21/2024 4:00 PM

## 2024-03-21 NOTE — ASSESSMENT & PLAN NOTE
Patient is a 54 year old female with atrial fibrillation (CHADS2-VASC 3), NICM, CVA that presents for left atrial appendage occluder (Watchman) implantation.   Mounjaro for weight loss  TTE 04/23/2022: LVEF 55%    IntraOp ROWDY for evaluation of left atrial appendage occluder (Watchman) implantation  -No absolute contraindications of esophageal stricture, tumor, perforation, laceration,or diverticulum and/or active GI bleed  -The risks, benefits & alternatives of the procedure were explained to the patient.   -The risks of transesophageal echo include but are not limited to:  Dental trauma, esophageal trauma/perforation, bleeding, laryngospasm/brochospasm, aspiration, sore throat/hoarseness, & dislodgement of the endotracheal tube/nasogastric tube (where applicable).    -The risks of moderate sedation include hypotension, respiratory depression, arrhythmias, bronchospasm, & death.    -Informed consent was obtained. The patient is agreeable to proceed with the procedure and all questions and concerns addressed.    Staff attestation to follow. Further recommendations per attending addendum

## 2024-03-21 NOTE — SUBJECTIVE & OBJECTIVE
Past Medical History:   Diagnosis Date    Abnormal Pap smear of cervix     Anticoagulant long-term use     CHF (congestive heart failure)     Coronary artery disease     Encounter for blood transfusion     Heart murmur     atrial fibrillation    Myocardial infarction     Stroke        Past Surgical History:   Procedure Laterality Date    ABLATION OF ARRHYTHMOGENIC FOCUS FOR ATRIAL FIBRILLATION N/A 10/05/2022    Procedure: Ablation atrial fibrillation;  Surgeon: PLACIDO Cervantes MD;  Location: [facility] EP LAB;  Service: Cardiology;  Laterality: N/A;  AF, ROWDY, PVI, RFA, Carto, Gen, EH, 3 Prep    AUGMENTATION OF BREAST      COSMETIC SURGERY      heart abasian  01/13/2023    UTERINE ARTERY EMBOLIZATION  2000       Review of patient's allergies indicates:  No Known Allergies    No current facility-administered medications on file prior to encounter.     Current Outpatient Medications on File Prior to Encounter   Medication Sig    apixaban (ELIQUIS) 2.5 mg Tab Take 1 tablet (2.5 mg total) by mouth 2 (two) times daily.    ASHWAGANDHA ROOT EXTRACT ORAL Take by mouth.    ergocalciferol, vitamin D2, (VITAMIN D ORAL) Take by mouth.    furosemide (LASIX) 20 MG tablet Take 1 tablet (20 mg total) by mouth daily as needed. edema    magnesium chloride (MAG 64 ORAL) Take by mouth.    MOUNJARO 10 mg/0.5 mL PnIj SMARTSIG:10 Milligram(s) SUB-Q Once a Week    UNABLE TO FIND Nutrascripts pregenolone sr 100mg    UNABLE TO FIND Nutrascripts DHEA Sr 5 mg     Family History       Problem Relation (Age of Onset)    Arthritis Father    Cancer Maternal Aunt    Diabetes Paternal Grandmother    Ovarian cancer Maternal Aunt (60)    Progressive Supranuclear Palsy Mother (73)          Tobacco Use    Smoking status: Never    Smokeless tobacco: Never   Substance and Sexual Activity    Alcohol use: Not Currently     Comment: rare    Drug use: No    Sexual activity: Yes     Partners: Male     Birth control/protection: None     Review of Systems  "  Constitutional: Negative for chills and fever.   Cardiovascular:  Negative for chest pain, orthopnea and palpitations.   Respiratory:  Negative for cough and shortness of breath.    Gastrointestinal:  Negative for abdominal pain.   Neurological:  Negative for dizziness, headaches and light-headedness.   Psychiatric/Behavioral:  Negative for altered mental status.      Objective:     Vital Signs (Most Recent):    Vital Signs (24h Range):  BP: ()/()   Arterial Line BP: ()/()         There is no height or weight on file to calculate BMI.            No intake or output data in the 24 hours ending 03/20/24 2050    Lines/Drains/Airways       None                    Physical Exam  Vitals and nursing note reviewed.   Constitutional:       General: She is not in acute distress.     Appearance: Normal appearance. She is not toxic-appearing.   HENT:      Head: Normocephalic and atraumatic.      Mouth/Throat:      Mouth: Mucous membranes are moist.   Cardiovascular:      Rate and Rhythm: Normal rate and regular rhythm.      Heart sounds: No murmur heard.     No friction rub. No gallop.   Pulmonary:      Effort: Pulmonary effort is normal. No respiratory distress.      Breath sounds: Normal breath sounds.   Abdominal:      Palpations: Abdomen is soft.   Musculoskeletal:      Right lower leg: No edema.      Left lower leg: No edema.   Skin:     General: Skin is warm.   Neurological:      Mental Status: She is alert and oriented to person, place, and time. Mental status is at baseline.          Significant Labs: BMP: No results for input(s): "GLU", "NA", "K", "CL", "CO2", "BUN", "CREATININE", "CALCIUM", "MG" in the last 48 hours., CMP No results for input(s): "NA", "K", "CL", "CO2", "GLU", "BUN", "CREATININE", "CALCIUM", "PROT", "ALBUMIN", "BILITOT", "ALKPHOS", "AST", "ALT", "ANIONGAP", "ESTGFRAFRICA", "EGFRNONAA" in the last 48 hours., CBC No results for input(s): "WBC", "HGB", "HCT", "PLT" in the last 48 hours., and All " pertinent lab results from the last 24 hours have been reviewed.    Significant Imaging: Echocardiogram: Transthoracic echo (TTE) complete (Cupid Only):   Results for orders placed or performed during the hospital encounter of 04/22/22   Echo   Result Value Ref Range    Ascending aorta 2.69 cm    STJ 2.44 cm    AV mean gradient 5 mmHg    Ao peak joseluis 1.51 m/s    Ao VTI 27.71 cm    IVRT 114.18 msec    IVS 0.89 0.6 - 1.1 cm    LA size 3.50 cm    Left Atrium Major Axis 5.70 cm    Left Atrium Minor Axis 5.66 cm    LVIDd 5.33 3.5 - 6.0 cm    LVIDs 3.51 2.1 - 4.0 cm    LVOT diameter 1.92 cm    LVOT peak VTI 18.94 cm    Posterior Wall 1.03 0.6 - 1.1 cm    MV Peak A Joseluis 0.67 m/s    E wave deceleration time 189.88 msec    MV Peak E Joseluis 0.71 m/s    RA Major Axis 4.66 cm    RA Width 3.32 cm    RVDD 3.01 cm    Sinus 2.91 cm    TAPSE 2.62 cm    TDI LATERAL 0.13 m/s    TDI SEPTAL 0.08 m/s    LA WIDTH 4.23 cm    MV stenosis pressure 1/2 time 55.07 ms    LV Diastolic Volume 136.90 mL    LV Systolic Volume 51.34 mL    RV S' 18.10 cm/s    LVOT peak joseluis 1.05 m/s    LA volume (mod) 48.38 cm3    LV LATERAL E/E' RATIO 5.46 m/s    LV SEPTAL E/E' RATIO 8.88 m/s    FS 34 %    LA volume 71.48 cm3    LV mass 191.68 g    Left Ventricle Relative Wall Thickness 0.39 cm    AV valve area 1.98 cm2    AV Velocity Ratio 0.70     AV index (prosthetic) 0.68     MV valve area p 1/2 method 3.99 cm2    E/A ratio 1.06     Mean e' 0.11 m/s    LVOT area 2.9 cm2    LVOT stroke volume 54.81 cm3    AV peak gradient 9 mmHg    E/E' ratio 6.76 m/s    LV Systolic Volume Index 29.0 mL/m2    LV Diastolic Volume Index 77.34 mL/m2    LA Volume Index 40.4 mL/m2    LV Mass Index 108 g/m2    LA Volume Index (Mod) 27.3 mL/m2    BSA 1.81 m2    Right Atrial Pressure (from IVC) 3 mmHg    EF 55 %    Narrative    · The left ventricle is normal in size with eccentric hypertrophy and   normal systolic function. The estimated ejection fraction is 55%.  · Normal right ventricular  size with normal right ventricular systolic   function.  · Mild left atrial enlargement.  · Normal central venous pressure (3 mmHg).  · Atrial fibrillation present throughout the examination.

## 2024-03-21 NOTE — ANESTHESIA PREPROCEDURE EVALUATION
03/21/2024  Maria Dolores Tamayo is a 54 y.o., female.    Last monjourno 2/29 no associated N/V    Procedures:       Left atrial appendage occlusion (Chest) - AF, ROWDY/Watchman, Aranza, Gen, EH, 3prep       Transesophageal echo (ROWDY) intra-procedure log documentation - AF, ROWDY/Watchman, JamilSci, Gen, EH, 3prep Anesthesia type: General Diagnosis: Paroxysmal atrial fibrillation [I48.0]     Pre-operative evaluation for Procedure(s) (LRB):  Left atrial appendage occlusion (N/A)  Transesophageal echo (ROWDY) intra-procedure log documentation (N/A)      Encounter Diagnoses   Name Primary?    Paroxysmal atrial fibrillation     Atrial fibrillation     Arrhythmia        Review of patient's allergies indicates:  No Known Allergies    Medications Prior to Admission   Medication Sig Dispense Refill Last Dose    ASHWAGANDHA ROOT EXTRACT ORAL Take by mouth.   3/20/2024    ergocalciferol, vitamin D2, (VITAMIN D ORAL) Take by mouth.   3/20/2024 at 2100    magnesium chloride (MAG 64 ORAL) Take by mouth.   3/20/2024 at 2100    progesterone (PROMETRIUM) 200 MG capsule Take 200 mg by mouth every evening. at bedtime   3/20/2024 at 2100    UNABLE TO FIND Nutrascripts pregenolone sr 100mg   3/20/2024 at 0800    UNABLE TO FIND Nutrascripts DHEA Sr 5 mg   3/20/2024 at 0800    apixaban (ELIQUIS) 2.5 mg Tab Take 1 tablet (2.5 mg total) by mouth 2 (two) times daily. 180 tablet 1 3/19/2024 at 2100    furosemide (LASIX) 20 MG tablet Take 1 tablet (20 mg total) by mouth daily as needed. edema 30 tablet 0 More than a month    MOUNJARO 10 mg/0.5 mL PnIj SMARTSIG:10 Milligram(s) SUB-Q Once a Week   2/29/2024            No current facility-administered medications on file prior to encounter.     Current Outpatient Medications on File Prior to Encounter   Medication Sig Dispense Refill    ASHWAGANDHA ROOT EXTRACT ORAL Take by mouth.       Problem: Patient Care Overview  Goal: Plan of Care Review  Outcome: Ongoing (interventions implemented as appropriate)   [id number]   OTHER   Outcome Summary room air trial at 1300, no events, tachypneic at times, adlib feeds, taking ~50-60 ml, no emesis, stooling, h/s schedule for tmrw, pic consent signed, giving hep b at 1800 care     Goal: Individualization and Mutuality  Outcome: Ongoing (interventions implemented as appropriate)      Problem:  ([place],NICU)  Goal: Signs and Symptoms of Listed Potential Problems Will be Absent, Minimized or Managed ()  Outcome: Ongoing (interventions implemented as appropriate)         "ergocalciferol, vitamin D2, (VITAMIN D ORAL) Take by mouth.      magnesium chloride (MAG 64 ORAL) Take by mouth.      UNABLE TO FIND Nutrascripts pregenolone sr 100mg      UNABLE TO FIND Nutrascripts DHEA Sr 5 mg      apixaban (ELIQUIS) 2.5 mg Tab Take 1 tablet (2.5 mg total) by mouth 2 (two) times daily. 180 tablet 1    furosemide (LASIX) 20 MG tablet Take 1 tablet (20 mg total) by mouth daily as needed. edema 30 tablet 0    MOUNJARO 10 mg/0.5 mL PnIj SMARTSIG:10 Milligram(s) SUB-Q Once a Week         Past Medical History:  No date: Abnormal Pap smear of cervix  No date: Anticoagulant long-term use  No date: CHF (congestive heart failure)  No date: Coronary artery disease  No date: Encounter for blood transfusion  No date: Heart murmur      Comment:  atrial fibrillation  No date: Myocardial infarction  No date: Stroke    Past Surgical History:   Procedure Laterality Date    ABLATION OF ARRHYTHMOGENIC FOCUS FOR ATRIAL FIBRILLATION N/A 10/05/2022    Procedure: Ablation atrial fibrillation;  Surgeon: PLACIDO Cervantes MD;  Location: [facility] EP LAB;  Service: Cardiology;  Laterality: N/A;  AF, ROWDY, PVI, RFA, Carto, Gen, EH, 3 Prep    AUGMENTATION OF BREAST      COSMETIC SURGERY      heart abasian  01/13/2023    UTERINE ARTERY EMBOLIZATION  2000       Social History     Tobacco Use   Smoking Status Never   Smokeless Tobacco Never       Social History     Substance and Sexual Activity   Alcohol Use Not Currently    Comment: rare       Physical Activity: Not on file         No results for input(s): "HCT" in the last 72 hours.  No results for input(s): "PLT" in the last 72 hours.  No results for input(s): "K" in the last 72 hours.  No results for input(s): "CREATININE" in the last 72 hours.  No results for input(s): "GLU" in the last 72 hours.  No results for input(s): "PT" in the last 72 hours.  Vitals:    03/21/24 0816 03/21/24 0828   BP: (!) 114/55 103/74   BP Location: Right arm    Patient Position: Lying    Pulse: (!) " "52    Resp: 18    Temp: 36.4 °C (97.5 °F)    TempSrc: Temporal    SpO2: 98%    Weight: 63.5 kg (140 lb)    Height: 5' 4" (1.626 m)                        Pre-op Assessment          Review of Systems  Anesthesia Hx:  No problems with previous Anesthesia                Hematology/Oncology:  Hematology Normal   Oncology Normal                Hematology Comments: Last eliquis Tuesday                    Cardiovascular:      Denies Hypertension.   Denies MI. CAD  asymptomatic     Denies Angina. CHF        Walks 2 miles on occasion without difficulty   Coronary Artery Disease:          Hx of Myocardial Infarction     Congestive Heart Failure (CHF)                  Atrial Fibrillation     Pulmonary:  Pulmonary Normal   Denies COPD.  Denies Asthma.   Denies Shortness of breath.                  Renal/:   Denies Chronic Renal Disease.                Hepatic/GI:      Denies Liver Disease.            Neurological:   CVA (4/2022, mild word finding difficulty), residual symptoms    Denies Seizures.                   CVA - Cerebrovasular Accident                 Endocrine:  Denies Diabetes.               Physical Exam  General: Well nourished, Cooperative, Alert and Oriented    Airway:  Mallampati: II   Mouth Opening: Normal  TM Distance: Normal  Tongue: Normal  Neck ROM: Normal ROM        Anesthesia Plan  Type of Anesthesia, risks & benefits discussed:    Anesthesia Type: Gen ETT  Intra-op Monitoring Plan: Standard ASA Monitors and Art Line  Post Op Pain Control Plan: multimodal analgesia and IV/PO Opioids PRN  Induction:  IV  Informed Consent: Informed consent signed with the Patient and all parties understand the risks and agree with anesthesia plan.  All questions answered.   ASA Score: 3  Day of Surgery Review of History & Physical: H&P Update referred to the surgeon/provider.    Ready For Surgery From Anesthesia Perspective.     .  Date/Time: 10/5/2022 8:29 AM  Performed by: Ellyn Cantu CRNA  Authorized by: ESTER" Chris Vazquez MD      Intubation:     Induction:  Intravenous    Intubated:  Postinduction    Mask Ventilation:  Easy mask    Attempts:  1    Attempted By:  CRNA    Method of Intubation:  Video laryngoscopy    Blade:  Palma 3    Laryngeal View Grade: Grade I - full view of cords      Difficult Airway Encountered?: No      Complications:  None    Airway Device:  Oral endotracheal tube    Airway Device Size:  7.0    Style/Cuff Inflation:  Cuffed (inflated to minimal occlusive pressure)    Tube secured:  22    Secured at:  The lips    Placement Verified By:  Capnometry    Complicating Factors:  None    Findings Post-Intubation:  BS equal bilateral and atraumatic/condition of teeth unchanged        The left ventricle is normal in size with eccentric hypertrophy and normal systolic function. The estimated ejection fraction is 55%.  Normal right ventricular size with normal right ventricular systolic function.  Mild left atrial enlargement.  Normal central venous pressure (3 mmHg).  Atrial fibrillation present throughout the examination.

## 2024-03-21 NOTE — SUBJECTIVE & OBJECTIVE
Past Medical History:   Diagnosis Date    Abnormal Pap smear of cervix     Anticoagulant long-term use     CHF (congestive heart failure)     Coronary artery disease     Encounter for blood transfusion     Heart murmur     atrial fibrillation    Myocardial infarction     Stroke        Past Surgical History:   Procedure Laterality Date    ABLATION OF ARRHYTHMOGENIC FOCUS FOR ATRIAL FIBRILLATION N/A 10/05/2022    Procedure: Ablation atrial fibrillation;  Surgeon: PLACIDO Cervantes MD;  Location: [facility] EP LAB;  Service: Cardiology;  Laterality: N/A;  AF, ROWDY, PVI, RFA, Carto, Gen, EH, 3 Prep    AUGMENTATION OF BREAST      COSMETIC SURGERY      heart abasian  01/13/2023    UTERINE ARTERY EMBOLIZATION  2000       Review of patient's allergies indicates:  No Known Allergies    No current facility-administered medications on file prior to encounter.     Current Outpatient Medications on File Prior to Encounter   Medication Sig    ASHWAGANDHA ROOT EXTRACT ORAL Take by mouth.    ergocalciferol, vitamin D2, (VITAMIN D ORAL) Take by mouth.    magnesium chloride (MAG 64 ORAL) Take by mouth.    UNABLE TO FIND Nutrascripts pregenolone sr 100mg    UNABLE TO FIND Nutrascripts DHEA Sr 5 mg    apixaban (ELIQUIS) 2.5 mg Tab Take 1 tablet (2.5 mg total) by mouth 2 (two) times daily.    furosemide (LASIX) 20 MG tablet Take 1 tablet (20 mg total) by mouth daily as needed. edema    MOUNJARO 10 mg/0.5 mL PnIj SMARTSIG:10 Milligram(s) SUB-Q Once a Week     Family History       Problem Relation (Age of Onset)    Arthritis Father    Cancer Maternal Aunt    Diabetes Paternal Grandmother    Ovarian cancer Maternal Aunt (60)    Progressive Supranuclear Palsy Mother (73)          Tobacco Use    Smoking status: Never    Smokeless tobacco: Never   Substance and Sexual Activity    Alcohol use: Not Currently     Comment: rare    Drug use: No    Sexual activity: Yes     Partners: Male     Birth control/protection: None     Review of Systems    Constitutional: Negative for chills, fever and malaise/fatigue.   HENT:  Negative for congestion and nosebleeds.    Eyes:  Negative for blurred vision.   Cardiovascular:  Negative for chest pain, dyspnea on exertion, irregular heartbeat, leg swelling, near-syncope, orthopnea, palpitations, paroxysmal nocturnal dyspnea and syncope.   Respiratory:  Negative for cough, hemoptysis, shortness of breath, sleep disturbances due to breathing, sputum production and wheezing.    Endocrine: Negative for polyphagia.   Hematologic/Lymphatic: Negative for bleeding problem. Does not bruise/bleed easily.   Skin:  Negative for itching and rash.   Musculoskeletal:  Negative for back pain, joint swelling, muscle cramps and muscle weakness.   Gastrointestinal:  Negative for bloating, abdominal pain, hematemesis, hematochezia, nausea and vomiting.   Genitourinary:  Positive for non-menstrual bleeding. Negative for dysuria and hematuria.   Neurological:  Negative for dizziness, focal weakness, headaches, light-headedness, loss of balance, numbness and weakness.   Psychiatric/Behavioral:  Negative for altered mental status.      Objective:     Vital Signs (Most Recent):    Vital Signs (24h Range):             There is no height or weight on file to calculate BMI.             Physical Exam  Vitals and nursing note reviewed.   Constitutional:       General: She is not in acute distress.     Appearance: Normal appearance. She is well-developed. She is not diaphoretic.   HENT:      Head: Normocephalic and atraumatic.      Mouth/Throat:      Mouth: Mucous membranes are moist.      Pharynx: No oropharyngeal exudate.   Eyes:      Conjunctiva/sclera: Conjunctivae normal.      Pupils: Pupils are equal, round, and reactive to light.   Cardiovascular:      Rate and Rhythm: Regular rhythm. Bradycardia present. No extrasystoles are present.     Pulses: Normal pulses and intact distal pulses.           Radial pulses are 2+ on the right side and 2+ on  the left side.        Dorsalis pedis pulses are 2+ on the right side and 2+ on the left side.      Heart sounds: S1 normal and S2 normal. Murmur heard.   Pulmonary:      Effort: Pulmonary effort is normal. No accessory muscle usage or respiratory distress.      Breath sounds: Normal breath sounds. No decreased breath sounds, wheezing, rhonchi or rales.   Chest:      Chest wall: No tenderness.   Abdominal:      General: Bowel sounds are normal. There is no distension.      Palpations: Abdomen is soft.      Tenderness: There is no abdominal tenderness. There is no guarding.   Musculoskeletal:         General: Normal range of motion.      Cervical back: Normal range of motion and neck supple.   Skin:     General: Skin is warm and dry.      Findings: No erythema or rash.   Neurological:      Mental Status: She is alert and oriented to person, place, and time. She is not disoriented.      Sensory: No sensory deficit.      Motor: No abnormal muscle tone.      Coordination: Coordination normal.      Gait: Gait normal.   Psychiatric:         Mood and Affect: Mood normal.         Behavior: Behavior normal.         Thought Content: Thought content normal.         Judgment: Judgment normal.            Significant Labs: Pre procedure labs from 3/13/24 reviewed    Significant Imaging:  ECG

## 2024-03-21 NOTE — ANESTHESIA POSTPROCEDURE EVALUATION
Anesthesia Post Evaluation    Patient: Maria Dolores Tamayo    Procedure(s) Performed: Procedure(s) (LRB):  Left atrial appendage occlusion (N/A)  Transesophageal echo (ROWDY) intra-procedure log documentation (N/A)    Final Anesthesia Type: general      Patient location during evaluation: PACU  Patient participation: Yes- Able to Participate  Level of consciousness: awake and alert and oriented  Post-procedure vital signs: reviewed and stable  Pain management: adequate  Airway patency: patent    PONV status at discharge: No PONV  Anesthetic complications: no      Cardiovascular status: stable  Respiratory status: unassisted, spontaneous ventilation and room air  Hydration status: euvolemic  Follow-up not needed.              Vitals Value Taken Time   /59 03/21/24 1346   Temp 36.9 °C (98.4 °F) 03/21/24 1308   Pulse 44 03/21/24 1400   Resp 15 03/21/24 1400   SpO2 98 % 03/21/24 1400   Vitals shown include unvalidated device data.      No case tracking events are documented in the log.      Pain/Angus Score: Angus Score: 9 (3/21/2024  1:15 PM)

## 2024-03-21 NOTE — TRANSFER OF CARE
"Anesthesia Transfer of Care Note    Patient: Maria Dolores Tamayo    Procedure(s) Performed: Procedure(s) (LRB):  Left atrial appendage occlusion (N/A)  Transesophageal echo (ROWDY) intra-procedure log documentation (N/A)    Patient location: PACU    Anesthesia Type: general    Transport from OR: Transported from OR on 6-10 L/min O2 by face mask with adequate spontaneous ventilation    Post assessment: no apparent anesthetic complications    Post vital signs: stable    Level of consciousness: awake, alert and oriented    Nausea/Vomiting: no nausea/vomiting    Complications: none    Transfer of care protocol was followed    Last vitals: Visit Vitals  /74   Pulse (!) 52   Temp 36.4 °C (97.5 °F) (Temporal)   Resp 18   Ht 5' 4" (1.626 m)   Wt 63.5 kg (140 lb)   LMP 02/06/2024 (Approximate)   SpO2 98%   Breastfeeding No   BMI 24.03 kg/m²     "

## 2024-03-21 NOTE — H&P
[facility] Short Stay Cardiac [facility]  Cardiac Electrophysiology  History and Physical     Admission Date: 3/21/2024  Code Status: Prior   Attending Provider: PLACIDO Cervantes MD   Principal Problem:Atrial fibrillation with RVR    Subjective:     Chief Complaint: Atrial fibrillation with RVR     HPI: Ms. 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     History obtained through patient report and clinic notes:    She was previously admitted from 4/22-25/2022 from her rehabilitation center with several alerts from her LifeVest, consistent with multiple events of atrial fibrillation with RVR.      10/5/2022 successful RFA pulmonary vein isolation     1/6/2024 During her last office visit with Dr. Cervantes, feeling overall quite well and has recovered fully following her prior ablation. She feels that she has returned to her previous baseline level of health with the exception of mild word-searching difficulty following her prior stroke. She denies any recurrent symptoms of palpitations following her ablation. She was previously maintained on amiodarone 200mg po daily, but following her successful PVI, we obtained an ambulatory event monitor after the blanking period revealing no recurrent atrial fibrillation, and this antiarrhythmic agent was successfully discontinued. She has a Sequella mobile andrea and denies any recurrent episodes of atrial fibrillation, and denies any symptoms of racing heart rates or recurrent palpitations. While previously on amiodarone therapy, she reported symptoms of vivid dreams and fatigue on this medication. Since  discontinuation these side effects have entirely resolved. She has had recovery of her systolic function, with most recent LVEF 55%. She has been vigilant to wear her Apple watch, and denies any further episodes of atrial fibrillation on her Apple watch. She is very vigilant to continue her apixaban 2.5mg po BID. Her dose was decreased from 5mg po BID following significant uterine bleeding, and she denies any subsequent events of bleeding since reducing her dose of apixaban. She remains very concerned about increased uterine bleeding in the future as she is going through menopause presently. She remains in sinus rhythm on all subsequent ECGs, with no evidence of prolonged QT, nonsustained or sustained VT. 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 I called her outside hospital in an effort to obtain strips of her prior episode of WCT. Unfortunately, this event occurred in the CT scanner and no strips or ECGs were recorded. Currently, she has no indication for ICD implantation. We previously discussed obtaining a cardiac MRI in order to further assess for possible VT substrate in the future; however, as she has not had any subsequent events of arrhythmia following her successful ablation, we will continue to monitor at this time.      She denies any episodes of dizziness, lightheadedness, syncope/presyncope, chest pain or chest discomfort, palpitations, nausea or vomiting, orthopnea, lower extremity edema, or PND. She reports baseline mild shortness of breath and dyspnea with exertion that she feels has remained stable for her. She reports that she remains on Mounjaro and has been making excellent progress with weight loss. She can climb more than one flight of stairs prior to needing to take a break and remains very physically active without reported limitation.     Following her successful pulmonary vein isolation procedure she denies any subsequent  "episodes of atrial fibrillation on both symptom review and on her Apple watch recordings. We reviewed the results of her subsequent 48-hour Holter monitor that did not reveal any events of recurrent atrial fibrillation. She has been successfully discontinued from amiodarone 200mg po daily with maintenance of sinus rhythm.   She has been discontinued from her lisinopril therapy as her blood pressures remain at goal off of any antihypertensive agent.    Her [id number] is 3 (prior CVA, female gender, age less than 64), portending an annual adjusted risk of CVA of 3.2%. She remains on uninterrupted apixaban 2.5mg po BID. This dose was reduced from her previous 5mg po BID secondary to significant uterine bleeding. She is very interested in undergoing a WATCHMAN left atrial appendage occlusion procedure in an effort to discontinue indefinite anticoagulation given her adverse bleeding. Her HAS-BLED score is elevated at 4, portending an annual adjusted risk of adverse bleeding of 8.7%, with her risk of recurrent adverse bleeding outweighing her risk of CVA. We discussed the procedure of undergoing a WATCHMAN in detail, in addition to review of the potential risks, benefits, and alternative treatment options. Ms. Taamyo voices understanding and is in agreement with proceeding. Informed consent was obtained. She will remain on dose-reduced apixaban before and following her WATCHMAN until an adequate seal can be confirmed on ROWDY.  She has recovery of her systolic function with no recorded VT. My suspicion was that her prior episode of reported "wide complex tachycardia" was actually aberrant atrial fibrillation with RVR at OSH rather than true VT - this event occurred in a CT scanner with no tracings available to confirm. We discussed obtaining a cardiac MRI following her ablation for complete assessment of LGE for risk stratification for potential VT; however, she has not had any subsequent palpitations, and she would " "prefer to defer this imaging at this time.   Possible underlying drivers of atrial fibrillation were addressed at this appointment, including recommendations for maintenance of a healthy BMI - now a class I recommendation. Review of laboratory data revealed acceptable TSH at 1.863. She denies any loud snoring or excessive daytime sleepiness.    This patient will present to the EP laboratory to undergo implantation of a WATCHMAN left atrial appendage occlusion device. She will remain on apixaban oral anticoagulation. All questions and concerns were addressed at this encounter.     Ms. Tamayo presents today to [facility] for scheduled implantation of a WATCHMAN left atrial appendage occlusion device with Dr. Cervantes. She denies any chest pain, palpitations, SOB, ALCARAZ, dizziness, light headedness, weakness, syncope, or near syncopal episodes. She denies any bleeding (she does endorse non-menstrual "spotting" today. She does have a hx of this-UPT today negative), infections, fevers, rashes, or surgeries in the past 30 days. She currently is taking eliquis 2.5 mg BID (last dose taken 3/19/2024 PM), lasix 20 mg daily PRN (hasn't taken recently), and mounjaro (held since 3 weeks ago).    ECG today shows sinus bradycardia at 55 bpm  ms QRS 74 ms QT/Qtc 430/411 ms.  Past Medical History:   Diagnosis Date    Abnormal Pap smear of cervix     Anticoagulant long-term use     CHF (congestive heart failure)     Coronary artery disease     Encounter for blood transfusion     Heart murmur     atrial fibrillation    Myocardial infarction     Stroke        Past Surgical History:   Procedure Laterality Date    ABLATION OF ARRHYTHMOGENIC FOCUS FOR ATRIAL FIBRILLATION N/A 10/05/2022    Procedure: Ablation atrial fibrillation;  Surgeon: PLACIDO Cervantes MD;  Location: [facility] EP LAB;  Service: Cardiology;  Laterality: N/A;  AF, ROWDY, PVI, RFA, Carto, Gen, EH, 3 Prep    AUGMENTATION OF BREAST      COSMETIC SURGERY      heart abasian  " 01/13/2023    UTERINE ARTERY EMBOLIZATION  2000       Review of patient's allergies indicates:  No Known Allergies    No current facility-administered medications on file prior to encounter.     Current Outpatient Medications on File Prior to Encounter   Medication Sig    ASHWAGANDHA ROOT EXTRACT ORAL Take by mouth.    ergocalciferol, vitamin D2, (VITAMIN D ORAL) Take by mouth.    magnesium chloride (MAG 64 ORAL) Take by mouth.    UNABLE TO FIND Nutrascripts pregenolone sr 100mg    UNABLE TO FIND Nutrascripts DHEA Sr 5 mg    apixaban (ELIQUIS) 2.5 mg Tab Take 1 tablet (2.5 mg total) by mouth 2 (two) times daily.    furosemide (LASIX) 20 MG tablet Take 1 tablet (20 mg total) by mouth daily as needed. edema    MOUNJARO 10 mg/0.5 mL PnIj SMARTSIG:10 Milligram(s) SUB-Q Once a Week     Family History       Problem Relation (Age of Onset)    Arthritis Father    Cancer Maternal Aunt    Diabetes Paternal Grandmother    Ovarian cancer Maternal Aunt (60)    Progressive Supranuclear Palsy Mother (73)          Tobacco Use    Smoking status: Never    Smokeless tobacco: Never   Substance and Sexual Activity    Alcohol use: Not Currently     Comment: rare    Drug use: No    Sexual activity: Yes     Partners: Male     Birth control/protection: None     Review of Systems   Constitutional: Negative for chills, fever and malaise/fatigue.   HENT:  Negative for congestion and nosebleeds.    Eyes:  Negative for blurred vision.   Cardiovascular:  Negative for chest pain, dyspnea on exertion, irregular heartbeat, leg swelling, near-syncope, orthopnea, palpitations, paroxysmal nocturnal dyspnea and syncope.   Respiratory:  Negative for cough, hemoptysis, shortness of breath, sleep disturbances due to breathing, sputum production and wheezing.    Endocrine: Negative for polyphagia.   Hematologic/Lymphatic: Negative for bleeding problem. Does not bruise/bleed easily.   Skin:  Negative for itching and rash.   Musculoskeletal:  Negative for  "back pain, joint swelling, muscle cramps and muscle weakness. Reports full and symmetric strength.  Gastrointestinal:  Negative for bloating, abdominal pain, hematemesis, hematochezia, nausea and vomiting.   Genitourinary:  Positive for non-menstrual bleeding. Negative for dysuria and hematuria.   Neurological: Positive for speech difficulty. Negative for dizziness, focal weakness, headaches, light-headedness, loss of balance, numbness and weakness. Mild word-searching difficulty following her prior stroke.     Psychiatric/Behavioral:  Negative for altered mental status     Objective:     Vital Signs (Most Recent):     Vitals:    03/21/24 0816 03/21/24 0828   BP: (!) 114/55 103/74   BP Location: Right arm    Patient Position: Lying    Pulse: (!) 52    Resp: 18    Temp: 97.5 °F (36.4 °C)    TempSrc: Temporal    SpO2: 98%    Weight: 63.5 kg (140 lb)    Height: 5' 4" (1.626 m)       Vital Signs (24h Range):        Physical Exam  Vitals and nursing note reviewed.   Constitutional:       General: She is not in acute distress.     Appearance: Normal appearance. She is well-developed. She is not diaphoretic. Well-appearing woman in NAD.    HENT:      Head: Normocephalic and atraumatic.      Mouth/Throat:      Mouth: Mucous membranes are moist.      Pharynx: No oropharyngeal exudate.   Eyes:      Conjunctiva/sclera: Conjunctivae normal.      Pupils: Pupils are equal, round, and reactive to light.   Cardiovascular:      Rate and Rhythm: Regular rhythm. Bradycardia present.      Pulses: Normal pulses and intact distal pulses.           Radial pulses are 2+ on the right side and 2+ on the left side.        Dorsalis pedis pulses are 2+ on the right side and 2+ on the left side.      Heart sounds: S1 normal and S2 normal. No murmur heard.   Pulmonary:      Effort: Pulmonary effort is normal. No accessory muscle usage or respiratory distress.      Breath sounds: Normal breath sounds. No decreased breath sounds, wheezing, rhonchi " or rales.   Chest:      Chest wall: No tenderness.   Abdominal:      General: Bowel sounds are normal. There is no distension.      Palpations: Abdomen is soft.      Tenderness: There is no abdominal tenderness. There is no guarding.   Musculoskeletal:         General: Normal range of motion.      Cervical back: Normal range of motion and neck supple.   Skin:     General: Skin is warm and dry.      Findings: No erythema or rash.   Neurological:      Mental Status: She is alert and oriented to person, place, and time. She is not disoriented.      Sensory: No sensory deficit.      Motor: No abnormal muscle tone.      Coordination: Coordination normal.      Gait: Gait normal.   Psychiatric:         Mood and Affect: Mood normal.         Behavior: Behavior normal.         Thought Content: Thought content normal.         Judgment: Judgment normal.     Significant Labs: Pre procedure labs from 3/13/24 reviewed.    Significant Imaging:  ECG today shows sinus bradycardia at 55 bpm  ms QRS 74 ms QT/Qtc 430/411 ms.  Assessment and Plan:   Plan:  -Implantation of a WATCHMAN left atrial appendage occlusion device.  -ROWDY prior to rule out thrombus  -Anesthesia for sedation    Dr. Cervantes at bedside. We discussed the procedure of undergoing a WATCHMAN in detail, in addition to review of the potential risks, benefits, and alternative treatment options. Ms. Tamayo voices understanding and is in agreement with proceeding. Informed consent was obtained.      Manju Parikh, ZEESHAN  Cardiac Electrophysiology  [facility]    Attending: Selene Cervantes MD

## 2024-03-22 NOTE — NURSING
Pt brought to room 1 on [facility] to finish out recovery. RN at bedside for handoff. Pt is AAOx4 and free from s/s of pain and distress. Pt's right groin suture is intact and site is free from s/s of bleeding. Pt has call bell and other safety measures in place. Pt's  called to the bedside.

## 2024-03-22 NOTE — NURSING
Pt given d/c paperwork and education reiterated. All questions and concerns addressed. Pt verbalized understanding of d/c info and restrictions. Pt was able to drink, eat, walk, and use restroom without issues. Pt is AAOx4 and free from s/s of pain and distress. Pt's right groin site suture was removed and  gauze/tegaderm applied to site. Dressing is intact and site is free from s/s of bleeding.  Pt's iv and tele removed. Pt waiting to be wheeled down to car with family.

## 2024-03-22 NOTE — DISCHARGE SUMMARY
[facility] - Short Stay Cardiac Unit  Cardiac Electrophysiology  Discharge Summary      Patient Name: Maria Dolores Tamayo  MRN: [id number]  Admission Date: 3/21/2024  Hospital Length of Stay: 1 days  Discharge Date and Time: 3/21/2024  7:02 PM  Attending Physician: Selene Cervantes MD   Discharging Provider: Manju Parikh NP  Primary Care Physician: Rebecca Wright MD    HPI: Ms. 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      History obtained through patient report and clinic notes:     She was previously admitted from 4/22-25/2022 from her rehabilitation center with several alerts from her LifeVest, consistent with multiple events of atrial fibrillation with RVR.      10/5/2022 successful RFA pulmonary vein isolation      1/6/2024 During her last office visit with Dr. Cervantes, feeling overall quite well and has recovered fully following her prior ablation. She feels that she has returned to her previous baseline level of health with the exception of mild word-searching difficulty following her prior stroke. She denies any recurrent symptoms of palpitations following her ablation. She was previously maintained on amiodarone 200mg po daily, but following her successful PVI, we obtained an ambulatory event monitor after the blanking period revealing no recurrent atrial fibrillation, and this antiarrhythmic agent was successfully discontinued. She has a [employer] and[place] and denies any recurrent episodes of atrial fibrillation, and denies any symptoms of racing heart rates or recurrent palpitations. While previously on amiodarone therapy,  she reported symptoms of vivid dreams and fatigue on this medication. Since discontinuation these side effects have entirely resolved. She has had recovery of her systolic function, with most recent LVEF 55%. She has been vigilant to wear her Apple watch, and denies any further episodes of atrial fibrillation on her Apple watch. She is very vigilant to continue her apixaban 2.5mg po BID. Her dose was decreased from 5mg po BID following significant uterine bleeding, and she denies any subsequent events of bleeding since reducing her dose of apixaban. She remains very concerned about increased uterine bleeding in the future as she is going through menopause presently. She remains in sinus rhythm on all subsequent ECGs, with no evidence of prolonged QT, nonsustained or sustained VT. 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 I called her outside hospital in an effort to obtain strips of her prior episode of WCT. Unfortunately, this event occurred in the CT scanner and no strips or ECGs were recorded. Currently, she has no indication for ICD implantation. We previously discussed obtaining a cardiac MRI in order to further assess for possible VT substrate in the future; however, as she has not had any subsequent events of arrhythmia following her successful ablation, we will continue to monitor at this time.      She denies any episodes of dizziness, lightheadedness, syncope/presyncope, chest pain or chest discomfort, palpitations, nausea or vomiting, orthopnea, lower extremity edema, or PND. She reports baseline mild shortness of breath and dyspnea with exertion that she feels has remained stable for her. She reports that she remains on Mounjaro and has been making excellent progress with weight loss. She can climb more than one flight of stairs prior to needing to take a break and remains very physically active without reported limitation.      Following her  "successful pulmonary vein isolation procedure she denies any subsequent episodes of atrial fibrillation on both symptom review and on her Apple watch recordings. We reviewed the results of her subsequent 48-hour Holter monitor that did not reveal any events of recurrent atrial fibrillation. She has been successfully discontinued from amiodarone 200mg po daily with maintenance of sinus rhythm.   She has been discontinued from her lisinopril therapy as her blood pressures remain at goal off of any antihypertensive agent.    Her [id number] is 3 (prior CVA, female gender, age less than 64), portending an annual adjusted risk of CVA of 3.2%. She remains on uninterrupted apixaban 2.5mg po BID. This dose was reduced from her previous 5mg po BID secondary to significant uterine bleeding. She is very interested in undergoing a WATCHMAN left atrial appendage occlusion procedure in an effort to discontinue indefinite anticoagulation given her adverse bleeding. Her HAS-BLED score is elevated at 4, portending an annual adjusted risk of adverse bleeding of 8.7%, with her risk of recurrent adverse bleeding outweighing her risk of CVA. We discussed the procedure of undergoing a WATCHMAN in detail, in addition to review of the potential risks, benefits, and alternative treatment options. Ms. Tamayo voices understanding and is in agreement with proceeding. Informed consent was obtained. She will remain on dose-reduced apixaban before and following her WATCHMAN until an adequate seal can be confirmed on ROWDY.  She has recovery of her systolic function with no recorded VT. My suspicion was that her prior episode of reported "wide complex tachycardia" was actually aberrant atrial fibrillation with RVR at OSH rather than true VT - this event occurred in a CT scanner with no tracings available to confirm. We discussed obtaining a cardiac MRI following her ablation for complete assessment of LGE for risk stratification for potential VT; " "however, she has not had any subsequent palpitations, and she would prefer to defer this imaging at this time.   Possible underlying drivers of atrial fibrillation were addressed at this appointment, including recommendations for maintenance of a healthy BMI - now a class I recommendation. Review of laboratory data revealed acceptable TSH at 1.863. She denies any loud snoring or excessive daytime sleepiness.    This patient will present to the EP laboratory to undergo implantation of a WATCHMAN left atrial appendage occlusion device. She will remain on apixaban oral anticoagulation. All questions and concerns were addressed at this encounter.      Ms. Tamayo presents today to [facility] for scheduled implantation of a WATCHMAN left atrial appendage occlusion device with Dr. Cervantes. She denies any chest pain, palpitations, SOB, ALCARAZ, dizziness, light headedness, weakness, syncope, or near syncopal episodes. She denies any bleeding (she does endorse non-menstrual "spotting" today. She does have a hx of this-UPT today negative), infections, fevers, rashes, or surgeries in the past 30 days. She currently is taking eliquis 2.5 mg BID (last dose taken 3/19/2024 PM), lasix 20 mg daily PRN (hasn't taken recently), and mounjaro (held since 3 weeks ago).     ECG today shows sinus bradycardia at 55 bpm  ms QRS 74 ms QT/Qtc 430/411 ms.  Procedure(s) (LRB):  Left atrial appendage occlusion (N/A)  Transesophageal echo (ROWDY) intra-procedure log documentation (N/A)     Indwelling Lines/Drains at time of discharge:  Lines/Drains/Airways    None     Hospital Course: Mrs. Tamayo underwent ROWDY and successful placement of left atrial appendage closure with 24 mm Watchman device (see procedure note for details). She tolerated the procedure well and was without acute complications. Right groin post-procedure intact and without evidence of hematoma. Post procedure ECG showed sinus bradycardia at 52 bpm  ms QRS 76 ms QT/QTc 448/416 " ms. Post procedure CXR completed. Both post procedure ECG and CXR reviewed with Dr. Cervantes, both [facility]. She was monitored on telemetry. No acute arrhythmias. Completed bedrest, ambulated without difficulty. She reported feeling well and denied chest discomfort, shortness of breath, palpitations, lightheadedness, or any other acute symptoms. Discharge plans discussed with Dr. Cervantes, we will plan to resume all home medications, including eliquis for CVA prophylaxis. No need for Protonix as no burning was done. Post-procedure ROWDY in 45 days. Follow up with Dr. Cervantes in 3 months.. Discharge plans/instructions discussed with patient and  who verbalized understanding and agreement of plans of care. No further questions or concerns voiced at this time. Ms. Tamayo was discharged home in stable condition.      Goals of Care Treatment Preferences:  Code Status: Full Code    Consults: Cardiology ROWDY    Significant Diagnostic Studies:   ROWDY    ROWDY for intra-procedural guidance during implantation of Watchman device.    s/p implantation of 24 mm Watchman FLX. The device is well-seated with adequate compression. There is no hina-device leak. There is a small residual iatrogenic atrial septal defect (ASD) after completion of the procedure with left to right shunt. No pericardial effusion was seen throughout or following the procedure.    Left Atrium: Left atrium is dilated. The left atrial appendage appears normal. The left atrial appendage has a cauliflower morphology. Appendage velocity is normal at greater than 40 cm/sec. The pulmonary veins have systolic blunting. There is no thrombus in the cavity or WHITNEY.    Left Ventricle: There is normal systolic function with a visually estimated ejection fraction of 60 - 65%.    Right Ventricle: Normal right ventricular cavity size. Wall thickness is normal. Right ventricle wall motion  is normal. Systolic function is normal.    Aorta: Grade 2 atherosclerosis of the  descending aorta.  CXR  The radiograph was obtained in expiration and no pneumothorax noted.  There is a  watchman left atrial appendage closure device present.    Final Active Diagnoses:    Diagnosis Date Noted POA    PRINCIPAL PROBLEM:  Atrial fibrillation with RVR [I48.91] 04/22/2022 Yes      Problems Resolved During this Admission:     Discharged Condition: stable    Disposition: Home or Self Care    Follow Up:   Follow-up Information       [facility] ROWDY Follow up in 45 day(s).    Why: Post watchman implant  Contact information:  [facility] [id number]             PLACIDO Cervantes MD Follow up in 3 month(s).    Specialties: Electrophysiology, Cardiology  Why: Post watchman implant  Contact information:  [address]  [facility] [address]  [phone]                           Patient Instructions:      No driving until:     Other restrictions (specify):   Order Comments: Watchman Discharge Instructions:     Medications:  · Continue all home medications  · Anticoagulation: resume Eliquis this evening    Groin site management, precautions, and restrictions:  · Remove the bandages over your groin area the morning after your procedure. You can shower after you remove these bandages. Wash the sites at least once daily with soap and water. Keep the groin sites clean and dry. You do not need to apply ointments or bandages to the area.   · Inspect the groin site daily and report to the physician any signs of infection at the site: redness, pain, fever >100.4, unusual pain at the access site or affected extremity, unusual swelling at the access site, or any yellow, white or green drainage.  · Wear loose clothes and loose underwear.    If bleeding should occur at the groin sites following discharge:  · If oozing from groin site occurs, lay down and apply pressure to the puncture site with your fingers without letting up for 15 minutes and lay flat for 1 hour. If bleeding has resolved, you can continue to  monitor. If the bleeding continues or there is significant swelling or pain in the groin area, please call 911 immediately and visit the nearest ER for evaluation and treatment. Do not drive yourself to the hospital. DO NOT STOP TAKING YOUR BLOOD THINNER UNLESS INSTRUCTED BY A PHYSICIAN.     Activity Instructions:  · Do not drive or operate any dangerous machinery for 24 hours, but optimally 48-72 hours since you were given general anesthesia.   · Do not strain during bowel movements for the first 3 to 4 days after the procedure to prevent bleeding from the groin sites.  · Avoid heavy lifting (more than 10 pounds) and pushing or pulling heavy objects for the first 5 to 7 days after the procedure.  · Do not participate in strenuous activities for 5 days after the procedure. This includes most sports - jogging, golfing, play tennis, and bowling.  · You may climb stairs if needed, but walk up and down the stairs more slowly than usual.  · Gradually increase your activities until you reach your normal activity level within one week after the procedure.  · Do not take a bath or submerge your groin area (for example: Jacuzzi, swimming pool, or lake) or at least 1 week or when the puncture sites in your groin have completely healed.     Go to the Emergency Department if you develop:   · Change in color or temperature of the leg  · Redness, warmth, or drainage/pus at the groin sites  · Chills or fever greater than 101.0 F   · Severe bleeding or swelling at the groin sites  · Acute Weakness or numbness   · Visual, gait or speech disturbances   · New chest pain, palpitations, shortness of breath, fainting     Follow up:  · Post-procedure ROWDY in 45 days (KENDY Fernandez will reach out with the date and time)  · Follow up with Dr. Cervantes in 3 months.     Lifting restrictions     Notify your health care provider if you experience any of the following:  increased confusion or weakness     Notify your health care provider if you  experience any of the following:  persistent dizziness, light-headedness, or visual disturbances     Notify your health care provider if you experience any of the following:  worsening rash     Notify your health care provider if you experience any of the following:  severe persistent headache     Notify your health care provider if you experience any of the following:  difficulty breathing or increased cough     Notify your health care provider if you experience any of the following:  redness, tenderness, or signs of infection (pain, swelling, redness, odor or green/yellow discharge around incision site)     Notify your health care provider if you experience any of the following:  severe uncontrolled pain     Notify your health care provider if you experience any of the following:  persistent nausea and vomiting or diarrhea     Notify your health care provider if you experience any of the following:  temperature >100.4     Remove dressing in 24 hours     Weight bearing restrictions (specify):     Shower on day dressing removed (No bath)     Medications:  Reconciled Home Medications:      Medication List        CONTINUE taking these medications      apixaban 2.5 mg Tab  Commonly known as: ELIQUIS  Take 1 tablet (2.5 mg total) by mouth 2 (two) times daily.     ASHWAGANDHA ROOT EXTRACT ORAL  Take by mouth.     furosemide 20 MG tablet  Commonly known as: LASIX  Take 1 tablet (20 mg total) by mouth daily as needed. edema     MAG 64 ORAL  Take by mouth.     MOUNJARO 10 mg/0.5 mL Pnij  Generic drug: tirzepatide  SMARTSIG:10 Milligram(s) SUB-Q Once a Week     progesterone 200 MG capsule  Commonly known as: PROMETRIUM  Take 200 mg by mouth every evening. at bedtime     UNABLE TO FIND  Nutrascripts pregenolone sr 100mg     UNABLE TO FIND  Nutrascripts DHEA Sr 5 mg     VITAMIN D ORAL  Take by mouth.            Plan:  -Continue all home medications including eliquis  -ROWDY in 45 days  -Follow up with Dr. Cervantes in 3  months.    Time spent on the discharge of patient: 15 minutes    Manju Parikh NP  Cardiac Electrophysiology  [facility] - Arrhythmia    Attending: Selene Cervantes MD

## 2024-03-23 LAB
BLD PROD TYP BPU: NORMAL
BLOOD UNIT EXPIRATION DATE: NORMAL
BLOOD UNIT TYPE CODE: 5100
BLOOD UNIT TYPE: NORMAL
CODING SYSTEM: NORMAL
CROSSMATCH INTERPRETATION: NORMAL
DISPENSE STATUS: NORMAL
NUM UNITS TRANS PACKED RBC: NORMAL

## 2024-03-27 ENCOUNTER — PATIENT MESSAGE (OUTPATIENT)
Dept: ELECTROPHYSIOLOGY | Facility: CLINIC | Age: 54
End: 2024-03-27
Payer: COMMERCIAL

## 2024-04-05 DIAGNOSIS — Z79.01 CURRENT USE OF LONG TERM ANTICOAGULATION: Primary | ICD-10-CM

## 2024-04-05 DIAGNOSIS — Z95.818 PRESENCE OF WATCHMAN LEFT ATRIAL APPENDAGE CLOSURE DEVICE: ICD-10-CM

## 2024-04-08 ENCOUNTER — PATIENT MESSAGE (OUTPATIENT)
Dept: ELECTROPHYSIOLOGY | Facility: CLINIC | Age: 54
End: 2024-04-08
Payer: COMMERCIAL

## 2024-04-12 ENCOUNTER — PATIENT MESSAGE (OUTPATIENT)
Dept: ELECTROPHYSIOLOGY | Facility: CLINIC | Age: 54
End: 2024-04-12
Payer: COMMERCIAL

## 2024-04-22 ENCOUNTER — TELEPHONE (OUTPATIENT)
Dept: ELECTROPHYSIOLOGY | Facility: CLINIC | Age: 54
End: 2024-04-22
Payer: COMMERCIAL

## 2024-04-22 DIAGNOSIS — I48.91 ATRIAL FIBRILLATION WITH RVR: ICD-10-CM

## 2024-04-22 DIAGNOSIS — Z95.818 PRESENCE OF WATCHMAN LEFT ATRIAL APPENDAGE CLOSURE DEVICE: Primary | ICD-10-CM

## 2024-04-22 NOTE — TELEPHONE ENCOUNTER
Called patient to schedule her post op appointment with Dr. Cervantes. Patient scheduled for 5/17/24. Patient agreed to date and time.

## 2024-05-06 DIAGNOSIS — Z86.79 HISTORY OF ATRIAL FIBRILLATION: ICD-10-CM

## 2024-05-08 ENCOUNTER — TELEPHONE (OUTPATIENT)
Dept: ELECTROPHYSIOLOGY | Facility: CLINIC | Age: 54
End: 2024-05-08
Payer: COMMERCIAL

## 2024-05-08 ENCOUNTER — ANESTHESIA EVENT (OUTPATIENT)
Dept: MEDSURG UNIT | Facility: HOSPITAL | Age: 54
End: 2024-05-08
Payer: COMMERCIAL

## 2024-05-08 NOTE — TELEPHONE ENCOUNTER
Spoke with patient today to review tomorrow's procedure details and she indicated that she has been having weekly episodes of AF that only last for couple of minutes with -170's and that has started since the Watchman was done . She is scheduled for ROWDY tomorrow and will see Dr Cervantes on 5/17. Will review with Dr Cervantes. Rebecca gruber RN    Spoke to patient    CONFIRMED procedure arrival time of 11 AM on 5/9/24    Reiterated instructions including:  -Directions to check in desk  -NPO after midnight night prior to procedure  -High importance of HOLDING Mounjara and she has not taken in greater than 3 weeks  -Confirmed compliance of maintaining Eliquis as precribed  -Pre-procedure LABS were not applicable.  -Confirmed absence of implanted device/stimulator   -Confirmed no fever, cough, or shortness of breath in the past 30 days  -Confirmed no redness, rash, irritation, or yeast infection to groin area.   -Do not wear mascara day of procedure  --Reviewed current visitor policy    Patient verbalized understanding of above and appreciated the call.

## 2024-05-09 ENCOUNTER — ANESTHESIA (OUTPATIENT)
Dept: MEDSURG UNIT | Facility: HOSPITAL | Age: 54
End: 2024-05-09
Payer: COMMERCIAL

## 2024-05-09 ENCOUNTER — HOSPITAL ENCOUNTER (OUTPATIENT)
Facility: HOSPITAL | Age: 54
Discharge: HOME | End: 2024-05-09
Attending: STUDENT IN AN ORGANIZED HEALTH CARE EDUCATION/TRAINING PROGRAM | Admitting: STUDENT IN AN ORGANIZED HEALTH CARE EDUCATION/TRAINING PROGRAM
Payer: COMMERCIAL

## 2024-05-09 ENCOUNTER — HOSPITAL ENCOUNTER (OUTPATIENT)
Dept: CARDIOLOGY | Facility: HOSPITAL | Age: 54
Discharge: HOME | End: 2024-05-09
Attending: STUDENT IN AN ORGANIZED HEALTH CARE EDUCATION/TRAINING PROGRAM | Admitting: STUDENT IN AN ORGANIZED HEALTH CARE EDUCATION/TRAINING PROGRAM
Payer: COMMERCIAL

## 2024-05-09 VITALS
WEIGHT: 141 LBS | TEMPERATURE: 98 F | SYSTOLIC BLOOD PRESSURE: 107 MMHG | BODY MASS INDEX: 24.07 KG/M2 | OXYGEN SATURATION: 99 % | DIASTOLIC BLOOD PRESSURE: 51 MMHG | HEART RATE: 58 BPM | RESPIRATION RATE: 20 BRPM | HEIGHT: 64 IN

## 2024-05-09 VITALS
HEIGHT: 64 IN | SYSTOLIC BLOOD PRESSURE: 120 MMHG | DIASTOLIC BLOOD PRESSURE: 83 MMHG | HEART RATE: 59 BPM | WEIGHT: 141 LBS | BODY MASS INDEX: 24.07 KG/M2

## 2024-05-09 DIAGNOSIS — Z79.01 CURRENT USE OF LONG TERM ANTICOAGULATION: ICD-10-CM

## 2024-05-09 DIAGNOSIS — Z95.818 PRESENCE OF WATCHMAN LEFT ATRIAL APPENDAGE CLOSURE DEVICE: ICD-10-CM

## 2024-05-09 DIAGNOSIS — I48.0 PAROXYSMAL ATRIAL FIBRILLATION: ICD-10-CM

## 2024-05-09 DIAGNOSIS — I48.91 AF (ATRIAL FIBRILLATION): ICD-10-CM

## 2024-05-09 DIAGNOSIS — I48.91 ATRIAL FIBRILLATION: ICD-10-CM

## 2024-05-09 LAB
ASCENDING AORTA: 2.9 CM
B-HCG UR QL: NEGATIVE
BSA FOR ECHO PROCEDURE: 1.7 M2
CTP QC/QA: YES
OHS QRS DURATION: 76 MS
OHS QTC CALCULATION: 405 MS
SINUS: 3 CM
STJ: 2.7 CM

## 2024-05-09 PROCEDURE — 37000008 HC ANESTHESIA 1ST 15 MINUTES

## 2024-05-09 PROCEDURE — 37000009 HC ANESTHESIA EA ADD 15 MINS

## 2024-05-09 PROCEDURE — 93010 ELECTROCARDIOGRAM REPORT: CPT | Mod: ,,, | Performed by: INTERNAL MEDICINE

## 2024-05-09 PROCEDURE — 81025 URINE PREGNANCY TEST: CPT | Performed by: STUDENT IN AN ORGANIZED HEALTH CARE EDUCATION/TRAINING PROGRAM

## 2024-05-09 PROCEDURE — D9220A PRA ANESTHESIA: Mod: CRNA,,, | Performed by: NURSE ANESTHETIST, CERTIFIED REGISTERED

## 2024-05-09 PROCEDURE — 63600175 PHARM REV CODE 636 W HCPCS: Performed by: NURSE ANESTHETIST, CERTIFIED REGISTERED

## 2024-05-09 PROCEDURE — 93312 ECHO TRANSESOPHAGEAL: CPT | Mod: 26,,, | Performed by: INTERNAL MEDICINE

## 2024-05-09 PROCEDURE — 93005 ELECTROCARDIOGRAM TRACING: CPT

## 2024-05-09 PROCEDURE — 93320 DOPPLER ECHO COMPLETE: CPT | Mod: 26,,, | Performed by: INTERNAL MEDICINE

## 2024-05-09 PROCEDURE — D9220A PRA ANESTHESIA: Mod: ANES,,, | Performed by: ANESTHESIOLOGY

## 2024-05-09 PROCEDURE — 93325 DOPPLER ECHO COLOR FLOW MAPG: CPT

## 2024-05-09 PROCEDURE — 25000003 PHARM REV CODE 250: Performed by: NURSE ANESTHETIST, CERTIFIED REGISTERED

## 2024-05-09 PROCEDURE — 93325 DOPPLER ECHO COLOR FLOW MAPG: CPT | Mod: 26,,, | Performed by: INTERNAL MEDICINE

## 2024-05-09 RX ORDER — PROPOFOL 10 MG/ML
VIAL (ML) INTRAVENOUS
Status: DISCONTINUED | OUTPATIENT
Start: 2024-05-09 | End: 2024-05-09

## 2024-05-09 RX ORDER — FENTANYL CITRATE 50 UG/ML
25 INJECTION, SOLUTION INTRAMUSCULAR; INTRAVENOUS EVERY 5 MIN PRN
Status: DISCONTINUED | OUTPATIENT
Start: 2024-05-09 | End: 2024-05-09 | Stop reason: HOSPADM

## 2024-05-09 RX ORDER — ASPIRIN 81 MG/1
81 TABLET ORAL DAILY
Start: 2024-05-09 | End: 2025-05-09

## 2024-05-09 RX ORDER — LIDOCAINE HYDROCHLORIDE 20 MG/ML
INJECTION INTRAVENOUS
Status: DISCONTINUED | OUTPATIENT
Start: 2024-05-09 | End: 2024-05-09

## 2024-05-09 RX ORDER — ONDANSETRON HYDROCHLORIDE 2 MG/ML
4 INJECTION, SOLUTION INTRAVENOUS DAILY PRN
Status: DISCONTINUED | OUTPATIENT
Start: 2024-05-09 | End: 2024-05-09 | Stop reason: HOSPADM

## 2024-05-09 RX ORDER — CLOPIDOGREL BISULFATE 75 MG/1
75 TABLET ORAL DAILY
Qty: 90 TABLET | Refills: 1 | Status: SHIPPED | OUTPATIENT
Start: 2024-05-09 | End: 2024-05-17

## 2024-05-09 RX ORDER — LIDOCAINE HYDROCHLORIDE 20 MG/ML
SOLUTION OROPHARYNGEAL
Status: DISCONTINUED | OUTPATIENT
Start: 2024-05-09 | End: 2024-05-09

## 2024-05-09 RX ADMIN — PROPOFOL 70 MG: 10 INJECTION, EMULSION INTRAVENOUS at 01:05

## 2024-05-09 RX ADMIN — PROPOFOL 30 MG: 10 INJECTION, EMULSION INTRAVENOUS at 01:05

## 2024-05-09 RX ADMIN — LIDOCAINE HYDROCHLORIDE 100 MG: 20 INJECTION INTRAVENOUS at 01:05

## 2024-05-09 RX ADMIN — LIDOCAINE HYDROCHLORIDE 15 ML: 20 SOLUTION ORAL at 01:05

## 2024-05-09 RX ADMIN — SODIUM CHLORIDE: 0.9 INJECTION, SOLUTION INTRAVENOUS at 01:05

## 2024-05-09 NOTE — H&P
[facility]  Cardiology  History and Physical     Patient Name: Maria Dolores Tamayo  MRN: [id number]  Admission Date: 5/9/2024  Code Status: Prior   Attending Provider: PLACIDO Cervantes MD   Primary Care Physician: Rebecca Wright MD  Principal Problem:<principal problem not specified>    Patient information was obtained from patient and past medical records.     Subjective:     Chief Complaint:  S/p Watchman implantation      HPI:  Ms. 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 She has been anticoagulated on Eliquis 2.5 mg bid. She is on a reduced dose due to history of uterine bleeding. She underwent successful LAAO with a 24 mm Watchman FLX device on 3/21/2024. She was continued on Eliquis 2.5 mg bid on discharge. She presents today for ROWDY to assess for any significant hina-device leaks.     ROWDY 3/21/2024    ROWDY for intra-procedural guidance during implantation of Watchman device.    s/p implantation of 24 mm Watchman FLX. The device is well-seated with adequate compression. There is no hina-device leak. There is a small residual iatrogenic atrial septal defect (ASD) after completion of the procedure with left to right shunt. No pericardial effusion was seen throughout or following the procedure.    Left Atrium: Left atrium is dilated. The left atrial appendage appears normal. The left atrial appendage has a cauliflower morphology. Appendage velocity is normal at greater than 40 cm/sec. The pulmonary veins have systolic blunting. There is no thrombus in the cavity or WHITNEY.    Left Ventricle: There is normal systolic function with  a visually estimated ejection fraction of 60 - 65%.    Right Ventricle: Normal right ventricular cavity size. Wall thickness is normal. Right ventricle wall motion  is normal. Systolic function is normal.    Aorta: Grade 2 atherosclerosis of the descending aorta.    Anticoagulant/antiplatelets: Eliquis 2.5 mg bid   ECG: Sinus bradycardia    Dysphagia or odynophagia:  No  Liver Disease, esophageal disease, or known varices:  No  Upper GI Bleeding: No  Snoring:  No  Sleep Apnea:  No  Prior neck surgery or radiation:  No  Able to move neck in all directions:  Yes  History of anesthetic difficulties:  No  Family history of anesthetic difficulties:  No  Last oral intake: yesterday before midnight  GLP-1 use: Mounjaro. Has not taken in over a week.     Platelet count: 256k  INR: 1.0        Past Medical History:   Diagnosis Date    Abnormal Pap smear of cervix     Anticoagulant long-term use     CHF (congestive heart failure)     Coronary artery disease     Encounter for blood transfusion     Heart murmur     atrial fibrillation    Myocardial infarction     Stroke        Past Surgical History:   Procedure Laterality Date    ABLATION OF ARRHYTHMOGENIC FOCUS FOR ATRIAL FIBRILLATION N/A 10/05/2022    Procedure: Ablation atrial fibrillation;  Surgeon: PLACIDO Cervantes MD;  Location: [facility] EP LAB;  Service: Cardiology;  Laterality: N/A;  AF, ROWDY, PVI, RFA, Carto, Gen, EH, 3 Prep    AUGMENTATION OF BREAST      COSMETIC SURGERY      ECHOCARDIOGRAM,TRANSESOPHAGEAL N/A 3/21/2024    Procedure: Transesophageal echo (ROWDY) intra-procedure log documentation;  Surgeon: Shane Kothari III, MD;  Location: [facility] EP LAB;  Service: Cardiology;  Laterality: N/A;  AF, ROWDY/WatchmanAranza, Gen, EH, 3prep    heart abasian  01/13/2023    OCCLUSION OF LEFT ATRIAL APPENDAGE N/A 3/21/2024    Procedure: Left atrial appendage occlusion;  Surgeon: PLACIDO Cervantes MD;  Location: [facility] EP LAB;  Service: Cardiology;  Laterality: N/A;  AF,  ROWDY/Watchman, Aranza, Gen, EH, 3prep    UTERINE ARTERY EMBOLIZATION  2000       Review of patient's allergies indicates:  No Known Allergies    No current facility-administered medications on file prior to encounter.     Current Outpatient Medications on File Prior to Encounter   Medication Sig    ASHWAGANDHA ROOT EXTRACT ORAL Take by mouth.    ergocalciferol, vitamin D2, (VITAMIN D ORAL) Take by mouth.    magnesium chloride (MAG 64 ORAL) Take by mouth.    UNABLE TO FIND Nutrascripts pregenolone sr 100mg    UNABLE TO FIND Nutrascripts DHEA Sr 5 mg    furosemide (LASIX) 20 MG tablet Take 1 tablet (20 mg total) by mouth daily as needed. edema    MOUNJARO 10 mg/0.5 mL PnIj SMARTSIG:10 Milligram(s) SUB-Q Once a Week     Family History       Problem Relation (Age of Onset)    Arthritis Father    Cancer Maternal Aunt    Diabetes Paternal Grandmother    Ovarian cancer Maternal Aunt (60)    Progressive Supranuclear Palsy Mother (73)          Tobacco Use    Smoking status: Never    Smokeless tobacco: Never   Substance and Sexual Activity    Alcohol use: Not Currently     Comment: rare    Drug use: No    Sexual activity: Yes     Partners: Male     Birth control/protection: None     Review of Systems   Constitutional: Negative for diaphoresis, malaise/fatigue, weight gain and weight loss.   HENT:  Negative for nosebleeds.    Eyes:  Negative for vision loss in left eye, vision loss in right eye and visual disturbance.   Cardiovascular:  Negative for chest pain, claudication, dyspnea on exertion, irregular heartbeat, leg swelling, near-syncope, orthopnea, palpitations, paroxysmal nocturnal dyspnea and syncope.   Respiratory:  Negative for cough, shortness of breath, sleep disturbances due to breathing, snoring and wheezing.    Hematologic/Lymphatic: Negative for bleeding problem. Does not bruise/bleed easily.   Skin:  Negative for poor wound healing and rash.   Musculoskeletal:  Negative for muscle cramps and myalgias.    Gastrointestinal:  Negative for bloating, abdominal pain, diarrhea, heartburn, melena, nausea and vomiting.   Genitourinary:  Negative for hematuria and nocturia.   Neurological:  Negative for brief paralysis, dizziness, headaches, light-headedness, numbness and weakness.   Psychiatric/Behavioral:  Negative for depression.    Allergic/Immunologic: Negative for hives.     Objective:     Vital Signs (Most Recent):  Temp: 99 °F (37.2 °C) (05/09/24 1117)  Pulse: (!) 46 (05/09/24 1117)  Resp: 20 (05/09/24 1117)  BP: (!) 118/58 (05/09/24 1118)  SpO2: 98 % (05/09/24 1117) Vital Signs (24h Range):  Temp:  [99 °F (37.2 °C)] 99 °F (37.2 °C)  Pulse:  [46] 46  Resp:  [20] 20  SpO2:  [98 %] 98 %  BP: (118)/(56-58) 118/58     Weight: 64 kg (141 lb)  Body mass index is 24.2 kg/m².    SpO2: 98 %       No intake or output data in the 24 hours ending 05/09/24 1136    Lines/Drains/Airways       Peripheral Intravenous Line  Duration                  Peripheral IV - Single Lumen 05/09/24 1123 20 G Left Antecubital <1 day                     Physical Exam  Vitals and nursing note reviewed.   Constitutional:       Appearance: She is well-developed. She is not diaphoretic.   HENT:      Head: Normocephalic and atraumatic.   Eyes:      Conjunctiva/sclera: Conjunctivae normal.   Neck:      Vascular: No carotid bruit or JVD.   Cardiovascular:      Rate and Rhythm: Normal rate and regular rhythm.      Pulses: Normal pulses and intact distal pulses.      Heart sounds: Normal heart sounds. No murmur heard.     No friction rub. No gallop.   Pulmonary:      Effort: Pulmonary effort is normal.      Breath sounds: Normal breath sounds. No rales.   Chest:      Chest wall: No tenderness.   Abdominal:      General: There is no distension.      Palpations: Abdomen is soft. There is no mass.      Tenderness: There is no abdominal tenderness.   Skin:     General: Skin is warm and dry.      Coloration: Skin is not pale.   Neurological:      Mental Status:  She is alert and oriented to person, place, and time.          Significant Labs: All pertinent lab results from the last 24 hours have been reviewed.  Assessment and Plan:     Paroxysmal atrial fibrillation  Patient is 6 weeks post LAAO with Watchman device. Here today for ROWDY to evaluate for hina-device leaks or device associated thrombus.   -No absolute contraindications of esophageal stricture, tumor, perforation, laceration,or diverticulum and/or active GI bleed  -The risks, benefits & alternatives of the procedure were explained to the patient.   -The risks of transesophageal echo include but are not limited to:  Dental trauma, esophageal trauma/perforation, bleeding, laryngospasm/brochospasm, aspiration, sore throat/hoarseness, & dislodgement of the endotracheal tube/nasogastric tube (where applicable).    -The risks of ANES monitored sedation include hypotension, respiratory depression, arrhythmias, bronchospasm, & death.    -Informed consent was obtained. The patient is agreeable to proceed with the procedure and all questions and concerns addressed.    Case discussed with an attending in echocardiography lab.    Further recommendations per attending addendum          VTE Risk Mitigation (From admission, onward)      None            Shila Yu PA-C  Cardiology   [facility]

## 2024-05-09 NOTE — ANESTHESIA PREPROCEDURE EVALUATION
05/09/2024  Maria Dolores Tamayo is a 54 y.o., female.  Pre-operative evaluation for Procedure(s) (LRB):  Transesophageal echo (ROWDY) intra-procedure log documentation (N/A)    Maria Dolores Tamayo is a 54 y.o. female   Last Mounjaro use: 4/18/24    Patient Active Problem List   Diagnosis    Atrial fibrillation with RVR    Chronic systolic heart failure    Diverticulitis    Uterine fibroid    Hx of myocarditis    Nonischemic cardiomyopathy    Paroxysmal atrial fibrillation    Dysfunctional uterine bleeding    Current use of long term anticoagulation    History of cerebrovascular accident (CVA) from left carotid artery occlusion involving left middle cerebral artery territory    H/O cardiac radiofrequency ablation    History of abnormal uterine bleeding       Past Surgical History:   Procedure Laterality Date    ABLATION OF ARRHYTHMOGENIC FOCUS FOR ATRIAL FIBRILLATION N/A 10/05/2022    Procedure: Ablation atrial fibrillation;  Surgeon: PLACIDO Cervantes MD;  Location: [facility] EP LAB;  Service: Cardiology;  Laterality: N/A;  AF, ROWDY, PVI, RFA, Carto, Gen, EH, 3 Prep    AUGMENTATION OF BREAST      COSMETIC SURGERY      ECHOCARDIOGRAM,TRANSESOPHAGEAL N/A 3/21/2024    Procedure: Transesophageal echo (ROWDY) intra-procedure log documentation;  Surgeon: Shane Kothari III, MD;  Location: [facility] EP LAB;  Service: Cardiology;  Laterality: N/A;  AF, ROWDY/Watchman, BosSci, Gen, EH, 3prep    heart abasian  01/13/2023    OCCLUSION OF LEFT ATRIAL APPENDAGE N/A 3/21/2024    Procedure: Left atrial appendage occlusion;  Surgeon: PLACIDO Cervantes MD;  Location: [facility] EP LAB;  Service: Cardiology;  Laterality: N/A;  AF, ROWDY/Watchman, BosSci, Gen, EH, 3prep    UTERINE ARTERY EMBOLIZATION  2000       Social History     Socioeconomic History    Marital status:    Tobacco Use    Smoking status: Never    Smokeless tobacco: Never  "  Substance and Sexual Activity    Alcohol use: Not Currently     Comment: rare    Drug use: No    Sexual activity: Yes     Partners: Male     Birth control/protection: None       No current facility-administered medications on file prior to encounter.     Current Outpatient Medications on File Prior to Encounter   Medication Sig Dispense Refill    ASHWAGANDHA ROOT EXTRACT ORAL Take by mouth.      ergocalciferol, vitamin D2, (VITAMIN D ORAL) Take by mouth.      furosemide (LASIX) 20 MG tablet Take 1 tablet (20 mg total) by mouth daily as needed. edema 30 tablet 0    magnesium chloride (MAG 64 ORAL) Take by mouth.      MOUNJARO 10 mg/0.5 mL PnIj SMARTSIG:10 Milligram(s) SUB-Q Once a Week      UNABLE TO FIND Nutrascripts pregenolone sr 100mg      UNABLE TO FIND Nutrascripts DHEA Sr 5 mg         Review of patient's allergies indicates:  No Known Allergies      CBC: No results for input(s): "WBC", "RBC", "HGB", "HCT", "PLT", "MCV", "MCH", "MCHC" in the last 72 hours.    CMP: No results for input(s): "NA", "K", "CL", "CO2", "BUN", "CREATININE", "GLU", "MG", "PHOS", "CALCIUM", "ALBUMIN", "PROT", "ALKPHOS", "ALT", "AST", "BILITOT" in the last 72 hours.    INR  No results for input(s): "PT", "INR", "PROTIME", "APTT" in the last 72 hours.      Diagnostic Studies:    EKG:   Results for orders placed or performed during the hospital encounter of 03/21/24   EKG 12-lead    Collection Time: 03/21/24  2:47 PM   Result Value Ref Range    QRS Duration 76 ms    [facility] QTC Calculation 416 ms    Narrative    Test Reason : Z95.818,    Vent. Rate : 052 BPM     Atrial Rate : 052 BPM     P-R Int : 160 ms          QRS Dur : 076 ms      QT Int : 448 ms       P-R-T Axes : 041 023 047 degrees     QTc Int : 416 ms    Sinus bradycardia  Low septal forces ; Abnormal R wave progression in the precordial leads  Otherwise Normal ECG  When compared with ECG of 21-MAR-2024 07:50,  No significant change was found  Confirmed by Sajan MAYORGA MD ([id number]) on " 3/21/2024 7:23:05 PM    Referred By: PLACIDO LIM           Confirmed By:Sajan MAYORGA MD       TTE:  Results for orders placed or performed during the hospital encounter of 04/22/22   Echo   Result Value Ref Range    Ascending aorta 2.69 cm    STJ 2.44 cm    AV mean gradient 5 mmHg    Ao peak joseluis 1.51 m/s    Ao VTI 27.71 cm    IVRT 114.18 msec    IVS 0.89 0.6 - 1.1 cm    LA size 3.50 cm    Left Atrium Major Axis 5.70 cm    Left Atrium Minor Axis 5.66 cm    LVIDd 5.33 3.5 - 6.0 cm    LVIDs 3.51 2.1 - 4.0 cm    LVOT diameter 1.92 cm    LVOT peak VTI 18.94 cm    Posterior Wall 1.03 0.6 - 1.1 cm    MV Peak A Joseluis 0.67 m/s    E wave deceleration time 189.88 msec    MV Peak E Joseluis 0.71 m/s    RA Major Axis 4.66 cm    RA Width 3.32 cm    RVDD 3.01 cm    Sinus 2.91 cm    TAPSE 2.62 cm    TDI LATERAL 0.13 m/s    TDI SEPTAL 0.08 m/s    LA WIDTH 4.23 cm    MV stenosis pressure 1/2 time 55.07 ms    LV Diastolic Volume 136.90 mL    LV Systolic Volume 51.34 mL    RV S' 18.10 cm/s    LVOT peak joseluis 1.05 m/s    LA volume (mod) 48.38 cm3    LV LATERAL E/E' RATIO 5.46 m/s    LV SEPTAL E/E' RATIO 8.88 m/s    FS 34 %    LA volume 71.48 cm3    LV mass 191.68 g    Left Ventricle Relative Wall Thickness 0.39 cm    AV valve area 1.98 cm2    AV Velocity Ratio 0.70     AV index (prosthetic) 0.68     MV valve area p 1/2 method 3.99 cm2    E/A ratio 1.06     Mean e' 0.11 m/s    LVOT area 2.9 cm2    LVOT stroke volume 54.81 cm3    AV peak gradient 9 mmHg    E/E' ratio 6.76 m/s    LV Systolic Volume Index 29.0 mL/m2    LV Diastolic Volume Index 77.34 mL/m2    LA Volume Index 40.4 mL/m2    LV Mass Index 108 g/m2    LA Volume Index (Mod) 27.3 mL/m2    BSA 1.81 m2    Right Atrial Pressure (from IVC) 3 mmHg    EF 55 %    Narrative    · The left ventricle is normal in size with eccentric hypertrophy and   normal systolic function. The estimated ejection fraction is 55%.  · Normal right ventricular size with normal right ventricular systolic   function.  ·  "Mild left atrial enlargement.  · Normal central venous pressure (3 mmHg).  · Atrial fibrillation present throughout the examination.        EF   Date Value Ref Range Status   04/23/2022 55 % Final      No results found for this or any previous visit.    ROWDY:  No results found for this or any previous visit.    Stress Test:  No results found for this or any previous visit.       LHC:  Results for orders placed during the hospital encounter of 03/21/24    Intra-Procedure Documentation    Narrative  ROWDY performed in the Invasive Lab  - See Procedure Log link below for nursing documentation  - See ROWDY order on Card Proc Tab for physician findings       PFT:  No results found for: "FEV1", "FVC", "TTX0SLU", "TLC", "DLCO"     ALLERGIES:   Review of patient's allergies indicates:  No Known Allergies  LDA:          Lines/Drains/Airways       None                  Anesthesia Evaluation      Airway   Mallampati: II  TM distance: > 6 cm  Neck ROM: Normal ROM  Dental    (+) Intact    Pulmonary    Cardiovascular   (+) dysrhythmias, CHF    Rate: Normal    Neuro/Psych      GI/Hepatic/Renal      Endo/Other    Abdominal                           Pre-op Assessment    I have reviewed the Patient Summary Reports.     I have reviewed the Nursing Notes. I have reviewed the NPO Status.   I have reviewed the Medications.     Review of Systems  Anesthesia Hx:  No problems with previous Anesthesia             Denies Family Hx of Anesthesia complications.    Denies Personal Hx of Anesthesia complications.                    Cardiovascular:         Dysrhythmias   CHF                                 Pulmonary:  Pulmonary Normal                       Renal/:  Renal/ Normal                 Hepatic/GI:  Hepatic/GI Normal                 Neurological:  Neurology Normal                                      Endocrine:  Endocrine Normal                Physical Exam  General: Well nourished    Airway:  Mallampati: II   Mouth Opening: Normal  TM " Distance: > 6 cm  Tongue: Normal  Neck ROM: Normal ROM    Dental:  Intact    Chest/Lungs:  Normal Respiratory Rate    Heart:  Rate: Normal        Anesthesia Plan  Type of Anesthesia, risks & benefits discussed:    Anesthesia Type: Gen Natural Airway, MAC  Intra-op Monitoring Plan: Standard ASA Monitors  Post Op Pain Control Plan: multimodal analgesia and IV/PO Opioids PRN  Induction:  IV  Airway Plan: Direct, Post-Induction  Informed Consent: Informed consent signed with the Patient and all parties understand the risks and agree with anesthesia plan.  All questions answered. Patient consented to blood products? Yes  ASA Score: 3  Day of Surgery Review of History & Physical: H&P Update referred to the surgeon/provider.    Ready For Surgery From Anesthesia Perspective.     .

## 2024-05-09 NOTE — HPI
Ms. 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 She has been anticoagulated on Eliquis 2.5 mg bid. She is on a reduced dose due to history of uterine bleeding. She underwent successful LAAO with a 24 mm Watchman FLX device on 3/21/2024. She was continued on Eliquis 2.5 mg bid on discharge. She presents today for ROWDY to assess for any significant hina-device leaks.     ROWDY 3/21/2024    ROWDY for intra-procedural guidance during implantation of Watchman device.    s/p implantation of 24 mm Watchman FLX. The device is well-seated with adequate compression. There is no hina-device leak. There is a small residual iatrogenic atrial septal defect (ASD) after completion of the procedure with left to right shunt. No pericardial effusion was seen throughout or following the procedure.    Left Atrium: Left atrium is dilated. The left atrial appendage appears normal. The left atrial appendage has a cauliflower morphology. Appendage velocity is normal at greater than 40 cm/sec. The pulmonary veins have systolic blunting. There is no thrombus in the cavity or WHITNEY.    Left Ventricle: There is normal systolic function with a visually estimated ejection fraction of 60 - 65%.    Right Ventricle: Normal right ventricular cavity size. Wall thickness is normal. Right ventricle wall motion  is normal. Systolic function is normal.    Aorta: Grade 2 atherosclerosis of the descending aorta.    Anticoagulant/antiplatelets: Eliquis 2.5 mg bid   ECG: Sinus bradycardia    Dysphagia or odynophagia:  No  Liver Disease, esophageal disease, or known varices:  No  Upper GI Bleeding: No  Snoring:   No  Sleep Apnea:  No  Prior neck surgery or radiation:  No  Able to move neck in all directions:  Yes  History of anesthetic difficulties:  No  Family history of anesthetic difficulties:  No  Last oral intake: yesterday before midnight  GLP-1 use: Mounjaro. Has not taken in over a week.     Platelet count: 256k  INR: 1.0

## 2024-05-09 NOTE — ANESTHESIA POSTPROCEDURE EVALUATION
Anesthesia Post Evaluation    Patient: Maria Dolores Tamayo    Procedure(s) Performed: Procedure(s) (LRB):  Transesophageal echo (ROWDY) intra-procedure log documentation (N/A)    Final Anesthesia Type: general      Patient location during evaluation: PACU  Patient participation: Yes- Able to Participate  Level of consciousness: awake and alert  Post-procedure vital signs: reviewed and stable  Pain management: adequate  Airway patency: patent    PONV status at discharge: No PONV  Anesthetic complications: no      Cardiovascular status: hemodynamically stable  Respiratory status: spontaneous ventilation  Follow-up not needed.              Vitals Value Taken Time   /58 05/09/24 1347   Temp 98.0 05/09/24 1353   Pulse 64 05/09/24 1352   Resp 20 05/09/24 1352   SpO2 98 % 05/09/24 1352   Vitals shown include unfiled device data.      No case tracking events are documented in the log.      Pain/Angus Score: No data recorded

## 2024-05-09 NOTE — PLAN OF CARE
Vss. Sb to sr noted on cm.  S/p jesika. Post watchman.  CARDS LEOBARDO shukla updated pt in ep pacu 2. Pt verbalizes understanding. Pt aaox4 follows commands.  Upon arrival, sr noted.  Prn EKG ordered if pt goes into afib on cm.  In ep pacu 2, no afib noted.  Pt tolerating water in ep pacu 2.  Viscous lidocaine given by crna, npo until 1412.  See flowsheet for full assessment.

## 2024-05-09 NOTE — SUBJECTIVE & OBJECTIVE
Past Medical History:   Diagnosis Date    Abnormal Pap smear of cervix     Anticoagulant long-term use     CHF (congestive heart failure)     Coronary artery disease     Encounter for blood transfusion     Heart murmur     atrial fibrillation    Myocardial infarction     Stroke        Past Surgical History:   Procedure Laterality Date    ABLATION OF ARRHYTHMOGENIC FOCUS FOR ATRIAL FIBRILLATION N/A 10/05/2022    Procedure: Ablation atrial fibrillation;  Surgeon: PLACIDO Cervantes MD;  Location: [facility] EP LAB;  Service: Cardiology;  Laterality: N/A;  AF, ROWDY, PVI, RFA, Carto, Gen, EH, 3 Prep    AUGMENTATION OF BREAST      COSMETIC SURGERY      ECHOCARDIOGRAM,TRANSESOPHAGEAL N/A 3/21/2024    Procedure: Transesophageal echo (ROWDY) intra-procedure log documentation;  Surgeon: Shane Kothari III, MD;  Location: [facility] EP LAB;  Service: Cardiology;  Laterality: N/A;  AF, ROWDY/Watchman, BosSci, Gen, EH, 3prep    heart abasian  01/13/2023    OCCLUSION OF LEFT ATRIAL APPENDAGE N/A 3/21/2024    Procedure: Left atrial appendage occlusion;  Surgeon: PLACIDO Cervantes MD;  Location: [facility] EP LAB;  Service: Cardiology;  Laterality: N/A;  AF, ROWDY/Watchman, BosSci, Gen, EH, 3prep    UTERINE ARTERY EMBOLIZATION  2000       Review of patient's allergies indicates:  No Known Allergies    No current facility-administered medications on file prior to encounter.     Current Outpatient Medications on File Prior to Encounter   Medication Sig    ASHWAGANDHA ROOT EXTRACT ORAL Take by mouth.    ergocalciferol, vitamin D2, (VITAMIN D ORAL) Take by mouth.    magnesium chloride (MAG 64 ORAL) Take by mouth.    UNABLE TO FIND Nutrascripts pregenolone sr 100mg    UNABLE TO FIND Nutrascripts DHEA Sr 5 mg    furosemide (LASIX) 20 MG tablet Take 1 tablet (20 mg total) by mouth daily as needed. edema    MOUNJARO 10 mg/0.5 mL PnIj SMARTSIG:10 Milligram(s) SUB-Q Once a Week     Family History       Problem Relation (Age of Onset)    Arthritis Father     Cancer Maternal Aunt    Diabetes Paternal Grandmother    Ovarian cancer Maternal Aunt (60)    Progressive Supranuclear Palsy Mother (73)          Tobacco Use    Smoking status: Never    Smokeless tobacco: Never   Substance and Sexual Activity    Alcohol use: Not Currently     Comment: rare    Drug use: No    Sexual activity: Yes     Partners: Male     Birth control/protection: None     Review of Systems   Constitutional: Negative for diaphoresis, malaise/fatigue, weight gain and weight loss.   HENT:  Negative for nosebleeds.    Eyes:  Negative for vision loss in left eye, vision loss in right eye and visual disturbance.   Cardiovascular:  Negative for chest pain, claudication, dyspnea on exertion, irregular heartbeat, leg swelling, near-syncope, orthopnea, palpitations, paroxysmal nocturnal dyspnea and syncope.   Respiratory:  Negative for cough, shortness of breath, sleep disturbances due to breathing, snoring and wheezing.    Hematologic/Lymphatic: Negative for bleeding problem. Does not bruise/bleed easily.   Skin:  Negative for poor wound healing and rash.   Musculoskeletal:  Negative for muscle cramps and myalgias.   Gastrointestinal:  Negative for bloating, abdominal pain, diarrhea, heartburn, melena, nausea and vomiting.   Genitourinary:  Negative for hematuria and nocturia.   Neurological:  Negative for brief paralysis, dizziness, headaches, light-headedness, numbness and weakness.   Psychiatric/Behavioral:  Negative for depression.    Allergic/Immunologic: Negative for hives.     Objective:     Vital Signs (Most Recent):  Temp: 99 °F (37.2 °C) (05/09/24 1117)  Pulse: (!) 46 (05/09/24 1117)  Resp: 20 (05/09/24 1117)  BP: (!) 118/58 (05/09/24 1118)  SpO2: 98 % (05/09/24 1117) Vital Signs (24h Range):  Temp:  [99 °F (37.2 °C)] 99 °F (37.2 °C)  Pulse:  [46] 46  Resp:  [20] 20  SpO2:  [98 %] 98 %  BP: (118)/(56-58) 118/58     Weight: 64 kg (141 lb)  Body mass index is 24.2 kg/m².    SpO2: 98 %       No intake or  output data in the 24 hours ending 05/09/24 1136    Lines/Drains/Airways       Peripheral Intravenous Line  Duration                  Peripheral IV - Single Lumen 05/09/24 1123 20 G Left Antecubital <1 day                     Physical Exam  Vitals and nursing note reviewed.   Constitutional:       Appearance: She is well-developed. She is not diaphoretic.   HENT:      Head: Normocephalic and atraumatic.   Eyes:      Conjunctiva/sclera: Conjunctivae normal.   Neck:      Vascular: No carotid bruit or JVD.   Cardiovascular:      Rate and Rhythm: Normal rate and regular rhythm.      Pulses: Normal pulses and intact distal pulses.      Heart sounds: Normal heart sounds. No murmur heard.     No friction rub. No gallop.   Pulmonary:      Effort: Pulmonary effort is normal.      Breath sounds: Normal breath sounds. No rales.   Chest:      Chest wall: No tenderness.   Abdominal:      General: There is no distension.      Palpations: Abdomen is soft. There is no mass.      Tenderness: There is no abdominal tenderness.   Skin:     General: Skin is warm and dry.      Coloration: Skin is not pale.   Neurological:      Mental Status: She is alert and oriented to person, place, and time.          Significant Labs: All pertinent lab results from the last 24 hours have been reviewed.

## 2024-05-09 NOTE — NURSING TRANSFER
Nursing Transfer Note      5/9/2024   3:08 PM    Nurse giving handoff:tanvi neri   Nurse receiving handoff:noelle sscu rn    Reason patient is being transferred: ep pacu 2 to sscu 12/home    Transfer To: ep pacu 2 to sscu 12/home    Transfer via stretcher    Transfer with none. Discharge orders in place    Transported by tanvi neri    Transfer Vital Signs:  Blood Pressure:107/59  Heart Rate:58  O2:99% room air  Temperature:97.8  Respirations:18    Telemetry: none discharge orders in  Order for Tele Monitor? No    Additional Lines: none    4eyes on Skin: yes    Medicines sent: none    Any special needs or follow-up needed: none. Npo until 1412    Patient belongings transferred with patient: Yes    Chart send with patient: Yes    Notified: spouse    Patient reassessed at: 5/9/24 1330  Upon arrival to floor: patient oriented to room, call bell in reach, and bed in lowest position

## 2024-05-09 NOTE — DISCHARGE SUMMARY
[facility]  Cardiology  Discharge Summary      Patient Name: Maria Dolores Tamayo  MRN: [id number]  Admission Date: 5/9/2024  Hospital Length of Stay: 0 days  Discharge Date and Time:  05/09/2024 2:08 PM  Attending Physician: PLACIDO Cervantes MD    Discharging Provider: Shila Yu PA-C  Primary Care Physician: Rebecca Wright MD    HPI:   Ms. 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 She has been anticoagulated on Eliquis 2.5 mg bid. She is on a reduced dose due to history of uterine bleeding. She underwent successful LAAO with a 24 mm Watchman FLX device on 3/21/2024. She was continued on Eliquis 2.5 mg bid on discharge. She presents today for ROWDY to assess for any significant hina-device leaks.     ROWDY 3/21/2024    ROWDY for intra-procedural guidance during implantation of Watchman device.    s/p implantation of 24 mm Watchman FLX. The device is well-seated with adequate compression. There is no hina-device leak. There is a small residual iatrogenic atrial septal defect (ASD) after completion of the procedure with left to right shunt. No pericardial effusion was seen throughout or following the procedure.    Left Atrium: Left atrium is dilated. The left atrial appendage appears normal. The left atrial appendage has a cauliflower morphology. Appendage velocity is normal at greater than 40 cm/sec. The pulmonary veins have systolic blunting. There is no thrombus in the cavity or WHITNEY.    Left Ventricle: There is normal systolic function with a visually estimated ejection fraction of 60 - 65%.    Right Ventricle: Normal right ventricular  cavity size. Wall thickness is normal. Right ventricle wall motion  is normal. Systolic function is normal.    Aorta: Grade 2 atherosclerosis of the descending aorta.    Anticoagulant/antiplatelets: Eliquis 2.5 mg bid   ECG: Sinus bradycardia    Dysphagia or odynophagia:  No  Liver Disease, esophageal disease, or known varices:  No  Upper GI Bleeding: No  Snoring:  No  Sleep Apnea:  No  Prior neck surgery or radiation:  No  Able to move neck in all directions:  Yes  History of anesthetic difficulties:  No  Family history of anesthetic difficulties:  No  Last oral intake: yesterday before midnight  GLP-1 use: Mounjaro. Has not taken in over a week.     Platelet count: 256k  INR: 1.0        Procedure(s) (LRB):  Transesophageal echo (ROWDY) intra-procedure log documentation (N/A)     Indwelling Lines/Drains at time of discharge:  Lines/Drains/Airways       None                   Hospital Course:  Patient underwent ROWDY showing complete occlusion of WHITNEY with Watchman device with no hina-device leaks or thrombus. Plan to stop Eliquis and start dual antiplatelet therapy with Aspirin 81 mg daily and Plavix 75 mg daily.  Instructed to follow with Dr. Cervantes in clinic as scheduled. Patient was assessed at bedside prior to discharge, they reported feeling well and denied chest discomfort, shortness of breath, palpitations, lightheadedness, or any other acute symptoms. Discharge instructions were discussed with patient and all questions were answered. Patient was discharged home in stable condition.      Goals of Care Treatment Preferences:  Code Status: Full Code    Significant Diagnostic Studies: ROWDY - final report pending - prelim Watchman device in place with complete occlusion with no residual leaks or thrombus.     Pending Diagnostic Studies:       None            Final Active Diagnoses:    Diagnosis Date Noted POA    Paroxysmal atrial fibrillation [I48.0] 07/29/2022 Yes      Problems Resolved During this Admission:      No new Assessment & Plan notes have been filed under this hospital service since the last note was generated.  Service: Cardiology      Discharged Condition: stable    Disposition: Home or Self Care    Follow Up:   Follow-up Information       PLACIDO Cervantes MD. Go on 5/17/2024.    Specialties: Electrophysiology, Cardiology  Contact information:  [address]  [facility] [address]  [phone]                           Patient Instructions:   No discharge procedures on file.  Medications:  Reconciled Home Medications:      Medication List        START taking these medications      aspirin 81 MG EC tablet  Commonly known as: ECOTRIN  Take 1 tablet (81 mg total) by mouth once daily.     clopidogreL 75 mg tablet  Commonly known as: PLAVIX  Take 1 tablet (75 mg total) by mouth once daily.            CONTINUE taking these medications      ASHWAGANDHA ROOT EXTRACT ORAL  Take by mouth.     furosemide 20 MG tablet  Commonly known as: LASIX  Take 1 tablet (20 mg total) by mouth daily as needed. edema     MAG 64 ORAL  Take by mouth.     MOUNJARO 10 mg/0.5 mL Pnij  Generic drug: tirzepatide  SMARTSIG:10 Milligram(s) SUB-Q Once a Week     progesterone 200 MG capsule  Commonly known as: PROMETRIUM  Take 200 mg by mouth every evening. at bedtime     UNABLE TO FIND  Nutrascripts pregenolone sr 100mg     UNABLE TO FIND  Nutrascripts DHEA Sr 5 mg     VITAMIN D ORAL  Take by mouth.            STOP taking these medications      apixaban 2.5 mg Tab  Commonly known as: ELIQUIS              Time spent on the discharge of patient: 35 minutes    Shila Yu PA-C  Cardiology  [facility]

## 2024-05-09 NOTE — HOSPITAL COURSE
Patient underwent RODWY showing complete occlusion of WHITNEY with Watchman device with no hina-device leaks or thrombus. Plan to stop Eliquis and start dual antiplatelet therapy with Aspirin 81 mg daily and Plavix 75 mg daily.  Instructed to follow with Dr. Cervantes in clinic as scheduled. Patient was assessed at bedside prior to discharge, they reported feeling well and denied chest discomfort, shortness of breath, palpitations, lightheadedness, or any other acute symptoms. Discharge instructions were discussed with patient and all questions were answered. Patient was discharged home in stable condition.

## 2024-05-09 NOTE — TRANSFER OF CARE
"Anesthesia Transfer of Care Note    Patient: Maria Dolores Tamayo    Procedure(s) Performed: Procedure(s) (LRB):  Transesophageal echo (ROWDY) intra-procedure log documentation (N/A)    Patient location: Cath Lab    Anesthesia Type: general    Transport from OR: Transported from OR on room air with adequate spontaneous ventilation    Post pain: adequate analgesia    Post assessment: no apparent anesthetic complications and tolerated procedure well    Post vital signs: stable    Level of consciousness: awake, alert and oriented    Nausea/Vomiting: no nausea/vomiting    Complications: none    Transfer of care protocol was followed      Last vitals: Visit Vitals  /58 (BP Location: Right arm, Patient Position: Lying)   Pulse 69   Temp 37.2 °C (99 °F) (Temporal)   Resp 20   Ht 5' 4" (1.626 m)   Wt 64 kg (141 lb)   LMP 08/01/2023 (Approximate)   SpO2 96%   Breastfeeding No   BMI 24.20 kg/m²     "

## 2024-05-09 NOTE — DISCHARGE INSTRUCTIONS
Medications:  -Continue to take your home medications as listed on your medication list after you are discharged.    New Medications:  - START taking Aspirin 81 mg daily and Plavix 75 mg daily. Please start taking these medications today.  - STOP taking Eliquis    Follow up: in clinic with Dr. Cervantes as scheduled.    Diet  -You may resume oral intake after you are discharged, as long you have no swallowing difficulties.    Because you have received sedation for this procedure:  -Limit activity for the remainder of the day.  -Do not smoke for at least 6 hours and until you are fully awake and alert.  -Do not drink alcoholic beverage for 24 hours.  -Do not drive for 24 hours.  -Defer important decision making until the following day.     Go to the Emergency Department if you develop:   -Bleeding  -Weakness or numbness  -Visual, gait or speech disturbance  -New chest pain, palpitations, shortness of breath, rapid heart beat, or fainting  -Fever

## 2024-05-09 NOTE — NURSING
Patient discharged per MD orders. Instructions given on medications, wound care, activity, signs of infection, when to call MD, and follow up appointments. Pt and spouse verbalized understanding. Telemetry and PIV removed. Patient's  used wheelchair to transfer pt off of unit.

## 2024-05-09 NOTE — ASSESSMENT & PLAN NOTE
Patient is 6 weeks post LAAO with Watchman device. Here today for ROWDY to evaluate for hina-device leaks or device associated thrombus.   -No absolute contraindications of esophageal stricture, tumor, perforation, laceration,or diverticulum and/or active GI bleed  -The risks, benefits & alternatives of the procedure were explained to the patient.   -The risks of transesophageal echo include but are not limited to:  Dental trauma, esophageal trauma/perforation, bleeding, laryngospasm/brochospasm, aspiration, sore throat/hoarseness, & dislodgement of the endotracheal tube/nasogastric tube (where applicable).    -The risks of ANES monitored sedation include hypotension, respiratory depression, arrhythmias, bronchospasm, & death.    -Informed consent was obtained. The patient is agreeable to proceed with the procedure and all questions and concerns addressed.    Case discussed with an attending in echocardiography lab.    Further recommendations per attending addendum

## 2024-05-09 NOTE — NURSING
Received report from KENDY Alicia. Patient s/p ROWDY, AAOx4. VSS, no c/o pain or discomfort at this time, resp even and unlabored. Post procedure protocol reviewed with patient and patient's . Understanding verbalized.  at bedside. Nurse call bell within reach.

## 2024-05-17 ENCOUNTER — HOSPITAL ENCOUNTER (OUTPATIENT)
Dept: CARDIOLOGY | Facility: CLINIC | Age: 54
Discharge: HOME | End: 2024-05-17
Payer: COMMERCIAL

## 2024-05-17 ENCOUNTER — OFFICE VISIT (OUTPATIENT)
Dept: ELECTROPHYSIOLOGY | Facility: CLINIC | Age: 54
End: 2024-05-17
Payer: COMMERCIAL

## 2024-05-17 VITALS
WEIGHT: 150.13 LBS | DIASTOLIC BLOOD PRESSURE: 60 MMHG | HEART RATE: 56 BPM | SYSTOLIC BLOOD PRESSURE: 90 MMHG | HEIGHT: 64 IN | BODY MASS INDEX: 25.63 KG/M2

## 2024-05-17 DIAGNOSIS — D25.9 UTERINE LEIOMYOMA, UNSPECIFIED LOCATION: ICD-10-CM

## 2024-05-17 DIAGNOSIS — N93.8 DYSFUNCTIONAL UTERINE BLEEDING: ICD-10-CM

## 2024-05-17 DIAGNOSIS — I48.91 ATRIAL FIBRILLATION WITH RVR: ICD-10-CM

## 2024-05-17 DIAGNOSIS — K57.92 DIVERTICULITIS: ICD-10-CM

## 2024-05-17 DIAGNOSIS — Z95.818 PRESENCE OF WATCHMAN LEFT ATRIAL APPENDAGE CLOSURE DEVICE: ICD-10-CM

## 2024-05-17 DIAGNOSIS — I50.42 CHRONIC COMBINED SYSTOLIC AND DIASTOLIC HEART FAILURE: ICD-10-CM

## 2024-05-17 DIAGNOSIS — I42.8 NONISCHEMIC CARDIOMYOPATHY: ICD-10-CM

## 2024-05-17 DIAGNOSIS — E66.3 OVERWEIGHT (BMI 25.0-29.9): ICD-10-CM

## 2024-05-17 DIAGNOSIS — I48.0 PAROXYSMAL ATRIAL FIBRILLATION: Primary | ICD-10-CM

## 2024-05-17 DIAGNOSIS — Z87.42 HISTORY OF ABNORMAL UTERINE BLEEDING: ICD-10-CM

## 2024-05-17 DIAGNOSIS — Z86.79 HX OF MYOCARDITIS: ICD-10-CM

## 2024-05-17 DIAGNOSIS — Z98.890 H/O CARDIAC RADIOFREQUENCY ABLATION: ICD-10-CM

## 2024-05-17 DIAGNOSIS — I48.91 ATRIAL FIBRILLATION WITH RAPID VENTRICULAR RESPONSE: ICD-10-CM

## 2024-05-17 DIAGNOSIS — I63.40 CEREBROVASCULAR ACCIDENT (CVA) DUE TO EMBOLISM OF CEREBRAL ARTERY: ICD-10-CM

## 2024-05-17 DIAGNOSIS — I48.91 ATRIAL FIBRILLATION WITH RVR: Primary | ICD-10-CM

## 2024-05-17 DIAGNOSIS — Z86.73 HISTORY OF CEREBROVASCULAR ACCIDENT (CVA) FROM LEFT CAROTID ARTERY OCCLUSION INVOLVING LEFT MIDDLE CEREBRAL ARTERY TERRITORY: ICD-10-CM

## 2024-05-17 LAB
OHS QRS DURATION: 74 MS
OHS QTC CALCULATION: 411 MS

## 2024-05-17 PROCEDURE — 1159F MED LIST DOCD IN RCRD: CPT | Mod: CPTII,S$GLB,, | Performed by: STUDENT IN AN ORGANIZED HEALTH CARE EDUCATION/TRAINING PROGRAM

## 2024-05-17 PROCEDURE — 99999 PR PBB SHADOW E&M-EST. PATIENT-LVL III: CPT | Mod: PBBFAC,,, | Performed by: STUDENT IN AN ORGANIZED HEALTH CARE EDUCATION/TRAINING PROGRAM

## 2024-05-17 PROCEDURE — 93010 ELECTROCARDIOGRAM REPORT: CPT | Mod: S$GLB,,, | Performed by: INTERNAL MEDICINE

## 2024-05-17 PROCEDURE — 3008F BODY MASS INDEX DOCD: CPT | Mod: CPTII,S$GLB,, | Performed by: STUDENT IN AN ORGANIZED HEALTH CARE EDUCATION/TRAINING PROGRAM

## 2024-05-17 PROCEDURE — 3074F SYST BP LT 130 MM HG: CPT | Mod: CPTII,S$GLB,, | Performed by: STUDENT IN AN ORGANIZED HEALTH CARE EDUCATION/TRAINING PROGRAM

## 2024-05-17 PROCEDURE — 99214 OFFICE O/P EST MOD 30 MIN: CPT | Mod: S$GLB,,, | Performed by: STUDENT IN AN ORGANIZED HEALTH CARE EDUCATION/TRAINING PROGRAM

## 2024-05-17 PROCEDURE — 93005 ELECTROCARDIOGRAM TRACING: CPT | Mod: S$GLB,,, | Performed by: STUDENT IN AN ORGANIZED HEALTH CARE EDUCATION/TRAINING PROGRAM

## 2024-05-17 PROCEDURE — 3078F DIAST BP <80 MM HG: CPT | Mod: CPTII,S$GLB,, | Performed by: STUDENT IN AN ORGANIZED HEALTH CARE EDUCATION/TRAINING PROGRAM

## 2024-05-17 NOTE — PROGRESS NOTES
Electrophysiology Clinic Note    Reason for follow-up patient visit: Ongoing evaluation and recommendations regarding paroxysmal atrial fibrillation, s/p successful radiofrequency catheter ablation with pulmonary vein isolation on 10/5/2022 and successful implantation of a WATCHMAN left atrial appendage occlusion device on 3/21/2024.     PRESENTING HISTORY:     History of Present Illness:  Ms. Maria Dolores Tamayo is a alisa 54-year-old woman who returns to clinic today for ongoing evaluation and recommendations regarding paroxysmal atrial fibrillation, s/p successful radiofrequency catheter ablation with pulmonary vein isolation on 10/5/2022 and successful implantation of a WATCHMAN left atrial appendage occlusion device on 3/21/2024. 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 She was previously admitted from 4/22-25/2022 from her rehabilitation center with several alerts from her LifeVest, consistent with multiple events of atrial fibrillation with RVR. She returns to clinic today for ongoing evaluation following her successful radiofrequency catheter ablation with pulmonary vein isolation and for ongoing evaluation after completion of her post-implantation WATCHMAN left atrial appendage occlusion device.     In discussion with Ms. Tamayo, she tells me that she is feeling overall quite well and has recovered fully following her WATCHMAN left atrial appendage occlusion device. She feels that she has returned to her previous baseline level of health with the exception  of mild word-searching difficulty following her prior stroke. She denies any recurrent symptoms of sustained palpitations following her ablation; however, immediately after her WATCHMAN implantation she reported events of brief palpitations. She was previously maintained on amiodarone 200mg po daily, but following her successful PVI, we obtained an ambulatory event monitor after the blanking period revealing no recurrent atrial fibrillation, and this antiarrhythmic agent was successfully discontinued. She has a [employer] mobile andrea and denies any recurrent episodes of sustained atrial fibrillation, and denies any symptoms of racing heart rates or recurrent palpitations since the immediate period following her WATCHMAN implantation. While previously on amiodarone therapy, she reported symptoms of vivid dreams and fatigue on this medication. Since discontinuation these side effects have entirely resolved. She has had recovery of her systolic function, with most recent LVEF 60-65% with a well-seated WATCHMAN device with no evidence of peridevice leak. She has been vigilant to wear her Apple watch, and denies any further episodes of atrial fibrillation on her Apple watch. She was safely discontinued from her apixaban 2.5mg po BID following confirmation of a well-seated WATCHMAN and has been transitioned to DAPT with aspirin and clopidogrel therapy. She denies any events of adverse bleeding since transitioning to DAPT; however, she reports significant bruising on the clopidogrel. She remains in sinus rhythm on all subsequent ECGs, with no evidence of prolonged QT, nonsustained or sustained VT. 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 I called her outside hospital in an effort to obtain strips of her prior episode of WCT. Unfortunately, this event occurred in the CT scanner and no strips or ECGs were recorded. Currently, she has no indication for ICD  implantation. We previously discussed obtaining a cardiac MRI in order to further assess for possible VT substrate in the future; however, as she has not had any subsequent events of arrhythmia following her successful ablation, we will continue to monitor at this time.     She denies any episodes of dizziness, lightheadedness, syncope/presyncope, chest pain or chest discomfort, recurrent sustained palpitations, nausea or vomiting, orthopnea, lower extremity edema, or PND. She reports baseline mild shortness of breath and dyspnea with exertion that she feels has remained stable for her. She reports that she remains on Mounjaro and has been making excellent progress with weight loss. She can climb more than one flight of stairs prior to needing to take a break and remains very physically active without reported limitation. Her primary complaint today is the increased bruising while maintained on clopidogrel.     Review of Systems:  Review of Systems   Constitutional:  Negative for activity change.   HENT:  Negative for nasal congestion, nosebleeds, postnasal drip, rhinorrhea, sinus pressure/congestion, sneezing and sore throat.    Respiratory:  Positive for shortness of breath. Negative for apnea, cough, chest tightness and wheezing.    Cardiovascular:  Positive for palpitations. Negative for chest pain, leg swelling and claudication.        Brief palpitations immediately following her WATCHMAN implantation.    Gastrointestinal:  Negative for abdominal distention, abdominal pain, blood in stool, change in bowel habit, constipation, diarrhea, nausea and vomiting.   Genitourinary:  Negative for dysuria and hematuria.   Musculoskeletal:  Negative for gait problem.   Neurological:  Positive for speech difficulty. Negative for dizziness, seizures, syncope, weakness, light-headedness, headaches and coordination difficulties.        Mild word-searching difficulty following her prior stroke.    Hematological:  Bruises/bleeds  easily.        Increased bruising while on clopidogrel therapy.        PAST HISTORY:     Past Medical History:   Diagnosis Date    Abnormal Pap smear of cervix     Anticoagulant long-term use     CHF (congestive heart failure)     Coronary artery disease     Encounter for blood transfusion     Heart murmur     atrial fibrillation    Myocardial infarction     Stroke        Past Surgical History:   Procedure Laterality Date    ABLATION OF ARRHYTHMOGENIC FOCUS FOR ATRIAL FIBRILLATION N/A 10/05/2022    Procedure: Ablation atrial fibrillation;  Surgeon: PLACIDO Cervantes MD;  Location: [facility] EP LAB;  Service: Cardiology;  Laterality: N/A;  AF, ROWDY, PVI, RFA, Carto, Gen, EH, 3 Prep    AUGMENTATION OF BREAST      COSMETIC SURGERY      ECHOCARDIOGRAM,TRANSESOPHAGEAL N/A 3/21/2024    Procedure: Transesophageal echo (ROWDY) intra-procedure log documentation;  Surgeon: Shane Kothari III, MD;  Location: [facility] EP LAB;  Service: Cardiology;  Laterality: N/A;  AF, ROWDY/Watchman, BosSci, Gen, EH, 3prep    ECHOCARDIOGRAM,TRANSESOPHAGEAL N/A 5/9/2024    Procedure: Transesophageal echo (ROWDY) intra-procedure log documentation;  Surgeon: Provider, [facility];  Location: [facility] EP LAB;  Service: Cardiology;  Laterality: N/A;  AF/6 wk post Watchman, ROWDY, MAC. EH, 3prep    heart abasian  01/13/2023    OCCLUSION OF LEFT ATRIAL APPENDAGE N/A 3/21/2024    Procedure: Left atrial appendage occlusion;  Surgeon: PLACIDO Cervantes MD;  Location: [facility] EP LAB;  Service: Cardiology;  Laterality: N/A;  AF, ROWDY/Watchman, BosSci, Gen, EH, 3prep    UTERINE ARTERY EMBOLIZATION  2000       Family History:  Family History   Problem Relation Name Age of Onset    Progressive Supranuclear Palsy Mother 78 in 2023 73    Arthritis Father 79     Cancer Maternal Aunt mom 1/9         ovarian    Ovarian cancer Maternal Aunt mom 1/9 60    Diabetes Paternal Grandmother          prediabetic    Breast cancer Neg Hx      Colon cancer Neg Hx      Uterine cancer Neg  "Hx         Social History:  She  reports that she has never smoked. She has never used smokeless tobacco. She reports that she does not currently use alcohol. She reports that she does not use drugs.      MEDICATIONS & ALLERGIES:     Review of patient's allergies indicates:  No Known Allergies    Current Outpatient Medications on File Prior to Visit   Medication Sig Dispense Refill    ASHWAGANDHA ROOT EXTRACT ORAL Take by mouth.      aspirin (ECOTRIN) 81 MG EC tablet Take 1 tablet (81 mg total) by mouth once daily.      clopidogreL (PLAVIX) 75 mg tablet Take 1 tablet (75 mg total) by mouth once daily. 90 tablet 1    ergocalciferol, vitamin D2, (VITAMIN D ORAL) Take by mouth.      furosemide (LASIX) 20 MG tablet Take 1 tablet (20 mg total) by mouth daily as needed. edema 30 tablet 0    magnesium chloride (MAG 64 ORAL) Take by mouth.      MOUNJARO 10 mg/0.5 mL PnIj SMARTSIG:10 Milligram(s) SUB-Q Once a Week      progesterone (PROMETRIUM) 200 MG capsule Take 200 mg by mouth every evening. at bedtime      UNABLE TO FIND Nutrascripts pregenolone sr 100mg      UNABLE TO FIND Nutrascripts DHEA Sr 5 mg       Current Facility-Administered Medications on File Prior to Visit   Medication Dose Route Frequency Provider Last Rate Last Admin    0.9%  NaCl infusion (for blood administration)   Intravenous Q24H Manju Aguayo NP            OBJECTIVE:     Vital Signs:  BP 90/60   Pulse (!) 56   Ht 5' 4" (1.626 m)   Wt 68.1 kg (150 lb 2.1 oz)   BMI 25.77 kg/m²     Physical Exam:  Physical Exam  Constitutional:       General: She is not in acute distress.     Appearance: Normal appearance. She is not ill-appearing or diaphoretic.      Comments: Well-appearing woman in NAD.   HENT:      Head: Normocephalic and atraumatic.      Nose: Nose normal.      Mouth/Throat:      Mouth: Mucous membranes are moist.      Pharynx: Oropharynx is clear.   Eyes:      Pupils: Pupils are equal, round, and reactive to light. "   Cardiovascular:      Rate and Rhythm: Normal rate and regular rhythm.      Pulses: Normal pulses.      Heart sounds: Normal heart sounds. No murmur heard.     No friction rub. No gallop.   Pulmonary:      Effort: Pulmonary effort is normal. No respiratory distress.      Breath sounds: Normal breath sounds. No wheezing, rhonchi or rales.   Chest:      Chest wall: No tenderness.   Abdominal:      General: There is no distension.      Palpations: Abdomen is soft.      Tenderness: There is no abdominal tenderness.   Musculoskeletal:         General: No swelling or tenderness.      Cervical back: Normal range of motion.      Right lower leg: No edema.      Left lower leg: No edema.   Skin:     General: Skin is warm and dry.      Findings: No erythema, lesion or rash.   Neurological:      General: No focal deficit present.      Mental Status: She is alert and oriented to person, place, and time. Mental status is at baseline.      Motor: No weakness.      Gait: Gait normal.   Psychiatric:         Mood and Affect: Mood normal.         Behavior: Behavior normal.        Laboratory Data:  Lab Results   Component Value Date    WBC 6.33 03/13/2024    HGB 13.7 03/13/2024    HCT 43.0 03/13/2024    MCV 91 03/13/2024     03/13/2024     Lab Results   Component Value Date    GLU 87 03/13/2024     03/13/2024    K 3.9 03/13/2024     03/13/2024    CO2 25 03/13/2024    BUN 16 03/13/2024    CREATININE 0.8 03/13/2024    CALCIUM 9.6 03/13/2024    MG 2.0 07/30/2022     Lab Results   Component Value Date    INR 1.0 03/13/2024    INR 1.0 02/08/2024    INR 1.0 09/20/2022       Pertinent Cardiac Data:  ECG: Sinus bradycardia with rate of 56 bpm,  ms, QRS 74 ms, QT/QTc 426/411 ms, low amplitude limb leads.     Resting 2D Transthoracic Echocardiogram - 4/23/2022:  The left ventricle is normal in size with eccentric hypertrophy and normal systolic function. The estimated ejection fraction is 55%.  Normal right ventricular  size with normal right ventricular systolic function.  Mild left atrial enlargement.  Normal central venous pressure (3 mmHg).  Atrial fibrillation present throughout the examination.     EPS with RFA PVI - 10/5/2022:    Successful radiofrequency ablation with pulmonary vein isolation (PVI).    3D mapping performed with Carto 3.    Electrophysiology study with coronary sinus pacing.    His bundle recording.    Intracardiac echo.    48-Hour Holter Monitor - 2/22/2023:  Baseline rhythm was sinus bradycardia with normal intervals and an average heart rate of 58 bpm.  There were no reported symptoms.  There were very rare PVCs with an overall PVC burden of 0%. There were very rare PACs with an overall PAC burden of 0%.  There were no atrial or ventricular arrhythmias. There was no evidence of recurrent atrial fibrillation.    ROWDY - 3/21/2024:    ROWDY for intra-procedural guidance during implantation of Watchman device.    s/p implantation of 24 mm Watchman FLX. The device is well-seated with adequate compression. There is no hina-device leak. There is a small residual iatrogenic atrial septal defect (ASD) after completion of the procedure with left to right shunt. No pericardial effusion was seen throughout or following the procedure.    Left Atrium: Left atrium is dilated. The left atrial appendage appears normal. The left atrial appendage has a cauliflower morphology. Appendage velocity is normal at greater than 40 cm/sec. The pulmonary veins have systolic blunting. There is no thrombus in the cavity or WHITNEY.    Left Ventricle: There is normal systolic function with a visually estimated ejection fraction of 60 - 65%.    Right Ventricle: Normal right ventricular cavity size. Wall thickness is normal. Right ventricle wall motion  is normal. Systolic function is normal.    Aorta: Grade 2 atherosclerosis of the descending aorta.    WATCHMAN Implantation - 3/21/2024:    The estimated blood loss was none.    The left atrial  appendage closure was successful with a 24 mm WATCHMAN FLX device.    ROWDY - 5/9/2024:    ROWDY performed six weeks after 24 mm Watchman FLX implantation.    Left Ventricle: The left ventricle is normal in size. Normal wall thickness. There is normal systolic function with a visually estimated ejection fraction of 60 - 65%.    Right Ventricle: Normal right ventricular cavity size. Wall thickness is normal. Systolic function is normal.    Left Atrium: Left atrium is dilated. There is a closure device within the appendage. The device is a Watchman. There is complete occlusion with no residual leaks. There is no thrombus in the left atrial cavity.      ASSESSMENT & PLAN:   Ms. Maria Dolores Tamayo is a alisa 54-year-old woman who returns to clinic today for ongoing evaluation and recommendations regarding paroxysmal atrial fibrillation, s/p successful radiofrequency catheter ablation with pulmonary vein isolation on 10/5/2022 and successful implantation of a WATCHMAN left atrial appendage occlusion device on 3/21/2024. 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 She was previously admitted from 4/22-25/2022 from her rehabilitation center with several alerts from her LifeVest, consistent with multiple events of atrial fibrillation with RVR. She returns to clinic today for ongoing evaluation following her successful radiofrequency catheter ablation with pulmonary vein isolation and for ongoing evaluation after completion of her post-implantation WATCHMAN left atrial appendage  occlusion device.     - We continue to discuss the pathophysiology of atrial fibrillation; specifically, we discussed paroxysmal atrial fibrillation and the concept that some patients may experience paroxysms of atrial fibrillation interrupting periods of sinus rhythm. We discussed that the biggest risk associated with atrial fibrillation is an increased risk of CVA.  - Following her successful pulmonary vein isolation procedure she denies any subsequent episodes of sustained atrial fibrillation on both symptom review and on her Apple watch recordings. We reviewed the results of her subsequent 48-hour Holter monitor that did not reveal any events of recurrent atrial fibrillation.  - Immediately following implantation of her WATCHMAN she reported brief events of palpitations, but feels that these have continued to decrease following recovery from her procedure.   - She has been successfully discontinued from amiodarone 200mg po daily with maintenance of sinus rhythm. She previously reported vivid dreams and increased fatigue on this agent, and as she is now beyond the blanking period following her successful PVI, we have discontinued this agent.  - She has been discontinued from her lisinopril therapy as her blood pressures remain at goal off of any antihypertensive agent.    - Her [id number] is 3 (prior CVA, female gender, age less than 64), portending an annual adjusted risk of CVA of 3.2%. She was previously maintained on uninterrupted apixaban 2.5mg po BID. This dose was reduced from her previous 5mg po BID secondary to significant uterine bleeding.   - She underwent a successful WATCHMAN left atrial appendage occlusion implantation on 3/21/2024 with a well-seated device with no evidence of peridevice leak noted on her recent ROWDY on 5/9/2024. She has been discontinued from her apixaban and transitioned to DAPT with aspirin and clopidogrel. Unfortunately, she has been having increased bruising on the  "clopidogrel. We will discontinue this agent at this time and continue aspirin monotherapy.   - She has recovery of her systolic function, LVEF 60-65%, with no recorded VT. My suspicion was that her prior episode of reported "wide complex tachycardia" was actually aberrant atrial fibrillation with RVR at OSH rather than true VT - this event occurred in a CT scanner with no tracings available to confirm. We discussed obtaining a cardiac MRI following her ablation for complete assessment of LGE for risk stratification for potential VT; however, she has not had any subsequent palpitations, and she would prefer to defer this imaging at this time.   - Possible underlying drivers of atrial fibrillation were addressed at this appointment, including recommendations for maintenance of a healthy BMI - now a class I recommendation. Review of laboratory data revealed acceptable TSH at 1.863. She denies any loud snoring or excessive daytime sleepiness.    - She will continue to follow with her PCP and neurology for ongoing management of her comorbid conditions.     This patient will return to clinic with me in four months for ongoing evaluation following her successful radiofrequency catheter ablation with pulmonary vein isolation and implantation of her WATCHMAN left atrial appendage occlusion device. She will remain on aspirin monotherapy. All questions and concerns were addressed at this encounter.     Signing Physician:       DEZ Cervantes MD  Electrophysiology Attending  "

## 2024-05-21 ENCOUNTER — PATIENT MESSAGE (OUTPATIENT)
Dept: ADMINISTRATIVE | Facility: HOSPITAL | Age: 54
End: 2024-05-21
Payer: COMMERCIAL

## 2024-05-30 ENCOUNTER — HOSPITAL ENCOUNTER (OUTPATIENT)
Dept: RADIOLOGY | Facility: OTHER | Age: 54
Discharge: HOME | End: 2024-05-30
Attending: OBSTETRICS & GYNECOLOGY
Payer: COMMERCIAL

## 2024-05-30 DIAGNOSIS — Z12.31 ENCOUNTER FOR SCREENING MAMMOGRAM FOR MALIGNANT NEOPLASM OF BREAST: ICD-10-CM

## 2024-05-30 PROCEDURE — 77063 BREAST TOMOSYNTHESIS BI: CPT | Mod: 26,,, | Performed by: RADIOLOGY

## 2024-05-30 PROCEDURE — 77067 SCR MAMMO BI INCL CAD: CPT | Mod: 26,,, | Performed by: RADIOLOGY

## 2024-05-30 PROCEDURE — 77063 BREAST TOMOSYNTHESIS BI: CPT | Mod: TC

## 2024-07-08 ENCOUNTER — TELEPHONE (OUTPATIENT)
Dept: ELECTROPHYSIOLOGY | Facility: CLINIC | Age: 54
End: 2024-07-08
Payer: COMMERCIAL

## 2024-08-26 ENCOUNTER — OFFICE VISIT (OUTPATIENT)
Dept: GYNECOLOGIC ONCOLOGY | Facility: CLINIC | Age: 54
End: 2024-08-26
Payer: COMMERCIAL

## 2024-08-26 VITALS
HEART RATE: 73 BPM | SYSTOLIC BLOOD PRESSURE: 128 MMHG | DIASTOLIC BLOOD PRESSURE: 63 MMHG | HEIGHT: 65 IN | BODY MASS INDEX: 24.02 KG/M2 | WEIGHT: 144.19 LBS

## 2024-08-26 DIAGNOSIS — N93.8 DYSFUNCTIONAL UTERINE BLEEDING: Primary | ICD-10-CM

## 2024-08-26 PROCEDURE — 3078F DIAST BP <80 MM HG: CPT | Mod: CPTII,S$GLB,, | Performed by: OBSTETRICS & GYNECOLOGY

## 2024-08-26 PROCEDURE — 3008F BODY MASS INDEX DOCD: CPT | Mod: CPTII,S$GLB,, | Performed by: OBSTETRICS & GYNECOLOGY

## 2024-08-26 PROCEDURE — 1159F MED LIST DOCD IN RCRD: CPT | Mod: CPTII,S$GLB,, | Performed by: OBSTETRICS & GYNECOLOGY

## 2024-08-26 PROCEDURE — 3074F SYST BP LT 130 MM HG: CPT | Mod: CPTII,S$GLB,, | Performed by: OBSTETRICS & GYNECOLOGY

## 2024-08-26 PROCEDURE — 99999 PR PBB SHADOW E&M-EST. PATIENT-LVL III: CPT | Mod: PBBFAC,,, | Performed by: OBSTETRICS & GYNECOLOGY

## 2024-08-26 PROCEDURE — 1160F RVW MEDS BY RX/DR IN RCRD: CPT | Mod: CPTII,S$GLB,, | Performed by: OBSTETRICS & GYNECOLOGY

## 2024-08-26 PROCEDURE — 99214 OFFICE O/P EST MOD 30 MIN: CPT | Mod: S$GLB,,, | Performed by: OBSTETRICS & GYNECOLOGY

## 2024-08-26 NOTE — PROGRESS NOTES
Subjective:      Patient ID: Maria Dolores Tamayo is a 54 y.o. female.    Chief Complaint: No chief complaint on file.        HPI  Here today for f/u of DUB and fibroids.  No bleeding. Now only takes ASA daily.  Had Watchmen placed and has been doing great.  Review of Systems   Constitutional:  Negative for activity change, appetite change, chills, fatigue and fever.   HENT:  Negative for hearing loss, mouth sores, nosebleeds, sore throat and tinnitus.    Eyes:  Negative for visual disturbance.   Respiratory:  Negative for cough, chest tightness, shortness of breath and wheezing.    Cardiovascular:  Negative for chest pain and leg swelling.   Gastrointestinal:  Negative for abdominal distention, abdominal pain, blood in stool, constipation, diarrhea, nausea and vomiting.   Genitourinary:  Negative for dysuria, flank pain, frequency, hematuria, pelvic pain, vaginal bleeding, vaginal discharge and vaginal pain.   Musculoskeletal:  Negative for arthralgias and back pain.   Skin:  Negative for rash.   Neurological:  Negative for dizziness, seizures, syncope, weakness and numbness.   Hematological:  Does not bruise/bleed easily.   Psychiatric/Behavioral:  Negative for confusion and sleep disturbance. The patient is not nervous/anxious.        Objective:   Physical Exam:   Constitutional: She is oriented to person, place, and time. She appears well-developed and well-nourished. No distress.    HENT:   Head: Normocephalic and atraumatic.    Eyes: No scleral icterus.     Cardiovascular:       Exam reveals no cyanosis and no edema.        Pulmonary/Chest: Effort normal. No respiratory distress.        Abdominal: Soft. She exhibits no distension and no fluid wave. There is no abdominal tenderness. There is no rigidity.     Genitourinary:    Uterus normal.      Pelvic exam was performed with patient supine.   There is no rash, tenderness or lesion on the right labia. There is no rash, tenderness or lesion on the left labia.  Cervix is normal. Right adnexum displays no mass, no tenderness and no fullness. Left adnexum displays no mass, no tenderness and no fullness. There is bleeding (scant w discharge) in the vagina. No vaginal discharge in the vagina. Uterus is not enlarged, not fixed, not tender and not hosting fibroids.           Musculoskeletal: Normal range of motion and moves all extremeties. No edema.       Neurological: She is alert and oriented to person, place, and time.    Skin: Skin is warm. No rash noted. No cyanosis or erythema.    Psychiatric: She has a normal mood and affect. Thought content normal.       Assessment:     1. Dysfunctional uterine bleeding          Plan:       Doing well overall.  No indication for surgery at this time.  Will call if bleeding returns or anything changes.  RTC prn.

## 2024-10-10 ENCOUNTER — OFFICE VISIT (OUTPATIENT)
Dept: ELECTROPHYSIOLOGY | Facility: CLINIC | Age: 54
End: 2024-10-10
Payer: COMMERCIAL

## 2024-10-10 ENCOUNTER — HOSPITAL ENCOUNTER (OUTPATIENT)
Dept: CARDIOLOGY | Facility: CLINIC | Age: 54
Discharge: HOME | End: 2024-10-10
Payer: COMMERCIAL

## 2024-10-10 VITALS
HEART RATE: 62 BPM | SYSTOLIC BLOOD PRESSURE: 118 MMHG | WEIGHT: 144.19 LBS | DIASTOLIC BLOOD PRESSURE: 66 MMHG | BODY MASS INDEX: 24.02 KG/M2 | HEIGHT: 65 IN

## 2024-10-10 DIAGNOSIS — I42.8 NONISCHEMIC CARDIOMYOPATHY: ICD-10-CM

## 2024-10-10 DIAGNOSIS — D25.9 UTERINE LEIOMYOMA, UNSPECIFIED LOCATION: ICD-10-CM

## 2024-10-10 DIAGNOSIS — I48.0 PAROXYSMAL ATRIAL FIBRILLATION: Primary | ICD-10-CM

## 2024-10-10 DIAGNOSIS — Z86.73 HISTORY OF CEREBROVASCULAR ACCIDENT (CVA) FROM LEFT CAROTID ARTERY OCCLUSION INVOLVING LEFT MIDDLE CEREBRAL ARTERY TERRITORY: ICD-10-CM

## 2024-10-10 DIAGNOSIS — I63.40 CEREBROVASCULAR ACCIDENT (CVA) DUE TO EMBOLISM OF CEREBRAL ARTERY: ICD-10-CM

## 2024-10-10 DIAGNOSIS — K57.92 DIVERTICULITIS: ICD-10-CM

## 2024-10-10 DIAGNOSIS — N93.8 DYSFUNCTIONAL UTERINE BLEEDING: ICD-10-CM

## 2024-10-10 DIAGNOSIS — Z87.42 HISTORY OF ABNORMAL UTERINE BLEEDING: ICD-10-CM

## 2024-10-10 DIAGNOSIS — Z98.890 H/O CARDIAC RADIOFREQUENCY ABLATION: ICD-10-CM

## 2024-10-10 DIAGNOSIS — Z86.79 HX OF MYOCARDITIS: ICD-10-CM

## 2024-10-10 DIAGNOSIS — I48.91 ATRIAL FIBRILLATION WITH RVR: ICD-10-CM

## 2024-10-10 DIAGNOSIS — Z95.818 PRESENCE OF WATCHMAN LEFT ATRIAL APPENDAGE CLOSURE DEVICE: ICD-10-CM

## 2024-10-10 DIAGNOSIS — I50.32 HEART FAILURE WITH RECOVERED EJECTION FRACTION (HFRECEF): ICD-10-CM

## 2024-10-10 LAB
OHS QRS DURATION: 74 MS
OHS QTC CALCULATION: 401 MS

## 2024-10-10 PROCEDURE — 3074F SYST BP LT 130 MM HG: CPT | Mod: CPTII,S$GLB,, | Performed by: STUDENT IN AN ORGANIZED HEALTH CARE EDUCATION/TRAINING PROGRAM

## 2024-10-10 PROCEDURE — 3008F BODY MASS INDEX DOCD: CPT | Mod: CPTII,S$GLB,, | Performed by: STUDENT IN AN ORGANIZED HEALTH CARE EDUCATION/TRAINING PROGRAM

## 2024-10-10 PROCEDURE — 3078F DIAST BP <80 MM HG: CPT | Mod: CPTII,S$GLB,, | Performed by: STUDENT IN AN ORGANIZED HEALTH CARE EDUCATION/TRAINING PROGRAM

## 2024-10-10 PROCEDURE — 93010 ELECTROCARDIOGRAM REPORT: CPT | Mod: S$GLB,,, | Performed by: INTERNAL MEDICINE

## 2024-10-10 PROCEDURE — 99214 OFFICE O/P EST MOD 30 MIN: CPT | Mod: S$GLB,,, | Performed by: STUDENT IN AN ORGANIZED HEALTH CARE EDUCATION/TRAINING PROGRAM

## 2024-10-10 PROCEDURE — 99999 PR PBB SHADOW E&M-EST. PATIENT-LVL III: CPT | Mod: PBBFAC,,, | Performed by: STUDENT IN AN ORGANIZED HEALTH CARE EDUCATION/TRAINING PROGRAM

## 2024-10-10 PROCEDURE — 93005 ELECTROCARDIOGRAM TRACING: CPT | Mod: S$GLB,,, | Performed by: STUDENT IN AN ORGANIZED HEALTH CARE EDUCATION/TRAINING PROGRAM

## 2024-10-10 RX ORDER — VENLAFAXINE HYDROCHLORIDE 37.5 MG/1
37.5 CAPSULE, EXTENDED RELEASE ORAL
COMMUNITY
Start: 2024-08-08

## 2024-10-10 NOTE — PROGRESS NOTES
Electrophysiology Clinic Note    Reason for follow-up patient visit: Ongoing evaluation and recommendations regarding paroxysmal atrial fibrillation, s/p successful radiofrequency catheter ablation with pulmonary vein isolation on 10/5/2022 and successful implantation of a WATCHMAN left atrial appendage occlusion device on 3/21/2024.     PRESENTING HISTORY:     History of Present Illness:  Ms. Maria Dolores Tamayo is a alisa 54-year-old woman who returns to clinic today for ongoing evaluation and recommendations regarding paroxysmal atrial fibrillation, s/p successful radiofrequency catheter ablation with pulmonary vein isolation on 10/5/2022 and successful implantation of a WATCHMAN left atrial appendage occlusion device on 3/21/2024. 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 She was previously admitted from 4/22-25/2022 from her rehabilitation center with several alerts from her LifeVest, consistent with multiple events of atrial fibrillation with RVR. She returns to clinic today for ongoing evaluation following her successful radiofrequency catheter ablation with pulmonary vein isolation and for ongoing evaluation after completion of her post-implantation WATCHMAN left atrial appendage occlusion device.     In discussion with Ms. Tamayo, she tells me that she is feeling overall quite well and has recovered fully following her WATCHMAN left atrial appendage occlusion device. She feels that she has returned to her previous baseline level of health with the exception  of mild word-searching difficulty following her prior stroke. She denies any recurrent symptoms of sustained palpitations following her ablation; however, immediately after her WATCHMAN implantation she reported events of brief palpitations. She was previously maintained on amiodarone 200mg po daily, but following her successful PVI, we obtained an ambulatory event monitor after the blanking period revealing no recurrent atrial fibrillation, and this antiarrhythmic agent was successfully discontinued. She has a [employer] mobile andrea and denies any recurrent episodes of sustained atrial fibrillation, and denies any symptoms of racing heart rates or recurrent palpitations since the immediate period following her WATCHMAN implantation. While previously on amiodarone therapy, she reported symptoms of vivid dreams and fatigue on this medication. Since discontinuation these side effects have entirely resolved. She has had recovery of her systolic function, with most recent LVEF 60-65% with a well-seated WATCHMAN device with no evidence of peridevice leak. She has been vigilant to wear her Apple watch, and denies any further episodes of atrial fibrillation on her Apple watch. She was safely discontinued from her apixaban 2.5mg po BID following confirmation of a well-seated WATCHMAN and has been transitioned to DAPT with aspirin and clopidogrel therapy. She has completed six months of this DAPT, and is now maintained on aspirin monotherapy. She denies any events of adverse bleeding and no further significant bruising since cessation of her apixaban and clopidogrel. She remains in sinus rhythm on all subsequent ECGs, with no evidence of prolonged QT, nonsustained or sustained VT. Review of her venogram obtained in [place] at OSH reveals collateralization of the left subclavian vein. I called her outside hospital in an effort to obtain strips of her prior episode of WCT. Unfortunately, this event occurred in the CT scanner and no  strips or ECGs were recorded. Currently, she has no indication for ICD implantation. We previously discussed obtaining a cardiac MRI in order to further assess for possible VT substrate in the future; however, as she has not had any subsequent events of arrhythmia following her successful ablation, we will continue to monitor at this time.     She denies any episodes of dizziness, lightheadedness, syncope/presyncope, chest pain or chest discomfort, recurrent sustained palpitations, nausea or vomiting, orthopnea, lower extremity edema, or PND. She reports baseline mild shortness of breath and dyspnea with exertion that she feels has remained stable for her. She reports that she remains on Mounjaro and has been making excellent progress with weight loss. She can climb more than one flight of stairs prior to needing to take a break and remains very physically active without reported limitation. Her primary complaint today is the increased bruising while maintained on clopidogrel.     Review of Systems:  Review of Systems   Constitutional:  Negative for activity change.   HENT:  Negative for nasal congestion, nosebleeds, postnasal drip, rhinorrhea, sinus pressure/congestion, sneezing and sore throat.    Respiratory:  Positive for shortness of breath. Negative for apnea, cough, chest tightness and wheezing.    Cardiovascular:  Positive for palpitations. Negative for chest pain, leg swelling and claudication.        Brief palpitations immediately following her WATCHMAN implantation.    Gastrointestinal:  Negative for abdominal distention, abdominal pain, blood in stool, change in bowel habit, constipation, diarrhea, nausea and vomiting.   Genitourinary:  Negative for dysuria and hematuria.   Musculoskeletal:  Negative for gait problem.   Neurological:  Positive for speech difficulty. Negative for dizziness, seizures, syncope, weakness, light-headedness, headaches and coordination difficulties.        Mild word-searching  difficulty following her prior stroke.         PAST HISTORY:     Past Medical History:   Diagnosis Date    Abnormal Pap smear of cervix     Anticoagulant long-term use     CHF (congestive heart failure)     Coronary artery disease     Encounter for blood transfusion     Heart murmur     atrial fibrillation    Myocardial infarction     Stroke        Past Surgical History:   Procedure Laterality Date    ABLATION OF ARRHYTHMOGENIC FOCUS FOR ATRIAL FIBRILLATION N/A 10/05/2022    Procedure: Ablation atrial fibrillation;  Surgeon: PLACIDO Cervantes MD;  Location: [facility] EP LAB;  Service: Cardiology;  Laterality: N/A;  AF, ROWDY, PVI, RFA, Carto, Gen, EH, 3 Prep    AUGMENTATION OF BREAST      COSMETIC SURGERY      ECHOCARDIOGRAM,TRANSESOPHAGEAL N/A 3/21/2024    Procedure: Transesophageal echo (ROWDY) intra-procedure log documentation;  Surgeon: Shane Kothari III, MD;  Location: [facility] EP LAB;  Service: Cardiology;  Laterality: N/A;  AF, ROWDY/Watchman, BosSci, Gen, EH, 3prep    ECHOCARDIOGRAM,TRANSESOPHAGEAL N/A 5/9/2024    Procedure: Transesophageal echo (ROWDY) intra-procedure log documentation;  Surgeon: Provider, [facility];  Location: [facility] EP LAB;  Service: Cardiology;  Laterality: N/A;  AF/6 wk post Watchman, ROWDY, MAC. EH, 3prep    heart abasian  01/13/2023    OCCLUSION OF LEFT ATRIAL APPENDAGE N/A 3/21/2024    Procedure: Left atrial appendage occlusion;  Surgeon: PLACIDO Cervantes MD;  Location: [facility] EP LAB;  Service: Cardiology;  Laterality: N/A;  AF, ROWDY/Watchman, BosSci, Gen, EH, 3prep    UTERINE ARTERY EMBOLIZATION  2000       Family History:  Family History   Problem Relation Name Age of Onset    Progressive Supranuclear Palsy Mother 78 in 2023 73    Arthritis Father 79     Cancer Maternal Aunt mom 1/9         ovarian    Ovarian cancer Maternal Aunt mom 1/9 60    Diabetes Paternal Grandmother          prediabetic    Breast cancer Neg Hx      Colon cancer Neg Hx      Uterine cancer Neg Hx         Social  "History:  She  reports that she has never smoked. She has never used smokeless tobacco. She reports that she does not currently use alcohol. She reports that she does not use drugs.      MEDICATIONS & ALLERGIES:     Review of patient's allergies indicates:  No Known Allergies    Current Outpatient Medications on File Prior to Visit   Medication Sig Dispense Refill    ASHWAGANDHA ROOT EXTRACT ORAL Take by mouth.      aspirin (ECOTRIN) 81 MG EC tablet Take 1 tablet (81 mg total) by mouth once daily.      ergocalciferol, vitamin D2, (VITAMIN D ORAL) Take by mouth.      furosemide (LASIX) 20 MG tablet Take 1 tablet (20 mg total) by mouth daily as needed. edema 30 tablet 0    magnesium chloride (MAG 64 ORAL) Take by mouth.      MOUNJARO 10 mg/0.5 mL PnIj SMARTSIG:10 Milligram(s) SUB-Q Once a Week      progesterone (PROMETRIUM) 200 MG capsule Take 200 mg by mouth every evening. at bedtime      UNABLE TO FIND Nutrascripts pregenolone sr 100mg      UNABLE TO FIND Nutrascripts DHEA Sr 5 mg       Current Facility-Administered Medications on File Prior to Visit   Medication Dose Route Frequency Provider Last Rate Last Admin    0.9%  NaCl infusion (for blood administration)   Intravenous Q24H Manju Aguayo NP            OBJECTIVE:     Vital Signs:  /66   Pulse 62   Ht 5' 5" (1.651 m)   Wt 65.4 kg (144 lb 2.9 oz)   BMI 23.99 kg/m²     Physical Exam:  Physical Exam  Constitutional:       General: She is not in acute distress.     Appearance: Normal appearance. She is not ill-appearing or diaphoretic.      Comments: Well-appearing woman in NAD.   HENT:      Head: Normocephalic and atraumatic.      Nose: Nose normal.      Mouth/Throat:      Mouth: Mucous membranes are moist.      Pharynx: Oropharynx is clear.   Eyes:      Pupils: Pupils are equal, round, and reactive to light.   Cardiovascular:      Rate and Rhythm: Normal rate and regular rhythm.      Pulses: Normal pulses.      Heart sounds: Normal heart " sounds. No murmur heard.     No friction rub. No gallop.   Pulmonary:      Effort: Pulmonary effort is normal. No respiratory distress.      Breath sounds: Normal breath sounds. No wheezing, rhonchi or rales.   Chest:      Chest wall: No tenderness.   Abdominal:      General: There is no distension.      Palpations: Abdomen is soft.      Tenderness: There is no abdominal tenderness.   Musculoskeletal:         General: No swelling or tenderness.      Cervical back: Normal range of motion.      Right lower leg: No edema.      Left lower leg: No edema.   Skin:     General: Skin is warm and dry.      Findings: No erythema, lesion or rash.   Neurological:      General: No focal deficit present.      Mental Status: She is alert and oriented to person, place, and time. Mental status is at baseline.      Motor: No weakness.      Gait: Gait normal.   Psychiatric:         Mood and Affect: Mood normal.         Behavior: Behavior normal.        Laboratory Data:  Lab Results   Component Value Date    WBC 6.33 03/13/2024    HGB 13.7 03/13/2024    HCT 43.0 03/13/2024    MCV 91 03/13/2024     03/13/2024     Lab Results   Component Value Date    GLU 87 03/13/2024     03/13/2024    K 3.9 03/13/2024     03/13/2024    CO2 25 03/13/2024    BUN 16 03/13/2024    CREATININE 0.8 03/13/2024    CALCIUM 9.6 03/13/2024    MG 2.0 07/30/2022     Lab Results   Component Value Date    INR 1.0 03/13/2024    INR 1.0 02/08/2024    INR 1.0 09/20/2022       Pertinent Cardiac Data:  Personal interpretation of today's ECG: Normal sinus rhythm with rate of 62 bpm,  ms, QRS 74 ms, QT/QTc 396/401 ms, low amplitude limb leads.     Resting 2D Transthoracic Echocardiogram - 4/23/2022:  The left ventricle is normal in size with eccentric hypertrophy and normal systolic function. The estimated ejection fraction is 55%.  Normal right ventricular size with normal right ventricular systolic function.  Mild left atrial enlargement.  Normal  central venous pressure (3 mmHg).  Atrial fibrillation present throughout the examination.     EPS with RFA PVI - 10/5/2022:    Successful radiofrequency ablation with pulmonary vein isolation (PVI).    3D mapping performed with Carto 3.    Electrophysiology study with coronary sinus pacing.    His bundle recording.    Intracardiac echo.    48-Hour Holter Monitor - 2/22/2023:  Baseline rhythm was sinus bradycardia with normal intervals and an average heart rate of 58 bpm.  There were no reported symptoms.  There were very rare PVCs with an overall PVC burden of 0%. There were very rare PACs with an overall PAC burden of 0%.  There were no atrial or ventricular arrhythmias. There was no evidence of recurrent atrial fibrillation.    ROWDY - 3/21/2024:    ROWDY for intra-procedural guidance during implantation of Watchman device.    s/p implantation of 24 mm Watchman FLX. The device is well-seated with adequate compression. There is no hian-device leak. There is a small residual iatrogenic atrial septal defect (ASD) after completion of the procedure with left to right shunt. No pericardial effusion was seen throughout or following the procedure.    Left Atrium: Left atrium is dilated. The left atrial appendage appears normal. The left atrial appendage has a cauliflower morphology. Appendage velocity is normal at greater than 40 cm/sec. The pulmonary veins have systolic blunting. There is no thrombus in the cavity or WHITNEY.    Left Ventricle: There is normal systolic function with a visually estimated ejection fraction of 60 - 65%.    Right Ventricle: Normal right ventricular cavity size. Wall thickness is normal. Right ventricle wall motion  is normal. Systolic function is normal.    Aorta: Grade 2 atherosclerosis of the descending aorta.    WATCHMAN Implantation - 3/21/2024:    The estimated blood loss was none.    The left atrial appendage closure was successful with a 24 mm WATCHMAN FLX device.    ROWDY - 5/9/2024:    ROWDY  performed six weeks after 24 mm Watchman FLX implantation.    Left Ventricle: The left ventricle is normal in size. Normal wall thickness. There is normal systolic function with a visually estimated ejection fraction of 60 - 65%.    Right Ventricle: Normal right ventricular cavity size. Wall thickness is normal. Systolic function is normal.    Left Atrium: Left atrium is dilated. There is a closure device within the appendage. The device is a Watchman. There is complete occlusion with no residual leaks. There is no thrombus in the left atrial cavity.      ASSESSMENT & PLAN:   Ms. Maria Dolores Tamayo is a alisa 54-year-old woman who returns to clinic today for ongoing evaluation and recommendations regarding paroxysmal atrial fibrillation, s/p successful radiofrequency catheter ablation with pulmonary vein isolation on 10/5/2022 and successful implantation of a WATCHMAN left atrial appendage occlusion device on 3/21/2024. 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 She was previously admitted from 4/22-25/2022 from her rehabilitation center with several alerts from her LifeVest, consistent with multiple events of atrial fibrillation with RVR. She returns to clinic today for ongoing evaluation following her successful radiofrequency catheter ablation with pulmonary vein isolation and for ongoing evaluation after completion of her post-implantation WATCHMAN left atrial appendage occlusion device.     - We continue to discuss the pathophysiology of atrial fibrillation;  specifically, we discussed paroxysmal atrial fibrillation and the concept that some patients may experience paroxysms of atrial fibrillation interrupting periods of sinus rhythm. We discussed that the biggest risk associated with atrial fibrillation is an increased risk of CVA.  - Following her successful pulmonary vein isolation procedure she denies any subsequent episodes of sustained atrial fibrillation on both symptom review and on her Apple watch recordings. We reviewed the results of her subsequent 48-hour Holter monitor that did not reveal any events of recurrent atrial fibrillation.  - Immediately following implantation of her WATCHMAN she reported brief events of palpitations, but feels that these have resolved following recovery from her procedure.   - She has been successfully discontinued from amiodarone 200mg po daily with maintenance of sinus rhythm. She previously reported vivid dreams and increased fatigue on this agent, and as she is now beyond the blanking period following her successful PVI, we have discontinued this agent.  - She has been discontinued from her lisinopril therapy as her blood pressures remain at goal off of any antihypertensive agent.    - Her [id number] is 3 (prior CVA, female gender, age less than 64), portending an annual adjusted risk of CVA of 3.2%. She was previously maintained on uninterrupted apixaban 2.5mg po BID. This dose was reduced from her previous 5mg po BID secondary to significant uterine bleeding.   - She underwent a successful WATCHMAN left atrial appendage occlusion implantation on 3/21/2024 with a well-seated device with no evidence of peridevice leak noted on her recent ROWDY on 5/9/2024. She was discontinued from her apixaban and transitioned to DAPT with aspirin and clopidogrel. Unfortunately, she reported increased bruising on the clopidogrel, and was discontinued from clopidogrel and is currently maintained on aspirin monotherapy.    - She has recovery of  "her systolic function, LVEF 60-65%, with no recorded VT. My suspicion was that her prior episode of reported "wide complex tachycardia" was actually aberrant atrial fibrillation with RVR at OSH rather than true VT - this event occurred in a CT scanner with no tracings available to confirm. We discussed obtaining a cardiac MRI following her ablation for complete assessment of LGE for risk stratification for potential VT; however, she has not had any subsequent palpitations, and she would prefer to defer this imaging at this time.   - Possible underlying drivers of atrial fibrillation were addressed at this appointment, including recommendations for maintenance of a healthy BMI - now a class I recommendation. Review of laboratory data revealed acceptable TSH at 1.863. She denies any loud snoring or excessive daytime sleepiness.    - She will continue to follow with her PCP and neurology for ongoing management of her comorbid conditions.     This patient will return to clinic with me in one year for ongoing evaluation following her successful radiofrequency catheter ablation with pulmonary vein isolation and successful implantation of her WATCHMAN left atrial appendage occlusion device. She will remain on aspirin monotherapy. All questions and concerns were addressed at this encounter. Total time spent in this patient encounter: 34 minutes.     Signing Physician:       DEZ Cervantes MD  Electrophysiology Attending    "

## 2024-10-14 ENCOUNTER — OFFICE VISIT (OUTPATIENT)
Dept: OBSTETRICS AND GYNECOLOGY | Facility: CLINIC | Age: 54
End: 2024-10-14
Attending: OBSTETRICS & GYNECOLOGY
Payer: COMMERCIAL

## 2024-10-14 VITALS
WEIGHT: 142.81 LBS | HEIGHT: 65 IN | BODY MASS INDEX: 23.79 KG/M2 | DIASTOLIC BLOOD PRESSURE: 72 MMHG | HEART RATE: 72 BPM | SYSTOLIC BLOOD PRESSURE: 109 MMHG

## 2024-10-14 DIAGNOSIS — N95.1 MENOPAUSAL SYMPTOM: ICD-10-CM

## 2024-10-14 DIAGNOSIS — Z12.31 VISIT FOR SCREENING MAMMOGRAM: ICD-10-CM

## 2024-10-14 DIAGNOSIS — Z12.4 PAP SMEAR FOR CERVICAL CANCER SCREENING: Primary | ICD-10-CM

## 2024-10-14 DIAGNOSIS — Z01.419 ENCOUNTER FOR GYNECOLOGICAL EXAMINATION WITHOUT ABNORMAL FINDING: ICD-10-CM

## 2024-10-14 PROCEDURE — 3078F DIAST BP <80 MM HG: CPT | Mod: CPTII,S$GLB,, | Performed by: OBSTETRICS & GYNECOLOGY

## 2024-10-14 PROCEDURE — 1159F MED LIST DOCD IN RCRD: CPT | Mod: CPTII,S$GLB,, | Performed by: OBSTETRICS & GYNECOLOGY

## 2024-10-14 PROCEDURE — 99396 PREV VISIT EST AGE 40-64: CPT | Mod: S$GLB,,, | Performed by: OBSTETRICS & GYNECOLOGY

## 2024-10-14 PROCEDURE — 99999 PR PBB SHADOW E&M-EST. PATIENT-LVL III: CPT | Mod: PBBFAC,,, | Performed by: OBSTETRICS & GYNECOLOGY

## 2024-10-14 PROCEDURE — 3008F BODY MASS INDEX DOCD: CPT | Mod: CPTII,S$GLB,, | Performed by: OBSTETRICS & GYNECOLOGY

## 2024-10-14 PROCEDURE — 87624 HPV HI-RISK TYP POOLED RSLT: CPT | Performed by: OBSTETRICS & GYNECOLOGY

## 2024-10-14 PROCEDURE — 3074F SYST BP LT 130 MM HG: CPT | Mod: CPTII,S$GLB,, | Performed by: OBSTETRICS & GYNECOLOGY

## 2024-10-14 RX ORDER — PRASTERONE 6.5 MG/1
6.5 INSERT VAGINAL DAILY
Qty: 30 EACH | Refills: 11 | Status: SHIPPED | OUTPATIENT
Start: 2024-10-14

## 2024-10-14 NOTE — PROGRESS NOTES
"SUBJECTIVE:   54 y.o. female   for annual routine Pap and checkup. No LMP recorded. Patient is perimenopausal..  She reports that she is seeing Dr. Crawford and has started Prometrium and testosterone injections. She reports some hair growth. She has lost 40 pounds on Mounjaro.   She feels "hot " at night. This is not new. She is supposed to have a follow up with Dr. Crawford regarding starting estrogen. She has history of CVA. She also has Watchman in place    Her last testosterone injection was on .  She is supposed to have follow up labs     Past Medical History:   Diagnosis Date    Abnormal Pap smear of cervix     Anticoagulant long-term use     CHF (congestive heart failure)     Coronary artery disease     Encounter for blood transfusion     Heart murmur     atrial fibrillation    Myocardial infarction     Stroke      Past Surgical History:   Procedure Laterality Date    ABLATION OF ARRHYTHMOGENIC FOCUS FOR ATRIAL FIBRILLATION N/A 10/05/2022    Procedure: Ablation atrial fibrillation;  Surgeon: PLACIDO Cervantes MD;  Location: [facility];  Service: Cardiology;  Laterality: N/A;  AF, ROWDY, PVI, RFA, Carto, Gen, EH, 3 Prep    AUGMENTATION OF BREAST      COSMETIC SURGERY      ECHOCARDIOGRAM,TRANSESOPHAGEAL N/A 3/21/2024    Procedure: Transesophageal echo (ROWDY) intra-procedure log documentation;  Surgeon: Shane Kothari III, MD;  Location: [facility];  Service: Cardiology;  Laterality: N/A;  AF, ROWDY/Watchman, BosSci, Gen, EH, 3prep    ECHOCARDIOGRAM,TRANSESOPHAGEAL N/A 2024    Procedure: Transesophageal echo (ROWDY) intra-procedure log documentation;  Surgeon: Provider, [facility];  Location: [facility];  Service: Cardiology;  Laterality: N/A;  AF/6 wk post Watchman, ROWDY, MAC. EH, 3prep    heart abasian  2023    OCCLUSION OF LEFT ATRIAL APPENDAGE N/A 3/21/2024    Procedure: Left atrial appendage occlusion;  Surgeon: PLACIDO Cervantes MD;  Location: [facility];  " Service: Cardiology;  Laterality: N/A;  AF, ROWDY/Watchman, BosSci, Gen, EH, 3prep    UTERINE ARTERY EMBOLIZATION       Social History     Socioeconomic History    Marital status:    Tobacco Use    Smoking status: Never    Smokeless tobacco: Never   Substance and Sexual Activity    Alcohol use: Not Currently     Comment: rare    Drug use: No    Sexual activity: Yes     Partners: Male     Birth control/protection: None     Social Drivers of Health     Financial Resource Strain: Low Risk  (10/7/2024)    Overall Financial Resource Strain (CARDIA)     Difficulty of Paying Living Expenses: Not hard at all   Food Insecurity: No Food Insecurity (10/7/2024)    Hunger Vital Sign     Worried About Running Out of Food in the Last Year: Never true     Ran Out of Food in the Last Year: Never true   Transportation Needs: No Transportation Needs (5/10/2024)    PRAPARE - Transportation     Lack of Transportation (Medical): No     Lack of Transportation (Non-Medical): No   Physical Activity: Insufficiently Active (10/7/2024)    Exercise Vital Sign     Days of Exercise per Week: 3 days     Minutes of Exercise per Session: 30 min   Stress: No Stress Concern Present (10/7/2024)    [employer] [place] [employer] - Occupational Stress Questionnaire     Feeling of Stress : Only a little   Housing Stability: Unknown (10/7/2024)    Housing Stability Vital Sign     Unable to Pay for Housing in the Last Year: No     Family History   Problem Relation Name Age of Onset    Progressive Supranuclear Palsy Mother 78 in  73    Arthritis Father 79     Cancer Maternal Aunt mom          ovarian    Ovarian cancer Maternal Aunt mom  60    Diabetes Paternal Grandmother          prediabetic    Breast cancer Neg Hx      Colon cancer Neg Hx      Uterine cancer Neg Hx       OB History    Para Term  AB Living   2 2 1         SAB IAB Ectopic Multiple Live Births                  # Outcome Date GA Lbr Nima/2nd Weight Sex  Type Anes PTL Lv   2 Term            1 Para      Vag-Spont              Current Outpatient Medications   Medication Sig Dispense Refill    ASHWAGANDHA ROOT EXTRACT ORAL Take by mouth.      aspirin (ECOTRIN) 81 MG EC tablet Take 1 tablet (81 mg total) by mouth once daily.      ergocalciferol, vitamin D2, (VITAMIN D ORAL) Take by mouth.      magnesium chloride (MAG 64 ORAL) Take by mouth.      MOUNJARO 10 mg/0.5 mL PnIj SMARTSIG:10 Milligram(s) SUB-Q Once a Week      progesterone (PROMETRIUM) 200 MG capsule Take 200 mg by mouth every evening. at bedtime      UNABLE TO FIND Nutrascripts pregenolone sr 100mg      UNABLE TO FIND Nutrascripts DHEA Sr 5 mg      venlafaxine (EFFEXOR-XR) 37.5 MG 24 hr capsule Take 37.5 mg by mouth.      furosemide (LASIX) 20 MG tablet Take 1 tablet (20 mg total) by mouth daily as needed. edema (Patient not taking: Reported on 10/14/2024) 30 tablet 0    prasterone, dhea, (INTRAROSA) 6.5 mg Inst Place 6.5 mg vaginally once daily. 30 each 11     No current facility-administered medications for this visit.     Facility-Administered Medications Ordered in Other Visits   Medication Dose Route Frequency Provider Last Rate Last Admin    0.9%  NaCl infusion (for blood administration)   Intravenous Q24H Manju Aguayo NP         Allergies: Patient has no known allergies.     The ASCVD Risk score (Lazarus DK, et al., 2019) failed to calculate for the following reasons:    Risk score cannot be calculated because patient has a medical history suggesting prior/existing ASCVD      ROS:  Constitutional: no weight loss, weight gain, fever, fatigue  Eyes:  No vision changes, glasses/contacts  ENT/Mouth: No ulcers, sinus problems, ears ringing, headache  Cardiovascular: No inability to lie flat, chest pain, exercise intolerance, swelling, heart palpitations  Respiratory: No wheezing, coughing blood, shortness of breath, or cough  Gastrointestinal: No diarrhea, bloody stool, nausea/vomiting,  constipation, gas, hemorrhoids  Genitourinary: No blood in urine, painful urination, urgency of urination, frequency of urination, incomplete emptying, incontinence, abnormal bleeding, painful periods, heavy periods, vaginal discharge, vaginal odor, painful intercourse, sexual problems, bleeding after intercourse.  Musculoskeletal: No muscle weakness  Skin/Breast: No painful breasts, nipple discharge, masses, rash, ulcers  Neurological: No passing out, seizures, numbness, headache  Endocrine: No diabetes, hypothyroid, hyperthyroid, hot flashes, hair loss, abnormal hair growth, acne  Psychiatric: No depression, crying  Hematologic: No bruises, bleeding, swollen lymph nodes, anemia.      Physical Exam:   Constitutional: She is oriented to person, place, and time. She appears well-developed and well-nourished.      Neck: No tracheal deviation present. No thyromegaly present.    Cardiovascular:       Exam reveals no edema.        Pulmonary/Chest: Effort normal. She exhibits no mass, no tenderness, no deformity and no retraction. Right breast exhibits no inverted nipple, no mass, no nipple discharge, no skin change, no tenderness, presence, no bleeding and no swelling. Left breast exhibits no inverted nipple, no mass, no nipple discharge, no skin change, no tenderness, presence, no bleeding and no swelling. Breasts are symmetrical.        Abdominal: Soft. She exhibits no distension and no mass. There is no abdominal tenderness. There is no rebound and no guarding. No hernia. Hernia confirmed negative in the left inguinal area.     Genitourinary:    Vagina and uterus normal.   Rectum:      No external hemorrhoid.   There is no rash, tenderness or lesion on the right labia. There is no rash, tenderness or lesion on the left labia. Cervix is normal. No no adexnal prolapse. Right adnexum displays no mass, no tenderness and no fullness. Left adnexum displays no mass, no tenderness and no fullness. No vaginal discharge,  tenderness, bleeding, rectocele, cystocele or prolapse of vaginal walls in the vagina. Cervix exhibits no motion tenderness, no discharge and no friability. Uterus is not deviated.           Musculoskeletal: Normal range of motion and moves all extremeties. No edema.       Neurological: She is alert and oriented to person, place, and time.    Skin: No rash noted. No erythema. No pallor.    Psychiatric: She has a normal mood and affect. Her behavior is normal. Judgment and thought content normal.         ASSESSMENT:   well woman  Menoapuse  PLAN:   mammogram  pap smear  Hormone levels scheduled.   Will schedule follow up after labs are back counseled on risks benefits and safety and efficacy of intravaginal DHEA.  Can start nightly  Message sent to Dr. Jacobson regarding HRT

## 2024-10-17 LAB
HPV HR 12 DNA SPEC QL NAA+PROBE: NEGATIVE
HPV16 AG SPEC QL: POSITIVE
HPV18 DNA SPEC QL NAA+PROBE: NEGATIVE

## 2024-10-22 ENCOUNTER — TELEPHONE (OUTPATIENT)
Dept: OBSTETRICS AND GYNECOLOGY | Facility: CLINIC | Age: 54
End: 2024-10-22
Payer: COMMERCIAL

## 2024-10-22 NOTE — TELEPHONE ENCOUNTER
----- Message from Rachel Abad MD sent at 10/22/2024  4:09 PM CDT -----  Hi! I spoke to her. Can you please put her down for Colpo on Tuesday at 1130am.   Will be after my cases and if they get cancelled I may add patients in before. Not sure yet. She is in town from [place] next week so want to get her in.   Thanks

## 2024-10-29 ENCOUNTER — PROCEDURE VISIT (OUTPATIENT)
Dept: OBSTETRICS AND GYNECOLOGY | Facility: CLINIC | Age: 54
End: 2024-10-29
Attending: OBSTETRICS & GYNECOLOGY
Payer: COMMERCIAL

## 2024-10-29 VITALS
WEIGHT: 147 LBS | HEART RATE: 66 BPM | DIASTOLIC BLOOD PRESSURE: 66 MMHG | SYSTOLIC BLOOD PRESSURE: 111 MMHG | HEIGHT: 65 IN | BODY MASS INDEX: 24.49 KG/M2

## 2024-10-29 DIAGNOSIS — R87.610 ATYPICAL SQUAMOUS CELL CHANGES OF UNDETERMINED SIGNIFICANCE (ASCUS) ON CERVICAL CYTOLOGY WITH POSITIVE HIGH RISK HUMAN PAPILLOMA VIRUS (HPV): Primary | ICD-10-CM

## 2024-10-29 DIAGNOSIS — R87.810 ATYPICAL SQUAMOUS CELL CHANGES OF UNDETERMINED SIGNIFICANCE (ASCUS) ON CERVICAL CYTOLOGY WITH POSITIVE HIGH RISK HUMAN PAPILLOMA VIRUS (HPV): Primary | ICD-10-CM

## 2024-10-29 PROCEDURE — 88305 TISSUE EXAM BY PATHOLOGIST: CPT | Mod: 59 | Performed by: PATHOLOGY

## 2024-10-29 PROCEDURE — 88305 TISSUE EXAM BY PATHOLOGIST: CPT | Mod: 26,,, | Performed by: PATHOLOGY

## 2024-10-29 PROCEDURE — 57454 BX/CURETT OF CERVIX W/SCOPE: CPT | Mod: S$GLB,,, | Performed by: OBSTETRICS & GYNECOLOGY

## 2024-10-31 ENCOUNTER — LAB VISIT (OUTPATIENT)
Dept: LAB | Facility: OTHER | Age: 54
End: 2024-10-31
Attending: OBSTETRICS & GYNECOLOGY
Payer: COMMERCIAL

## 2024-10-31 DIAGNOSIS — N95.1 MENOPAUSAL SYMPTOM: ICD-10-CM

## 2024-10-31 LAB
ESTRADIOL SERPL-MCNC: <10 PG/ML
FINAL PATHOLOGIC DIAGNOSIS: NORMAL
GROSS: NORMAL
Lab: NORMAL
PROGEST SERPL-MCNC: 45.3 NG/ML

## 2024-10-31 PROCEDURE — 82670 ASSAY OF TOTAL ESTRADIOL: CPT | Performed by: OBSTETRICS & GYNECOLOGY

## 2024-10-31 PROCEDURE — 84144 ASSAY OF PROGESTERONE: CPT | Performed by: OBSTETRICS & GYNECOLOGY

## 2024-10-31 PROCEDURE — 82040 ASSAY OF SERUM ALBUMIN: CPT | Performed by: OBSTETRICS & GYNECOLOGY

## 2024-10-31 PROCEDURE — 36415 COLL VENOUS BLD VENIPUNCTURE: CPT | Performed by: OBSTETRICS & GYNECOLOGY

## 2024-11-03 PROBLEM — I50.32 HEART FAILURE WITH RECOVERED EJECTION FRACTION (HFRECEF): Status: ACTIVE | Noted: 2024-11-03

## 2024-11-03 PROBLEM — I42.8 NONISCHEMIC CARDIOMYOPATHY: Status: RESOLVED | Noted: 2022-05-13 | Resolved: 2024-11-03

## 2024-11-13 ENCOUNTER — TELEPHONE (OUTPATIENT)
Dept: OBSTETRICS AND GYNECOLOGY | Facility: CLINIC | Age: 54
End: 2024-11-13
Payer: COMMERCIAL

## 2024-11-13 DIAGNOSIS — N95.1 MENOPAUSAL SYMPTOM: Primary | ICD-10-CM

## 2024-11-13 DIAGNOSIS — N87.0 CIN I (CERVICAL INTRAEPITHELIAL NEOPLASIA I): ICD-10-CM

## 2024-11-13 RX ORDER — ESTRADIOL 0.04 MG/D
1 FILM, EXTENDED RELEASE TRANSDERMAL
Qty: 8 PATCH | Refills: 11 | Status: SHIPPED | OUTPATIENT
Start: 2024-11-14 | End: 2025-11-14

## 2024-11-13 RX ORDER — TESTOSTERONE CYPIONATE 200 MG/ML
38 INJECTION, SOLUTION INTRAMUSCULAR
Status: SHIPPED | OUTPATIENT
Start: 2024-11-14 | End: 2025-03-06

## 2024-11-14 NOTE — TELEPHONE ENCOUNTER
----- Message from Rachel Abad MD sent at 11/13/2024  5:23 PM CST -----  Schedule LEEP in January please   Please get her scheduled for Testsoterone injection soon when she is in town.   Thanks

## 2024-11-22 ENCOUNTER — CLINICAL SUPPORT (OUTPATIENT)
Dept: OBSTETRICS AND GYNECOLOGY | Facility: CLINIC | Age: 54
End: 2024-11-22
Payer: COMMERCIAL

## 2024-11-22 DIAGNOSIS — N95.1 MENOPAUSAL SYMPTOM: Primary | ICD-10-CM

## 2024-11-22 PROCEDURE — 99999 PR PBB SHADOW E&M-EST. PATIENT-LVL I: CPT | Mod: PBBFAC,,,

## 2024-11-22 RX ADMIN — TESTOSTERONE CYPIONATE 38 MG: 200 INJECTION, SOLUTION INTRAMUSCULAR at 02:11

## 2024-11-22 NOTE — PROGRESS NOTES
Here for hormone therapy injection, no complaints at this time, Injection given as ordered, tolerated well, no report of pain prior to or after injection. Return to clinic as scheduled.     Site - [facility]    Testosterone  38 mg # 1    Clinic Supplied Medication

## 2024-11-26 ENCOUNTER — PATIENT MESSAGE (OUTPATIENT)
Dept: OBSTETRICS AND GYNECOLOGY | Facility: CLINIC | Age: 54
End: 2024-11-26
Payer: COMMERCIAL

## 2024-11-26 ENCOUNTER — PATIENT MESSAGE (OUTPATIENT)
Dept: INTERNAL MEDICINE | Facility: CLINIC | Age: 54
End: 2024-11-26
Payer: COMMERCIAL

## 2024-12-09 RX ORDER — ESTRADIOL 0.04 MG/D
1 FILM, EXTENDED RELEASE TRANSDERMAL
Qty: 24 PATCH | Refills: 4 | Status: SHIPPED | OUTPATIENT
Start: 2024-12-09

## 2024-12-09 NOTE — TELEPHONE ENCOUNTER
Refill Routing Note   Medication(s) are not appropriate for processing by [facility] for the following reason(s):        Outside of protocol  Pharmacy is requesting the order be converted to dispense 90 day with refills    [facility] action(s):  Route        Medication Therapy Plan: Pharmacy has requested a 90-day order to dispense.      Appointments  past 12m or future 3m with PCP    Date Provider   Last Visit   10/29/2024 Rachel Abad MD   Next Visit   1/8/2025 Rachel Abad MD   ED visits in past 90 days: 0        Note composed:7:45 AM 12/09/2024

## 2025-01-07 ENCOUNTER — CLINICAL SUPPORT (OUTPATIENT)
Dept: INTERNAL MEDICINE | Facility: CLINIC | Age: 55
End: 2025-01-07
Payer: COMMERCIAL

## 2025-01-07 ENCOUNTER — OFFICE VISIT (OUTPATIENT)
Dept: INTERNAL MEDICINE | Facility: CLINIC | Age: 55
End: 2025-01-07
Payer: COMMERCIAL

## 2025-01-07 VITALS
OXYGEN SATURATION: 98 % | SYSTOLIC BLOOD PRESSURE: 106 MMHG | DIASTOLIC BLOOD PRESSURE: 68 MMHG | HEIGHT: 65 IN | HEART RATE: 61 BPM | WEIGHT: 149.69 LBS | BODY MASS INDEX: 24.94 KG/M2

## 2025-01-07 DIAGNOSIS — Z79.890 HORMONE REPLACEMENT THERAPY (HRT): ICD-10-CM

## 2025-01-07 DIAGNOSIS — Z00.00 ANNUAL PHYSICAL EXAM: Primary | ICD-10-CM

## 2025-01-07 DIAGNOSIS — Z86.73 HISTORY OF CEREBROVASCULAR ACCIDENT (CVA) FROM LEFT CAROTID ARTERY OCCLUSION INVOLVING LEFT MIDDLE CEREBRAL ARTERY TERRITORY: ICD-10-CM

## 2025-01-07 DIAGNOSIS — Z95.818 PRESENCE OF WATCHMAN LEFT ATRIAL APPENDAGE CLOSURE DEVICE: ICD-10-CM

## 2025-01-07 DIAGNOSIS — Z98.890 H/O CARDIAC RADIOFREQUENCY ABLATION: ICD-10-CM

## 2025-01-07 LAB
25(OH)D3+25(OH)D2 SERPL-MCNC: 83 NG/ML (ref 30–96)
ALBUMIN SERPL BCP-MCNC: 4 G/DL (ref 3.5–5.2)
ALP SERPL-CCNC: 91 U/L (ref 40–150)
ALT SERPL W/O P-5'-P-CCNC: 53 U/L (ref 10–44)
ANION GAP SERPL CALC-SCNC: 7 MMOL/L (ref 8–16)
AST SERPL-CCNC: 40 U/L (ref 10–40)
BASOPHILS # BLD AUTO: 0.05 K/UL (ref 0–0.2)
BASOPHILS NFR BLD: 0.8 % (ref 0–1.9)
BILIRUB SERPL-MCNC: 0.5 MG/DL (ref 0.1–1)
BUN SERPL-MCNC: 17 MG/DL (ref 6–20)
CALCIUM SERPL-MCNC: 9.6 MG/DL (ref 8.7–10.5)
CHLORIDE SERPL-SCNC: 107 MMOL/L (ref 95–110)
CHOLEST SERPL-MCNC: 181 MG/DL (ref 120–199)
CHOLEST/HDLC SERPL: 3 {RATIO} (ref 2–5)
CO2 SERPL-SCNC: 29 MMOL/L (ref 23–29)
CREAT SERPL-MCNC: 0.7 MG/DL (ref 0.5–1.4)
DIFFERENTIAL METHOD BLD: NORMAL
EOSINOPHIL # BLD AUTO: 0.1 K/UL (ref 0–0.5)
EOSINOPHIL NFR BLD: 1.7 % (ref 0–8)
ERYTHROCYTE [DISTWIDTH] IN BLOOD BY AUTOMATED COUNT: 12.4 % (ref 11.5–14.5)
EST. GFR  (NO RACE VARIABLE): >60 ML/MIN/1.73 M^2
ESTIMATED AVG GLUCOSE: 100 MG/DL (ref 68–131)
FERRITIN SERPL-MCNC: 109 NG/ML (ref 20–300)
GLUCOSE SERPL-MCNC: 80 MG/DL (ref 70–110)
HBA1C MFR BLD: 5.1 % (ref 4–5.6)
HCT VFR BLD AUTO: 41.6 % (ref 37–48.5)
HDLC SERPL-MCNC: 60 MG/DL (ref 40–75)
HDLC SERPL: 33.1 % (ref 20–50)
HGB BLD-MCNC: 13.5 G/DL (ref 12–16)
IMM GRANULOCYTES # BLD AUTO: 0.02 K/UL (ref 0–0.04)
IMM GRANULOCYTES NFR BLD AUTO: 0.3 % (ref 0–0.5)
IRON SERPL-MCNC: 101 UG/DL (ref 30–160)
LDLC SERPL CALC-MCNC: 107 MG/DL (ref 63–159)
LYMPHOCYTES # BLD AUTO: 1.3 K/UL (ref 1–4.8)
LYMPHOCYTES NFR BLD: 21.2 % (ref 18–48)
MCH RBC QN AUTO: 29.9 PG (ref 27–31)
MCHC RBC AUTO-ENTMCNC: 32.5 G/DL (ref 32–36)
MCV RBC AUTO: 92 FL (ref 82–98)
MONOCYTES # BLD AUTO: 0.5 K/UL (ref 0.3–1)
MONOCYTES NFR BLD: 8.2 % (ref 4–15)
NEUTROPHILS # BLD AUTO: 4.3 K/UL (ref 1.8–7.7)
NEUTROPHILS NFR BLD: 67.8 % (ref 38–73)
NONHDLC SERPL-MCNC: 121 MG/DL
NRBC BLD-RTO: 0 /100 WBC
PLATELET # BLD AUTO: 223 K/UL (ref 150–450)
PMV BLD AUTO: 10.9 FL (ref 9.2–12.9)
POTASSIUM SERPL-SCNC: 4.3 MMOL/L (ref 3.5–5.1)
PROT SERPL-MCNC: 7 G/DL (ref 6–8.4)
RBC # BLD AUTO: 4.51 M/UL (ref 4–5.4)
SATURATED IRON: 26 % (ref 20–50)
SODIUM SERPL-SCNC: 143 MMOL/L (ref 136–145)
TOTAL IRON BINDING CAPACITY: 391 UG/DL (ref 250–450)
TRANSFERRIN SERPL-MCNC: 264 MG/DL (ref 200–375)
TRIGL SERPL-MCNC: 70 MG/DL (ref 30–150)
TSH SERPL DL<=0.005 MIU/L-ACNC: 1.57 UIU/ML (ref 0.4–4)
VIT B12 SERPL-MCNC: >2000 PG/ML (ref 210–950)
WBC # BLD AUTO: 6.31 K/UL (ref 3.9–12.7)

## 2025-01-07 PROCEDURE — 3008F BODY MASS INDEX DOCD: CPT | Mod: CPTII,S$GLB,, | Performed by: INTERNAL MEDICINE

## 2025-01-07 PROCEDURE — 1160F RVW MEDS BY RX/DR IN RCRD: CPT | Mod: CPTII,S$GLB,, | Performed by: INTERNAL MEDICINE

## 2025-01-07 PROCEDURE — 82607 VITAMIN B-12: CPT | Performed by: INTERNAL MEDICINE

## 2025-01-07 PROCEDURE — 82728 ASSAY OF FERRITIN: CPT | Performed by: INTERNAL MEDICINE

## 2025-01-07 PROCEDURE — 84466 ASSAY OF TRANSFERRIN: CPT | Performed by: INTERNAL MEDICINE

## 2025-01-07 PROCEDURE — 3074F SYST BP LT 130 MM HG: CPT | Mod: CPTII,S$GLB,, | Performed by: INTERNAL MEDICINE

## 2025-01-07 PROCEDURE — 85025 COMPLETE CBC W/AUTO DIFF WBC: CPT | Performed by: INTERNAL MEDICINE

## 2025-01-07 PROCEDURE — 3078F DIAST BP <80 MM HG: CPT | Mod: CPTII,S$GLB,, | Performed by: INTERNAL MEDICINE

## 2025-01-07 PROCEDURE — 99396 PREV VISIT EST AGE 40-64: CPT | Mod: S$GLB,,, | Performed by: INTERNAL MEDICINE

## 2025-01-07 PROCEDURE — 99999 PR PBB SHADOW E&M-EST. PATIENT-LVL III: CPT | Mod: PBBFAC,,, | Performed by: INTERNAL MEDICINE

## 2025-01-07 PROCEDURE — 3044F HG A1C LEVEL LT 7.0%: CPT | Mod: CPTII,S$GLB,, | Performed by: INTERNAL MEDICINE

## 2025-01-07 PROCEDURE — 82306 VITAMIN D 25 HYDROXY: CPT | Performed by: INTERNAL MEDICINE

## 2025-01-07 PROCEDURE — 99999 PR PBB SHADOW E&M-EST. PATIENT-LVL I: CPT | Mod: PBBFAC,,,

## 2025-01-07 PROCEDURE — 80053 COMPREHEN METABOLIC PANEL: CPT | Performed by: INTERNAL MEDICINE

## 2025-01-07 PROCEDURE — 1159F MED LIST DOCD IN RCRD: CPT | Mod: CPTII,S$GLB,, | Performed by: INTERNAL MEDICINE

## 2025-01-07 PROCEDURE — 84443 ASSAY THYROID STIM HORMONE: CPT | Performed by: INTERNAL MEDICINE

## 2025-01-07 PROCEDURE — 83036 HEMOGLOBIN GLYCOSYLATED A1C: CPT | Performed by: INTERNAL MEDICINE

## 2025-01-07 PROCEDURE — 80061 LIPID PANEL: CPT | Performed by: INTERNAL MEDICINE

## 2025-01-07 RX ORDER — CLOPIDOGREL BISULFATE 75 MG/1
75 TABLET ORAL
COMMUNITY
Start: 2024-11-22 | End: 2025-01-07

## 2025-01-08 ENCOUNTER — TELEPHONE (OUTPATIENT)
Dept: INTERNAL MEDICINE | Facility: CLINIC | Age: 55
End: 2025-01-08
Payer: COMMERCIAL

## 2025-01-08 ENCOUNTER — ANESTHESIA EVENT (OUTPATIENT)
Dept: SURGERY | Facility: OTHER | Age: 55
End: 2025-01-08
Payer: COMMERCIAL

## 2025-01-08 ENCOUNTER — OFFICE VISIT (OUTPATIENT)
Dept: OBSTETRICS AND GYNECOLOGY | Facility: CLINIC | Age: 55
End: 2025-01-08
Attending: OBSTETRICS & GYNECOLOGY
Payer: COMMERCIAL

## 2025-01-08 ENCOUNTER — DOCUMENTATION ONLY (OUTPATIENT)
Dept: INTERNAL MEDICINE | Facility: CLINIC | Age: 55
End: 2025-01-08
Payer: COMMERCIAL

## 2025-01-08 ENCOUNTER — HOSPITAL ENCOUNTER (OUTPATIENT)
Dept: PREADMISSION TESTING | Facility: OTHER | Age: 55
Discharge: HOME | End: 2025-01-08
Attending: OBSTETRICS & GYNECOLOGY
Payer: COMMERCIAL

## 2025-01-08 VITALS
DIASTOLIC BLOOD PRESSURE: 69 MMHG | SYSTOLIC BLOOD PRESSURE: 102 MMHG | BODY MASS INDEX: 25.16 KG/M2 | HEART RATE: 60 BPM | HEIGHT: 65 IN | WEIGHT: 151 LBS

## 2025-01-08 VITALS
OXYGEN SATURATION: 98 % | HEIGHT: 65 IN | DIASTOLIC BLOOD PRESSURE: 59 MMHG | BODY MASS INDEX: 25.16 KG/M2 | SYSTOLIC BLOOD PRESSURE: 121 MMHG | RESPIRATION RATE: 16 BRPM | HEART RATE: 79 BPM | WEIGHT: 151 LBS | TEMPERATURE: 98 F

## 2025-01-08 DIAGNOSIS — N87.9 CERVICAL DYSPLASIA: Primary | ICD-10-CM

## 2025-01-08 LAB — CRC RECOMMENDATION EXT: NORMAL

## 2025-01-08 PROCEDURE — 3078F DIAST BP <80 MM HG: CPT | Mod: CPTII,S$GLB,, | Performed by: OBSTETRICS & GYNECOLOGY

## 2025-01-08 PROCEDURE — 99214 OFFICE O/P EST MOD 30 MIN: CPT | Mod: S$GLB,,, | Performed by: OBSTETRICS & GYNECOLOGY

## 2025-01-08 PROCEDURE — 3044F HG A1C LEVEL LT 7.0%: CPT | Mod: CPTII,S$GLB,, | Performed by: OBSTETRICS & GYNECOLOGY

## 2025-01-08 PROCEDURE — 3008F BODY MASS INDEX DOCD: CPT | Mod: CPTII,S$GLB,, | Performed by: OBSTETRICS & GYNECOLOGY

## 2025-01-08 PROCEDURE — 3074F SYST BP LT 130 MM HG: CPT | Mod: CPTII,S$GLB,, | Performed by: OBSTETRICS & GYNECOLOGY

## 2025-01-08 PROCEDURE — 99999 PR PBB SHADOW E&M-EST. PATIENT-LVL III: CPT | Mod: PBBFAC,,, | Performed by: OBSTETRICS & GYNECOLOGY

## 2025-01-08 RX ORDER — ACETAMINOPHEN 500 MG
1000 TABLET ORAL
OUTPATIENT
Start: 2025-01-08 | End: 2025-01-08

## 2025-01-08 RX ORDER — LIDOCAINE HYDROCHLORIDE 10 MG/ML
0.5 INJECTION, SOLUTION EPIDURAL; INFILTRATION; INTRACAUDAL; PERINEURAL ONCE
OUTPATIENT
Start: 2025-01-08 | End: 2025-01-08

## 2025-01-08 RX ORDER — SODIUM CHLORIDE, SODIUM LACTATE, POTASSIUM CHLORIDE, CALCIUM CHLORIDE 600; 310; 30; 20 MG/100ML; MG/100ML; MG/100ML; MG/100ML
INJECTION, SOLUTION INTRAVENOUS CONTINUOUS
OUTPATIENT
Start: 2025-01-08

## 2025-01-08 NOTE — PROGRESS NOTES
Subjective:       Patient ID: Maria Dolores Tamayo is a 54 y.o. female who presents today for:    Chief Complaint:   Chief Complaint   Patient presents with    Annual Exam       HPI:  History of Present Illness    CHIEF COMPLAINT:  Patient presents today for follow-up.    MENOPAUSAL SYMPTOMS:  She experiences warm flashes that have decreased in frequency, with none occurring this week.    CURRENT MEDICATIONS:  She has been on testosterone shots for many months with Dr. hayward and recently started estrogen patch since November, and is considering switching to testosterone cream as testosterone shots difficult for her.  She will see her gynecologist.  She has found some  She also takes baby aspirin and Mounjaro (Tirzepatide).    MEDICAL HISTORY:  She has a history of diverticulitis.  She has a history embolic stroke while on a trip when she went into atrial fibrillation with a rapid response.  She had tPA and had ventricular requiring the each to be coated.  Subsequently while on coagulation she had some vaginal bleeding will have colposcopy also at month she has a Watchman procedure in March of 2024 and is off anticoagulation aspirin.  She has also lost over 20 on Mounjaro injections.    SURGICAL HISTORY:  She has an upcoming colposcopy scheduled for the 22nd.    FAMILY HISTORY:  Her  has stage four colorectal cancer and underwent a liver resection almost three years ago. Her mother has progressive supranuclear palsy (PSP), currently in a wheelchair with difficulty speaking.      ROS:  General: -fever, -chills, -fatigue, -weight gain, -weight loss, -night sweats  Eyes: -vision changes, -redness, -discharge  ENT: -ear pain, -nasal congestion, -sore throat  Cardiovascular: -chest pain, -palpitations, -lower extremity edema  Respiratory: -cough, -shortness of breath  Gastrointestinal: -abdominal pain, -nausea, -vomiting, -diarrhea, -constipation, -blood in stool  Genitourinary: -dysuria, -hematuria,  "-frequency  Musculoskeletal: -joint pain, -muscle pain  Skin: -rash, -lesion  Neurological: -headache, -dizziness, -numbness, -tingling  Psychiatric: -anxiety, -depression, -sleep difficulty           Medications:  Outpatient Encounter Medications as of 1/7/2025   Medication Sig Dispense Refill    ASHWAGANDHA ROOT EXTRACT ORAL Take by mouth.      aspirin (ECOTRIN) 81 MG EC tablet Take 1 tablet (81 mg total) by mouth once daily.      ergocalciferol, vitamin D2, (VITAMIN D ORAL) Take by mouth.      estradioL (VIVELLE-DOT) 0.0375 mg/24 hr APPLY 1 PATCH TWICE A WEEK 24 patch 4    magnesium chloride (MAG 64 ORAL) Take by mouth.      MOUNJARO 10 mg/0.5 mL PnIj SMARTSIG:10 Milligram(s) SUB-Q Once a Week      prasterone, dhea, (INTRAROSA) 6.5 mg Inst Place 6.5 mg vaginally once daily. 30 each 11    progesterone (PROMETRIUM) 200 MG capsule Take 200 mg by mouth every evening. at bedtime      UNABLE TO FIND Nutrascripts pregenolone sr 100mg      UNABLE TO FIND Nutrascripts DHEA Sr 5 mg      venlafaxine (EFFEXOR-XR) 37.5 MG 24 hr capsule Take 37.5 mg by mouth.      [DISCONTINUED] clopidogreL (PLAVIX) 75 mg tablet Take 75 mg by mouth.      [DISCONTINUED] furosemide (LASIX) 20 MG tablet Take 1 tablet (20 mg total) by mouth daily as needed. edema (Patient not taking: Reported on 10/14/2024) 30 tablet 0     Facility-Administered Encounter Medications as of 1/7/2025   Medication Dose Route Frequency Provider Last Rate Last Admin    0.9%  NaCl infusion (for blood administration)   Intravenous Q24H PRN Manju Parikh NP        testosterone cypionate injection 38 mg  38 mg Intramuscular Q28 Days    38 mg at 11/22/24 1430       Allergies:  Review of patient's allergies indicates:  No Known Allergies        Objective:      Vital Signs  Pulse: 61  SpO2: 98 %  BP: 106/68  Patient Position: Sitting  Pain Score: 0-No pain  Height and Weight  Height: 5' 5" (165.1 cm)  Weight: 67.9 kg (149 lb 11.1 oz)  BSA (Calculated - sq m): 1.76 sq " meters  BMI (Calculated): 24.9  Weight in (lb) to have BMI = 25: 149.9]    Physical Exam   Physical Exam    General: No acute distress. Well-developed. Well-nourished.  Eyes: EOMI. Sclerae anicteric.  HENT: Normocephalic. Atraumatic.  Cardiovascular: Regular rate. Regular rhythm. No murmurs. No rubs. No gallops. Normal S1, S2.  Respiratory: Normal respiratory effort. Clear to auscultation bilaterally. No rales. No rhonchi. No wheezing.  Abdomen: Soft. Non-tender. Non-distended. Normoactive bowel sounds.  Musculoskeletal: No  obvious deformity.  Extremities: No lower extremity edema.  Neurological: Alert & oriented x3. No slurred speech. Normal gait.  Psychiatric: Normal mood. Normal affect. Good insight. Good judgment.  Skin: Warm. Dry. No rash.        Assessment/plan:     Maria Dolores Tamayo is a 54 y.o.female here for an ANNUAL PHYSICAL EXAM:    Health Maintenance:  Health Maintenance   Topic Date Due    HIV Screening  Never done    TETANUS VACCINE  Never done    High Dose Statin  Never done    Colorectal Cancer Screening  Never done    Shingles Vaccine (1 of 2) Never done    Pneumococcal Vaccines (Age 50+) (1 of 1 - PCV) Never done    Influenza Vaccine (1) Never done    COVID-19 Vaccine (5 - 2024-25 season) 09/01/2024    Mammogram  05/30/2025    Cervical Cancer Screening  10/14/2029    Lipid Panel  01/07/2030    RSV Vaccine (Age 60+ and Pregnant patients) (1 - 1-dose 75+ series) 02/04/2044    Hepatitis C Screening  Completed      Maria Dolores has a history of :  History of cerebrovascular accident (CVA) from left carotid artery occlusion involving left middle cerebral artery territory- 4/2022 s/p TPA and secondary to Patrial fib   then  H/O cardiac radiofrequency ablation- 10/2022  Then   Presence of Watchman left atrial appendage closure device- 3/2024      Assessment & Plan    IMPRESSION:  - Considering continuation of hormone therapy, weighing benefits against potential need to pause for upcoming surgery  -  Assessing effectiveness of current hormone regimen, noting minimal hot flashes and considering adjustments for libido concerns  - Evaluating need for preventive care, including mammogram, colorectal screening, and bone density scan    HORMONE REPLACEMENT THERAPY:  - Discussed the expected timeline for noticing effects of estrogen therapy, typically around 3 months.  - Explained potential side effects of testosterone therapy to monitor, including alopecia and acne.  - Continued estrogen patch and Tirzepatide (Mounjaro).  - Discussed options for testosterone administration: 1. Continue injectable form with flexibility for nurse administration during visits to [place]. 2. Consider alternative forms such as transdermal cream for inner thigh or sublingual formulation if injections become inconvenient.  - Patient reports some improvement in symptoms, with fewer hot flashes.  - Offered to continue testosterone injections and coordinate with Dr. Greenwood.  - Advised continuing estrogen patch and monitoring for improvements.  - Patient reports experiencing hot flashes, which have recently decreased in frequency.  - Continued hormone replacement therapy, including estrogen patch and testosterone.    BREAST CANCER SCREENING:  - Ordered a mammogram.  - Noted the patient's last mammogram was in May.  - Recommend getting a mammogram.    DIVERTICULITIS:  - Noted the patient's history of diverticulitis.    COLON CANCER SCREENING:  - Plan to obtain previous colonoscopy records.  - Noted the patient had a previous colonoscopy pre-COVID with no polyps found.  - Will obtain consent and retrieve records of previous colonoscopy.  - Will check when the next colonoscopy is due.    STROKE PREVENTION:  - Confirmed continuation of low-dose aspirin.  - Continued low-dose aspirin due to history of stroke.    VAGINAL DRYNESS:  - Continued Estradiol cream as needed for vaginal dryness.    INFLUENZA VACCINATION:  - Informed about the  prevalence and severity of influenza this season, distinguishing it from milder COVID-19 symptoms.  - Patient to consider getting influenza vaccination before [place] season.    FOLLOW UP:  - Follow up to review lab results once available.  - Contact the office if any questions or concerns arise.        Future Appointments   Date Time Provider Department Center   1/8/2025  9:30 AM [facility] Abad MD [facility]   1/8/2025 10:30 [facility]     This note was generated with the assistance of ambient listening technology. Verbal consent was obtained by the patient and accompanying visitor(s) for the recording of patient appointment to facilitate this note. I attest to having reviewed and edited the generated note for accuracy, though some syntax or spelling errors may persist. Please contact the author of this note for any clarification.     Rebecca Yoder MD  [facility]

## 2025-01-08 NOTE — TELEPHONE ENCOUNTER
, I forgot to get her to sign it says for her outside copy of her colonoscopy which appear we showed no polyps but had diverticulosis in his was done in [place] with dr devan johnston ; FAX [phone]

## 2025-01-08 NOTE — DISCHARGE INSTRUCTIONS
Information to Prepare you for your Surgery    PRE-ADMIT TESTING   [address]  [place] [address]  [address]   [phone]  - KENDY Neri    Your surgery has been scheduled at [facility]. We are pleased to have the opportunity to serve you. For Further Information please call [phone].    On the day of surgery please report to Registration on the 1st floor of the [facility].    CONTACT YOUR PHYSICIAN'S OFFICE THE DAY PRIOR TO YOUR SURGERY TO OBTAIN YOUR ARRIVAL TIME.     The evening before surgery do not eat anything after 9 p.m. ( this includes hard candy, chewing gum and mints).  You may only have GATORADE, POWERADE AND WATER  from 9 p.m. until you leave your home.     DRINK AT LEAST 12 OUNCES THE MORNING OF SURGERY    DO NOT DRINK ANY LIQUIDS ON THE WAY TO THE HOSPITAL.      Why does your anesthesiologist allow you to drink Gatorade/Powerade before surgery?    Gatorade/Powerade helps to increase your comfort before surgery and to decrease your nausea after surgery.  The carbohydrates in the Gatorade/Powerade help reduce your body's stress response to surgery.  If you are diabetic, drink only water prior to surgery.       Outpatient Surgery- May allow 2 adults (18 and older)/ Support Persons (1 being the designated ) for all surgical/procedural patients. A breastfeeding mother will be allowed her infant and 2 adult Support Persons. No one under the age of 18 will be allowed in the building.    MEDICATION INSTRUCTIONS: TAKE medications checked off by the Anesthesiologist on your Medication List.      Angiogram Patients: Take medications as instructed by your physician, including aspirin.     Surgery Patients:  If you take ASPIRIN - Your PHYSICIAN/SURGEON will need to inform you IF/OR when you need to stop taking aspirin prior to your surgery.     Starting the week prior to surgery, do not take any medications containing IBUPROFEN or NSAIDS (Advil,  Aleve, BC, Celebrex, Goody's, Ketorolac, Meloxicam, Mobic, Motrin, Naproxen, Toradol, etc).  If you are not sure if you should take a medicine please call your surgeon's office.  You may take Tylenol.    Do Not Wear any make-up (especially eye make-up) to surgery. Please remove any false eyelashes or eyelash extensions. If you arrive the day of surgery with makeup/eyelashes on you will be required to remove prior to surgery. (There is a risk of corneal abrasions if eye makeup/eyelash extensions are not removed)    Leave all valuables at home.   Do Not wear any jewelry or watches, including any metal in body piercings. Jewelry must be removed prior to coming to the hospital.  There is a possibility that rings that are unable to be removed may be cut off if they are on the surgical extremity.    Please remove all hair extensions, wigs, clips and any other metal accessories/ ornaments from your hair.  These items may pose a flammable/fire risk in Surgery and must be removed.    Do not shave your surgical area at least 5 days prior to your surgery. The surgical prep will be performed at the hospital according to Infection Control regulations.    Contact Lens must be removed before surgery. Either do not wear the contact lens or bring a case and solution for storage.  Please bring a container for eyeglasses or dentures as required.  Bring any paperwork your physician has provided, such as consent forms,  history and physicals, doctor's orders, etc.   Bring comfortable clothes that are loose fitting to wear upon discharge. Take into consideration the type of surgery being performed.  Maintain your diet as advised per your physician the day prior to surgery.    Adequate rest the night before surgery is advised.   Park in the Parking lot behind the hospital or in the [employer] [facility] across the street from the parking lot. Parking is complimentary.  If you will be discharged the same day as your procedure, please  arrange for a responsible adult to drive you home or to accompany you if traveling by taxi.   YOU WILL NOT BE PERMITTED TO DRIVE OR TO LEAVE THE HOSPITAL ALONE AFTER SURGERY.   If you are being discharged the same day, it is strongly recommended that you arrange for someone to remain with you for the first 24 hrs following your surgery.    The Surgeon will speak to your family/visitor after your surgery regarding the outcome of your surgery and post op care.  The Surgeon may speak to you after your surgery, but there is a possibility you may not remember the details.  Please check with your family members regarding the conversation with the Surgeon.         Bathing Instructions with Hibiclens:    Shower the evening before and morning of your procedure with Chlorhexidine (Hibiclens)  do not use Chlorhexidine on your face or genitals. Do not get in your eyes.  Wash your face with water and your regular face wash/soap  Use your regular shampoo  Apply Chlorhexidine (Hibiclens) directly on your skin or on a wet washcloth and wash gently. When showering: Move away from the shower stream when applying Chlorhexidine (Hibiclens) to avoid rinsing off too soon.  Rinse thoroughly with warm water  Do not dilute Chlorhexidine (Hibiclens)   Dry off as usual, do not use any deodorant, powder, body lotions, perfume, after shave or cologne.     We strongly recommend whoever is bringing you home be present for discharge instructions.  This will ensure a thorough understanding for your post op home care.    If the patient has fever, cough, or signs/symptoms of Flu or Covid please do not come in for your surgery.  First, contact the pre op department at [phone] (unit opens at 5AM) and then contact your surgeon and your primary care physician for further instructions.      If applicable, please bring any blood pressure and/or diabetes medications with you the day of surgery.  If on home oxygen, even at night, please bring your  portable oxygen tank with you the day of surgery in case it is needed for your discharge.      Thanks,  KENDY Neri

## 2025-01-12 DIAGNOSIS — R74.01 ELEVATED ALT MEASUREMENT: Primary | ICD-10-CM

## 2025-01-18 RX ORDER — FAMOTIDINE 20 MG/1
20 TABLET, FILM COATED ORAL
Status: CANCELLED | OUTPATIENT
Start: 2025-01-18

## 2025-01-18 RX ORDER — SODIUM CHLORIDE 9 MG/ML
INJECTION, SOLUTION INTRAVENOUS CONTINUOUS
Status: CANCELLED | OUTPATIENT
Start: 2025-01-18

## 2025-01-18 NOTE — PROGRESS NOTES
History & Physical            OBGYN    C.C Pre-op Exam      HPI : Maria Dolores Tamayo is a 54 y.o. female  for evaluation and discussion of treatment options for Pre-op Exam   She has persistent cervical dysplasia. Unable to see transformation zone. 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     Past Medical History:   Diagnosis Date    Abnormal Pap smear of cervix     Anemia- vaginal bleeding while on eliquis     Atrial fibrillation     Encounter for blood transfusion     Heart murmur     atrial fibrillation    Stroke- embolic Left MCA from atrial fib      Past Surgical History:   Procedure Laterality Date    ABLATION OF ARRHYTHMOGENIC FOCUS FOR ATRIAL FIBRILLATION N/A 10/05/2022    Procedure: Ablation atrial fibrillation;  Surgeon: PLACIDO Cervantes MD;  Location: [facility] EP LAB;  Service: Cardiology;  Laterality: N/A;  AF, ROWDY, PVI, RFA, Carto, Gen, EH, 3 Prep    AUGMENTATION OF BREAST      COLPOSCOPY      COSMETIC SURGERY      ECHOCARDIOGRAM,TRANSESOPHAGEAL N/A 2024    Procedure: Transesophageal echo (ROWDY) intra-procedure log documentation;  Surgeon: Shane Kothari III, MD;  Location: [facility] EP LAB;  Service: Cardiology;  Laterality: N/A;  AF, ROWDY/Watchman, BosSci, Gen, EH, 3prep    ECHOCARDIOGRAM,TRANSESOPHAGEAL N/A 2024    Procedure: Transesophageal echo (ROWDY) intra-procedure log documentation;  Surgeon: Sky, Brooklynn Diagnostic;  Location: [facility] EP LAB;  Service: Cardiology;  Laterality: N/A;  AF/6 wk post Watchman, ROWDY, MAC. EH, 3prep    OCCLUSION OF LEFT ATRIAL APPENDAGE N/A 2024    Procedure: Left atrial appendage occlusion;  Surgeon: PLACIDO Cervantes MD;  Location: [facility] EP LAB;  Service: Cardiology;  Laterality: N/A;  AF, ROWDY/Watchman, BosSci, Gen, EH, 3prep    UTERINE ARTERY EMBOLIZATION      watchman       Family History   Problem Relation Name Age of Onset    Diabetes Paternal Grandmother          prediabetic    Arthritis Father 79     Progressive Supranuclear Palsy Mother 78 in  " 73    Cancer Maternal Aunt mom          ovarian    Ovarian cancer Maternal Aunt mom  60    Breast cancer Neg Hx      Colon cancer Neg Hx      Uterine cancer Neg Hx      Cervical cancer Neg Hx       Social History     Tobacco Use    Smoking status: Never    Smokeless tobacco: Never   Substance Use Topics    Alcohol use: Not Currently     Comment: rare    Drug use: No     OB History    Para Term  AB Living   1 1 0         SAB IAB Ectopic Multiple Live Births                  # Outcome Date GA Lbr Nima/2nd Weight Sex Type Anes PTL Lv   1 Para      Vag-Spont          /69   Pulse 60   Ht 5' 5" (1.651 m)   Wt 68.5 kg (151 lb)   BMI 25.13 kg/m²     ROS:    GENERAL: Denies weight gain or weight loss. Feeling well overall.   SKIN: Denies rash or lesions.   HEAD: Denies head injury or headache.   NODES: Denies enlarged lymph nodes.   CHEST: Denies chest pain or shortness of breath.   CARDIOVASCULAR: Denies palpitations or left sided chest pain.   ABDOMEN: No abdominal pain, constipation, diarrhea, nausea, vomiting or rectal bleeding.   URINARY: No frequency, dysuria, hematuria, or burning on urination.  REPRODUCTIVE: See HPI.   BREASTS: The patient performs breast self-examination and denies pain, lumps, or nipple discharge.   HEMATOLOGIC: No easy bruisability or excessive bleeding with the exception of menstrual cycles.  MUSCULOSKELETAL: Denies joint pain or swelling.   NEUROLOGIC: Denies syncope or weakness.   PSYCHIATRIC: Denies depression, anxiety or mood swings.    PHYSICAL EXAM:    APPEARANCE: Well nourished, well developed, in no acute distress.  AFFECT: WNL, alert and oriented x 3  SKIN: No acne or hirsutism  NECK: Neck symmetric without masses or thyromegaly  NODES: No inguinal, cervical, axillary, or femoral lymph node enlargement  CHEST: Good respiratory effect  ABDOMEN: Soft.  No tenderness or masses.  No hepatosplenomegaly.  No hernias.  PELVIC: Normal external genitalia without " lesions.  Normal hair distribution.  Adequate perineal body, normal urethral meatus.  Vagina moist and well rugated without lesions or discharge.  Cervix pink, without lesions, discharge or tenderness.  No significant cystocele or rectocele.  Bimanual exam shows uterus to be normal size, regular, mobile and nontender.  Adnexa without masses or tenderness.    EXTREMITIES: No edema.    ASSESSMENT & PLAN:    1. Cervical dysplasia (Primary)        I have discussed the risks, benefits, indications, and alternatives of the procedure in detail.  The patient verbalizes her understanding.  All questions answered.  Consents signed.  The patient agrees to proceed to proceed as planned.

## 2025-01-18 NOTE — H&P (VIEW-ONLY)
History & Physical            OBGYN    C.C Pre-op Exam      HPI : Maria Dolores Tamayo is a 54 y.o. female  for evaluation and discussion of treatment options for Pre-op Exam   She has persistent cervical dysplasia. Unable to see transformation zone. 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     Past Medical History:   Diagnosis Date    Abnormal Pap smear of cervix     Anemia- vaginal bleeding while on eliquis     Atrial fibrillation     Encounter for blood transfusion     Heart murmur     atrial fibrillation    Stroke- embolic Left MCA from atrial fib      Past Surgical History:   Procedure Laterality Date    ABLATION OF ARRHYTHMOGENIC FOCUS FOR ATRIAL FIBRILLATION N/A 10/05/2022    Procedure: Ablation atrial fibrillation;  Surgeon: PLACIDO Cervantes MD;  Location: [facility] EP LAB;  Service: Cardiology;  Laterality: N/A;  AF, ROWDY, PVI, RFA, Carto, Gen, EH, 3 Prep    AUGMENTATION OF BREAST      COLPOSCOPY      COSMETIC SURGERY      ECHOCARDIOGRAM,TRANSESOPHAGEAL N/A 2024    Procedure: Transesophageal echo (ROWDY) intra-procedure log documentation;  Surgeon: Shane Kothrai III, MD;  Location: [facility] EP LAB;  Service: Cardiology;  Laterality: N/A;  AF, ROWDY/Watchman, BosSci, Gen, EH, 3prep    ECHOCARDIOGRAM,TRANSESOPHAGEAL N/A 2024    Procedure: Transesophageal echo (ROWDY) intra-procedure log documentation;  Surgeon: Sky, Brooklynn Diagnostic;  Location: [facility] EP LAB;  Service: Cardiology;  Laterality: N/A;  AF/6 wk post Watchman, ROWDY, MAC. EH, 3prep    OCCLUSION OF LEFT ATRIAL APPENDAGE N/A 2024    Procedure: Left atrial appendage occlusion;  Surgeon: PLACIDO Cervantes MD;  Location: [facility] EP LAB;  Service: Cardiology;  Laterality: N/A;  AF, ROWDY/Watchman, BosSci, Gen, EH, 3prep    UTERINE ARTERY EMBOLIZATION      watchman       Family History   Problem Relation Name Age of Onset    Diabetes Paternal Grandmother          prediabetic    Arthritis Father 79     Progressive Supranuclear Palsy Mother 78 in  " 73    Cancer Maternal Aunt mom          ovarian    Ovarian cancer Maternal Aunt mom  60    Breast cancer Neg Hx      Colon cancer Neg Hx      Uterine cancer Neg Hx      Cervical cancer Neg Hx       Social History     Tobacco Use    Smoking status: Never    Smokeless tobacco: Never   Substance Use Topics    Alcohol use: Not Currently     Comment: rare    Drug use: No     OB History    Para Term  AB Living   1 1 0         SAB IAB Ectopic Multiple Live Births                  # Outcome Date GA Lbr Nima/2nd Weight Sex Type Anes PTL Lv   1 Para      Vag-Spont          /69   Pulse 60   Ht 5' 5" (1.651 m)   Wt 68.5 kg (151 lb)   BMI 25.13 kg/m²     ROS:    GENERAL: Denies weight gain or weight loss. Feeling well overall.   SKIN: Denies rash or lesions.   HEAD: Denies head injury or headache.   NODES: Denies enlarged lymph nodes.   CHEST: Denies chest pain or shortness of breath.   CARDIOVASCULAR: Denies palpitations or left sided chest pain.   ABDOMEN: No abdominal pain, constipation, diarrhea, nausea, vomiting or rectal bleeding.   URINARY: No frequency, dysuria, hematuria, or burning on urination.  REPRODUCTIVE: See HPI.   BREASTS: The patient performs breast self-examination and denies pain, lumps, or nipple discharge.   HEMATOLOGIC: No easy bruisability or excessive bleeding with the exception of menstrual cycles.  MUSCULOSKELETAL: Denies joint pain or swelling.   NEUROLOGIC: Denies syncope or weakness.   PSYCHIATRIC: Denies depression, anxiety or mood swings.    PHYSICAL EXAM:    APPEARANCE: Well nourished, well developed, in no acute distress.  AFFECT: WNL, alert and oriented x 3  SKIN: No acne or hirsutism  NECK: Neck symmetric without masses or thyromegaly  NODES: No inguinal, cervical, axillary, or femoral lymph node enlargement  CHEST: Good respiratory effect  ABDOMEN: Soft.  No tenderness or masses.  No hepatosplenomegaly.  No hernias.  PELVIC: Normal external genitalia without " lesions.  Normal hair distribution.  Adequate perineal body, normal urethral meatus.  Vagina moist and well rugated without lesions or discharge.  Cervix pink, without lesions, discharge or tenderness.  No significant cystocele or rectocele.  Bimanual exam shows uterus to be normal size, regular, mobile and nontender.  Adnexa without masses or tenderness.    EXTREMITIES: No edema.    ASSESSMENT & PLAN:    1. Cervical dysplasia (Primary)        I have discussed the risks, benefits, indications, and alternatives of the procedure in detail.  The patient verbalizes her understanding.  All questions answered.  Consents signed.  The patient agrees to proceed to proceed as planned.

## 2025-01-29 ENCOUNTER — ANESTHESIA (OUTPATIENT)
Dept: SURGERY | Facility: OTHER | Age: 55
End: 2025-01-29
Payer: COMMERCIAL

## 2025-01-29 ENCOUNTER — HOSPITAL ENCOUNTER (OUTPATIENT)
Facility: OTHER | Age: 55
Discharge: HOME | End: 2025-01-29
Attending: OBSTETRICS & GYNECOLOGY | Admitting: OBSTETRICS & GYNECOLOGY
Payer: COMMERCIAL

## 2025-01-29 VITALS
HEART RATE: 53 BPM | DIASTOLIC BLOOD PRESSURE: 56 MMHG | RESPIRATION RATE: 16 BRPM | OXYGEN SATURATION: 99 % | TEMPERATURE: 98 F | HEIGHT: 65 IN | BODY MASS INDEX: 25.16 KG/M2 | SYSTOLIC BLOOD PRESSURE: 100 MMHG | WEIGHT: 151 LBS

## 2025-01-29 DIAGNOSIS — N87.9 CERVICAL DYSPLASIA: ICD-10-CM

## 2025-01-29 DIAGNOSIS — Z98.890 S/P LEEP: Primary | ICD-10-CM

## 2025-01-29 PROCEDURE — 63600175 PHARM REV CODE 636 W HCPCS: Performed by: ANESTHESIOLOGY

## 2025-01-29 PROCEDURE — 88307 TISSUE EXAM BY PATHOLOGIST: CPT | Mod: 59 | Performed by: STUDENT IN AN ORGANIZED HEALTH CARE EDUCATION/TRAINING PROGRAM

## 2025-01-29 PROCEDURE — 25000003 PHARM REV CODE 250: Performed by: ANESTHESIOLOGY

## 2025-01-29 PROCEDURE — D9220A PRA ANESTHESIA: Mod: ANES,,, | Performed by: ANESTHESIOLOGY

## 2025-01-29 PROCEDURE — 88307 TISSUE EXAM BY PATHOLOGIST: CPT | Mod: 26,,, | Performed by: STUDENT IN AN ORGANIZED HEALTH CARE EDUCATION/TRAINING PROGRAM

## 2025-01-29 PROCEDURE — 36000706: Performed by: OBSTETRICS & GYNECOLOGY

## 2025-01-29 PROCEDURE — 88305 TISSUE EXAM BY PATHOLOGIST: CPT | Mod: 26,,, | Performed by: STUDENT IN AN ORGANIZED HEALTH CARE EDUCATION/TRAINING PROGRAM

## 2025-01-29 PROCEDURE — 36000707: Performed by: OBSTETRICS & GYNECOLOGY

## 2025-01-29 PROCEDURE — 71000016 HC POSTOP RECOV ADDL HR: Performed by: OBSTETRICS & GYNECOLOGY

## 2025-01-29 PROCEDURE — 57522 CONIZATION OF CERVIX: CPT | Mod: ,,, | Performed by: OBSTETRICS & GYNECOLOGY

## 2025-01-29 PROCEDURE — 88305 TISSUE EXAM BY PATHOLOGIST: CPT | Performed by: STUDENT IN AN ORGANIZED HEALTH CARE EDUCATION/TRAINING PROGRAM

## 2025-01-29 PROCEDURE — 25000003 PHARM REV CODE 250: Performed by: OBSTETRICS & GYNECOLOGY

## 2025-01-29 PROCEDURE — 37000009 HC ANESTHESIA EA ADD 15 MINS: Performed by: OBSTETRICS & GYNECOLOGY

## 2025-01-29 PROCEDURE — 37000008 HC ANESTHESIA 1ST 15 MINUTES: Performed by: OBSTETRICS & GYNECOLOGY

## 2025-01-29 PROCEDURE — 71000033 HC RECOVERY, INTIAL HOUR: Performed by: OBSTETRICS & GYNECOLOGY

## 2025-01-29 PROCEDURE — 71000015 HC POSTOP RECOV 1ST HR: Performed by: OBSTETRICS & GYNECOLOGY

## 2025-01-29 PROCEDURE — D9220A PRA ANESTHESIA: Mod: CRNA,,, | Performed by: NURSE ANESTHETIST, CERTIFIED REGISTERED

## 2025-01-29 PROCEDURE — 63600175 PHARM REV CODE 636 W HCPCS: Performed by: NURSE ANESTHETIST, CERTIFIED REGISTERED

## 2025-01-29 RX ORDER — SODIUM CHLORIDE 9 MG/ML
INJECTION, SOLUTION INTRAVENOUS CONTINUOUS
Status: DISCONTINUED | OUTPATIENT
Start: 2025-01-29 | End: 2025-01-29 | Stop reason: HOSPADM

## 2025-01-29 RX ORDER — GLUCAGON 1 MG
1 KIT INJECTION
Status: DISCONTINUED | OUTPATIENT
Start: 2025-01-29 | End: 2025-01-29 | Stop reason: HOSPADM

## 2025-01-29 RX ORDER — LIDOCAINE HYDROCHLORIDE 20 MG/ML
INJECTION INTRAVENOUS
Status: DISCONTINUED | OUTPATIENT
Start: 2025-01-29 | End: 2025-01-29

## 2025-01-29 RX ORDER — FAMOTIDINE 20 MG/1
20 TABLET, FILM COATED ORAL
Status: COMPLETED | OUTPATIENT
Start: 2025-01-29 | End: 2025-01-29

## 2025-01-29 RX ORDER — DEXAMETHASONE SODIUM PHOSPHATE 4 MG/ML
INJECTION, SOLUTION INTRA-ARTICULAR; INTRALESIONAL; INTRAMUSCULAR; INTRAVENOUS; SOFT TISSUE
Status: DISCONTINUED | OUTPATIENT
Start: 2025-01-29 | End: 2025-01-29

## 2025-01-29 RX ORDER — KETOROLAC TROMETHAMINE 30 MG/ML
INJECTION, SOLUTION INTRAMUSCULAR; INTRAVENOUS
Status: DISCONTINUED | OUTPATIENT
Start: 2025-01-29 | End: 2025-01-29

## 2025-01-29 RX ORDER — IBUPROFEN 600 MG/1
600 TABLET ORAL EVERY 6 HOURS
Qty: 30 TABLET | Refills: 1 | Status: SHIPPED | OUTPATIENT
Start: 2025-01-29

## 2025-01-29 RX ORDER — FENTANYL CITRATE 50 UG/ML
INJECTION, SOLUTION INTRAMUSCULAR; INTRAVENOUS
Status: DISCONTINUED | OUTPATIENT
Start: 2025-01-29 | End: 2025-01-29

## 2025-01-29 RX ORDER — PROCHLORPERAZINE EDISYLATE 5 MG/ML
5 INJECTION INTRAMUSCULAR; INTRAVENOUS EVERY 30 MIN PRN
Status: DISCONTINUED | OUTPATIENT
Start: 2025-01-29 | End: 2025-01-29 | Stop reason: HOSPADM

## 2025-01-29 RX ORDER — OXYCODONE HYDROCHLORIDE 5 MG/1
5 TABLET ORAL
Status: DISCONTINUED | OUTPATIENT
Start: 2025-01-29 | End: 2025-01-29 | Stop reason: HOSPADM

## 2025-01-29 RX ORDER — ONDANSETRON HYDROCHLORIDE 2 MG/ML
4 INJECTION, SOLUTION INTRAVENOUS EVERY 4 HOURS PRN
Status: DISCONTINUED | OUTPATIENT
Start: 2025-01-29 | End: 2025-01-29 | Stop reason: HOSPADM

## 2025-01-29 RX ORDER — KETOROLAC TROMETHAMINE 30 MG/ML
15 INJECTION, SOLUTION INTRAMUSCULAR; INTRAVENOUS EVERY 6 HOURS PRN
Status: DISCONTINUED | OUTPATIENT
Start: 2025-01-29 | End: 2025-01-29 | Stop reason: HOSPADM

## 2025-01-29 RX ORDER — SODIUM CHLORIDE 0.9 % (FLUSH) 0.9 %
3 SYRINGE (ML) INJECTION
Status: DISCONTINUED | OUTPATIENT
Start: 2025-01-29 | End: 2025-01-29 | Stop reason: HOSPADM

## 2025-01-29 RX ORDER — PHENYLEPHRINE HYDROCHLORIDE 10 MG/ML
INJECTION INTRAVENOUS
Status: DISCONTINUED | OUTPATIENT
Start: 2025-01-29 | End: 2025-01-29

## 2025-01-29 RX ORDER — ONDANSETRON HYDROCHLORIDE 2 MG/ML
INJECTION, SOLUTION INTRAVENOUS
Status: DISCONTINUED | OUTPATIENT
Start: 2025-01-29 | End: 2025-01-29

## 2025-01-29 RX ORDER — PROPOFOL 10 MG/ML
VIAL (ML) INTRAVENOUS
Status: DISCONTINUED | OUTPATIENT
Start: 2025-01-29 | End: 2025-01-29

## 2025-01-29 RX ORDER — ACETAMINOPHEN 500 MG
1000 TABLET ORAL
Status: COMPLETED | OUTPATIENT
Start: 2025-01-29 | End: 2025-01-29

## 2025-01-29 RX ORDER — ACETAMINOPHEN 500 MG
1000 TABLET ORAL EVERY 6 HOURS PRN
Status: DISCONTINUED | OUTPATIENT
Start: 2025-01-29 | End: 2025-01-29 | Stop reason: HOSPADM

## 2025-01-29 RX ORDER — DEXTROMETHORPHAN HYDROBROMIDE, GUAIFENESIN 5; 100 MG/5ML; MG/5ML
650 LIQUID ORAL EVERY 6 HOURS
Qty: 30 TABLET | Refills: 1 | Status: SHIPPED | OUTPATIENT
Start: 2025-01-29

## 2025-01-29 RX ORDER — HYDROMORPHONE HYDROCHLORIDE 2 MG/ML
0.4 INJECTION, SOLUTION INTRAMUSCULAR; INTRAVENOUS; SUBCUTANEOUS EVERY 5 MIN PRN
Status: DISCONTINUED | OUTPATIENT
Start: 2025-01-29 | End: 2025-01-29 | Stop reason: HOSPADM

## 2025-01-29 RX ORDER — SODIUM CHLORIDE, SODIUM LACTATE, POTASSIUM CHLORIDE, CALCIUM CHLORIDE 600; 310; 30; 20 MG/100ML; MG/100ML; MG/100ML; MG/100ML
INJECTION, SOLUTION INTRAVENOUS CONTINUOUS
Status: DISCONTINUED | OUTPATIENT
Start: 2025-01-29 | End: 2025-01-29 | Stop reason: HOSPADM

## 2025-01-29 RX ORDER — LIDOCAINE HYDROCHLORIDE 10 MG/ML
0.5 INJECTION, SOLUTION EPIDURAL; INFILTRATION; INTRACAUDAL; PERINEURAL ONCE
Status: DISCONTINUED | OUTPATIENT
Start: 2025-01-29 | End: 2025-01-29 | Stop reason: HOSPADM

## 2025-01-29 RX ORDER — HYDROMORPHONE HYDROCHLORIDE 2 MG/ML
0.2 INJECTION, SOLUTION INTRAMUSCULAR; INTRAVENOUS; SUBCUTANEOUS
Status: DISCONTINUED | OUTPATIENT
Start: 2025-01-29 | End: 2025-01-29 | Stop reason: HOSPADM

## 2025-01-29 RX ADMIN — DEXAMETHASONE SODIUM PHOSPHATE 8 MG: 4 INJECTION, SOLUTION INTRAMUSCULAR; INTRAVENOUS at 07:01

## 2025-01-29 RX ADMIN — FAMOTIDINE 20 MG: 20 TABLET, FILM COATED ORAL at 05:01

## 2025-01-29 RX ADMIN — LIDOCAINE HYDROCHLORIDE 50 MG: 20 INJECTION, SOLUTION INTRAVENOUS at 07:01

## 2025-01-29 RX ADMIN — PHENYLEPHRINE HYDROCHLORIDE 100 MCG: 10 INJECTION INTRAVENOUS at 07:01

## 2025-01-29 RX ADMIN — ACETAMINOPHEN 1000 MG: 500 TABLET, FILM COATED ORAL at 05:01

## 2025-01-29 RX ADMIN — KETOROLAC TROMETHAMINE 30 MG: 30 INJECTION, SOLUTION INTRAMUSCULAR; INTRAVENOUS at 07:01

## 2025-01-29 RX ADMIN — ONDANSETRON HYDROCHLORIDE 4 MG: 2 INJECTION INTRAMUSCULAR; INTRAVENOUS at 07:01

## 2025-01-29 RX ADMIN — PROPOFOL 200 MG: 10 INJECTION, EMULSION INTRAVENOUS at 07:01

## 2025-01-29 RX ADMIN — GLYCOPYRROLATE 0.2 MG: 0.2 INJECTION, SOLUTION INTRAMUSCULAR; INTRAVITREAL at 07:01

## 2025-01-29 RX ADMIN — FENTANYL CITRATE 100 MCG: 50 INJECTION, SOLUTION INTRAMUSCULAR; INTRAVENOUS at 07:01

## 2025-01-29 RX ADMIN — SODIUM CHLORIDE, SODIUM LACTATE, POTASSIUM CHLORIDE, AND CALCIUM CHLORIDE: 600; 310; 30; 20 INJECTION, SOLUTION INTRAVENOUS at 06:01

## 2025-01-29 NOTE — INTERVAL H&P NOTE
Maria Dolores Tamayo is 54 y.o.  presenting for scheduled LEEP.    Temp:  [98.6 °F (37 °C)] 98.6 °F (37 °C)  Pulse:  [56] 56  Resp:  [20] 20  SpO2:  [99 %] 99 %  BP: (106)/(57) 106/57    General: NAD, alert, oriented, cooperative  HEENT: NCAT, EOM grossly intact  Lungs: Normal WOB  Heart: regular rate  Abdomen: soft, nondistended, nontender, no rebound or guarding    Consents in chart. Pre-operative heparin not indicated. All questions answered and concerns addressed. To OR for planned procedure.    The patient has been examined and the H&P has been reviewed:    I concur with the findings and no changes have occurred since H&P was written.    Surgery risks, benefits and alternative options discussed and understood by patient/family.    Lia Aviles MD  [facility]   OBGYN PGY-1

## 2025-01-29 NOTE — DISCHARGE SUMMARY
[facility] - Surgery ([place])  Brief Operative Note    Surgery Date: 1/29/2025     Surgeons and Role:     * Rachel Abad MD - Primary    Assisting Surgeon: None    Pre-op Diagnosis:  LOUIE I (cervical intraepithelial neoplasia I) [N87.0]    Post-op Diagnosis:  Post-Op Diagnosis Codes:     * LOUIE I (cervical intraepithelial neoplasia I) [N87.0]    Procedure(s) (LRB):  COLPOSCOPY, WITH LEEP OF CERVIX (N/A)    Anesthesia: General    Operative Findings: See Operative Report    Estimated Blood Loss: * No values recorded between 1/29/2025 12:00 AM and 1/29/2025  6:34 AM *         Specimens:   Specimen (24h ago, onward)       Start     Ordered    01/29/25 0726  Specimen to Pathology, Surgery Gynecology and Obstetrics  Once        Comments: Pre-op Diagnosis: LOUIE I (cervical intraepithelial neoplasia I) [N87.0]Procedure(s):COLPOSCOPY, WITH LEEP OF CERVIX Number of specimens: 4Name of specimens: #1 Ectocervical biopsy tagged at 12#2 Ectocervical biopsy tagged at 3#3 posterior leep tagged at 7#4 Posterior Leep ECC     References:    Click here for ordering Quick Tip   Question Answer Comment   Procedure Type: Gynecology and Obstetrics    Specimen Class: Known or suspected malignancy    Release to patient Immediate        01/29/25 0732                  Discharge Note    OUTCOME: Patient tolerated treatment/procedure well without complication and is now ready for discharge.    DISPOSITION: Home or Self Care    FINAL DIAGNOSIS:  S/P LEEP    FOLLOWUP: In clinic    DISCHARGE INSTRUCTIONS:    Discharge Procedure Orders   Diet general     Lifting restrictions   Order Comments: LIFTING:  No lifting greater than 15 pounds for six weeks.     PELVIC REST:  No douching, tampons, or intercourse for 6 weeks.  If prescribed, vaginal estrogen cream may be used during the postoperative period.     Other restrictions (specify):   Order Comments: DRIVING:  No driving while on narcotics. Driving may be resumed initially with a competent  passenger one to two weeks after surgery if no longer taking narcotics.     EXERCISE:  For six weeks your exercise should be limited to walking. You may walk as far as you wish, as long as you increase your level of exertion gradually and avoid slippery surfaces. You may climb stairs as needed to get around, but should not use stair climbing for exercise.     Leave dressing on - Keep it clean, dry, and intact until clinic visit     Remove dressing in 24 hours   Order Comments: If you have a bandage on wound, you may remove it the day after dismissal.  If you had steri-strips remove them once they begin to peel off (usually 2 weeks). Keep incision clean and dry.  Inspect the incision daily for signs and symptoms of infection.     Call MD for:  temperature >100.4     Call MD for:  persistent nausea and vomiting     Call MD for:  severe uncontrolled pain     Call MD for:  difficulty breathing, headache or visual disturbances     Call MD for:  redness, tenderness, or signs of infection (pain, swelling, redness, odor or green/yellow discharge around incision site)     Call MD for:  hives     Call MD for:   Order Comments: inability to void,urine is ketchup colored or you have large clots, vaginal bleeding is heavier than a period.    VAGINAL DISCHARGE: You may develop a vaginal discharge and intermittent vaginal spotting after surgery and up to 6 weeks postoperatively.  The discharge may have an odor and may change in color but it is normal.  This is due to dissolving stiches.  Contact your surgical team if you develop vaginal or vulvar irritation along with a discharge.  Also contact your surgical team if you have vaginal discharge that smells like urine or stool.    PAIN MEDICATIONS:     Take your pain medications as instructed. It is best to take pain medications before your pain becomes severe. This will allow you to take less medication yet have better pain relief. For the first 2 or 3 days it may be helpful to take  your pain medications on a regular schedule (e.g. every 4 to 6 hours). This will help you to keep your pain under better control. You should then begin to take fewer medications each day until you no longer need them. Do not take pain medication on an empty stomach. This may lead to nausea and vomiting.    CONSTIPATION REMEDIES: Patients are often constipated after surgery or with use of oral narcotic medicine. You should continue to take the stool softener, Senokot-S during the next six weeks, and consume adequate amounts of water.  If you have not had a bowel movement for 3 days after dismissal, or are uncomfortable and unable to pass stool, please try one or all of the following measures:  1.  Milk of Magnesia - 30 cc by mouth every 12 hours   2.  Dulcolax suppository - One suppository per rectum every 4-6 hours   3.  Metamucil, Fibercon or other bulk former - use as directed  4.  Fleets Enema  5.  Prunes or Prune juice    If you continue to have constipation after trying the above remedies, you should contact your surgical team using the contact information listed above     Activity as tolerated   Order Comments: Return to normal activity slowly as you feel able.  For 6 weeks your exercise should be limited to walking.  You may walk as far as you wish, as long as you increase your level of exertion gradually and avoid slippery surfaces.     Shower on day dressing removed (No bath)   Order Comments: You may shower at any time but should avoid immersing any abdominal incisions in water for at least 2 weeks after surgery or until the wound is completely healed.  If given, please shower with Hibaclens soap until bottle is completely finish        Medication List        START taking these medications      acetaminophen 650 MG Tbsr  Commonly known as: TYLENOL  Take 1 tablet (650 mg total) by mouth every 6 (six) hours.     ibuprofen 600 MG tablet  Commonly known as: ADVIL,MOTRIN  Take 1 tablet (600 mg total) by mouth  every 6 (six) hours.            CONTINUE taking these medications      ASHWAGANDHA ROOT EXTRACT ORAL     aspirin 81 MG EC tablet  Commonly known as: ECOTRIN  Take 1 tablet (81 mg total) by mouth once daily.     estradioL 0.0375 mg/24 hr  Commonly known as: VIVELLE-DOT  APPLY 1 PATCH TWICE A WEEK     INTRAROSA 6.5 mg Inst  Generic drug: prasterone (dhea)  Place 6.5 mg vaginally once daily.     MAG 64 ORAL     MOUNJARO 10 mg/0.5 mL Pnij  Generic drug: tirzepatide     progesterone 200 MG capsule  Commonly known as: PROMETRIUM     UNABLE TO FIND     UNABLE TO FIND     venlafaxine 37.5 MG 24 hr capsule  Commonly known as: EFFEXOR-XR     VITAMIN D ORAL               Where to Get Your Medications        These medications were sent to [facility]  [address] [address]      Hours: Mon-Fri, 8a-5:30p Phone: [phone]   acetaminophen 650 MG Tbsr  ibuprofen 600 MG tablet       Lia Aviles MD  [facility]   OBGYN PGY-1

## 2025-01-29 NOTE — PLAN OF CARE
Maria Dolorescolleen Rutherford Tamayo has met all discharge criteria from Phase II. Vital Signs are stable, ambulating  without difficulty. Discharge instructions given, patient verbalized understanding. Discharged from facility via wheelchair in stable condition.

## 2025-01-29 NOTE — OP NOTE
OPERATIVE NOTE    DATE OF PROCEDURE: 01/29/2025    SURGEON: Rachel Abad MD     ASSISTANT: Lia Aviles MD PGY1    PREOPERATIVE DIAGNOSIS:   1. LOUIE 1    POSTOPERATIVE DIAGNOSIS:   1. LOUIE 1  2. s/p LEEP     PROCEDURE: LEEP of cervix    ANESTHESIA: general    FINDINGS: Cervix did not pull. LEEP performed with entire transformation zone removed in 3 passes. Post-LEEP ECC also performed.    ESTIMATED BLOOD LOSS:  minimal    URINE OUTPUT: 30 mL drained via in-and-out catheterization prior to procedure     IV FLUIDS: See anesthesia records      SPECIMENS:  LEEP cervical biopsy - stitch at 12 o'clock  LEEP cervical biopsy - stitch at 6 o'clock  LEEP cervical biopsy - at 7 o'clock  Posterior Leep   Post Leep ECC    PROCEDURE IN DETAIL:   Patient was taken to the operating room where general anesthesia was administered and found to be adequate.  She was placed in the dorsal lithotomy position using yellow fin stirrups, then prepped and draped in the usual sterile fashion. Bladder was drained via in-and-out catheterization prior to procedure.  A surgical timeout was performed with patient's name, date of birth, procedure to be performed, and allergies verbalized. All OR staff in agreement. No preoperative antibiotics were administered as none were indicated.    Attention was then turned to the vagina where a coated speculum was placed in the vagina to visualize the cervix.  The anterior lip of the cervix was grasped with a single tooth tenaculum, cervix did not pull. The appropriate size loop was selected. The ectocervix and transformation zone were removed in three passes. Post-LEEP ECC was then performed. All specimens were sent to pathology for review. Excellent Hemostasis was obtained at the base using monopolar cautery.  Sponge and instrument counts were correct x 2. The patient tolerated the procedure well and was awakened without difficulty. She was taken to the recovery room in stable  condition.    Lia Aviles MD  [facility]   OBGYN PGY-1

## 2025-01-29 NOTE — TRANSFER OF CARE
"Anesthesia Transfer of Care Note    Patient: Maria Dolores Tamayo    Procedure(s) Performed: Procedure(s) (LRB):  COLPOSCOPY, WITH LEEP OF CERVIX (N/A)    Patient location: PACU    Anesthesia Type: general    Transport from OR: Transported from OR on 6-10 L/min O2 by face mask with adequate spontaneous ventilation    Post pain: adequate analgesia    Post assessment: no apparent anesthetic complications    Post vital signs: stable    Level of consciousness: awake    Nausea/Vomiting: no nausea/vomiting    Complications: none    Transfer of care protocol was followed      Last vitals: Visit Vitals  BP (!) 106/57 (BP Location: Left arm, Patient Position: Lying)   Pulse (!) 56   Temp 37 °C (98.6 °F) (Axillary)   Resp 20   Ht 5' 5" (1.651 m)   Wt 68.5 kg (151 lb 0.2 oz)   SpO2 99%   Breastfeeding No   BMI 25.13 kg/m²     "

## 2025-01-29 NOTE — ANESTHESIA POSTPROCEDURE EVALUATION
Anesthesia Post Evaluation    Patient: Maria Dolores Tamayo    Procedure(s) Performed: Procedure(s) (LRB):  COLPOSCOPY, WITH LEEP OF CERVIX (N/A)    Final Anesthesia Type: general      Patient location during evaluation: PACU  Patient participation: Yes- Able to Participate  Level of consciousness: awake and alert  Post-procedure vital signs: reviewed and stable  Pain management: adequate  Airway patency: patent    PONV status at discharge: No PONV  Anesthetic complications: no      Cardiovascular status: blood pressure returned to baseline  Respiratory status: unassisted  Hydration status: euvolemic  Follow-up not needed.              Vitals Value Taken Time   BP 98/52 01/29/25 0806   Temp 36.4 °C (97.6 °F) 01/29/25 0806   Pulse 54 01/29/25 0816   Resp 12 01/29/25 0817   SpO2 97 % 01/29/25 0816   Vitals shown include unfiled device data.      No case tracking events are documented in the log.      Pain/Angus Score: Pain Rating Prior to Med Admin: 0 (1/29/2025  5:42 AM)  Angus Score: 9 (1/29/2025  8:07 AM)

## 2025-01-29 NOTE — PLAN OF CARE
Patient requests the presence of her  at the time of discharge and for transportation to the home setting.

## 2025-01-29 NOTE — ANESTHESIA PROCEDURE NOTES
Intubation    Date/Time: 1/29/2025 7:02 AM    Performed by: Yuko Hidalgo CRNA  Authorized by: Shahram Davies MD    Intubation:     Induction:  Intravenous    Intubated:  Postinduction    Mask Ventilation:  N/a    Attempts:  1    Attempted By:  CRNA    Difficult Airway Encountered?: No      Complications:  None    Airway Device:  Supraglottic airway/LMA (igel)    Airway Device Size:  4.0    Style/Cuff Inflation:  Cuffed (inflated to minimal occlusive pressure)    Secured at:  The lips    Placement Verified By:  Capnometry    Complicating Factors:  None    Findings Post-Intubation:  BS equal bilateral and atraumatic/condition of teeth unchanged

## 2025-01-30 NOTE — PATIENT INSTRUCTIONS
10/23/22 is time for 6 month ultrasound of the gallbladder polyp OR see a general surgeon by that time for decion about gallbladder coming ou  
No indicators present

## 2025-01-31 LAB
FINAL PATHOLOGIC DIAGNOSIS: NORMAL
GROSS: NORMAL
Lab: NORMAL

## 2025-02-17 ENCOUNTER — PATIENT MESSAGE (OUTPATIENT)
Dept: ADMINISTRATIVE | Facility: HOSPITAL | Age: 55
End: 2025-02-17
Payer: COMMERCIAL

## 2025-03-14 ENCOUNTER — PATIENT MESSAGE (OUTPATIENT)
Dept: INTERNAL MEDICINE | Facility: CLINIC | Age: 55
End: 2025-03-14
Payer: COMMERCIAL

## 2025-03-19 ENCOUNTER — CLINICAL SUPPORT (OUTPATIENT)
Dept: INTERNAL MEDICINE | Facility: CLINIC | Age: 55
End: 2025-03-19
Payer: COMMERCIAL

## 2025-03-19 DIAGNOSIS — R74.01 ELEVATED ALT MEASUREMENT: ICD-10-CM

## 2025-03-19 LAB
ALBUMIN SERPL BCP-MCNC: 4.5 G/DL (ref 3.5–5.2)
ALP SERPL-CCNC: 110 U/L (ref 40–150)
ALT SERPL W/O P-5'-P-CCNC: 44 U/L (ref 10–44)
ANION GAP SERPL CALC-SCNC: 10 MMOL/L (ref 8–16)
AST SERPL-CCNC: 23 U/L (ref 10–40)
BILIRUB SERPL-MCNC: 0.7 MG/DL (ref 0.1–1)
BUN SERPL-MCNC: 12 MG/DL (ref 6–20)
CALCIUM SERPL-MCNC: 9.8 MG/DL (ref 8.7–10.5)
CHLORIDE SERPL-SCNC: 106 MMOL/L (ref 95–110)
CO2 SERPL-SCNC: 27 MMOL/L (ref 23–29)
CREAT SERPL-MCNC: 0.8 MG/DL (ref 0.5–1.4)
EST. GFR  (NO RACE VARIABLE): >60 ML/MIN/1.73 M^2
GLUCOSE SERPL-MCNC: 85 MG/DL (ref 70–110)
POTASSIUM SERPL-SCNC: 4.3 MMOL/L (ref 3.5–5.1)
PROT SERPL-MCNC: 8 G/DL (ref 6–8.4)
SODIUM SERPL-SCNC: 143 MMOL/L (ref 136–145)

## 2025-03-19 PROCEDURE — 80053 COMPREHEN METABOLIC PANEL: CPT | Performed by: INTERNAL MEDICINE

## 2025-03-19 PROCEDURE — 99999 PR PBB SHADOW E&M-EST. PATIENT-LVL I: CPT | Mod: PBBFAC,,,

## 2025-03-24 ENCOUNTER — RESULTS FOLLOW-UP (OUTPATIENT)
Dept: INTERNAL MEDICINE | Facility: CLINIC | Age: 55
End: 2025-03-24

## 2025-03-28 RX ORDER — PROGESTERONE 200 MG/1
200 CAPSULE ORAL NIGHTLY
Qty: 90 CAPSULE | Refills: 1 | Status: SHIPPED | OUTPATIENT
Start: 2025-03-28

## 2025-03-28 NOTE — TELEPHONE ENCOUNTER
No care due was identified.  [facility] Embedded Care Due Messages. Reference number: [id number].   3/28/2025 10:00:57 AM CDT

## 2025-03-30 RX ORDER — VALACYCLOVIR HYDROCHLORIDE 1 G/1
1000 TABLET, FILM COATED ORAL 3 TIMES DAILY
Qty: 21 TABLET | Refills: 0 | Status: SHIPPED | OUTPATIENT
Start: 2025-03-30 | End: 2025-04-06

## 2025-05-28 DIAGNOSIS — B02.8 HERPES ZOSTER WITH COMPLICATION: Primary | ICD-10-CM

## 2025-05-28 RX ORDER — VALACYCLOVIR HYDROCHLORIDE 1 G/1
1000 TABLET, FILM COATED ORAL 3 TIMES DAILY
Qty: 21 TABLET | Refills: 0 | Status: SHIPPED | OUTPATIENT
Start: 2025-05-28 | End: 2025-06-04

## 2025-06-09 ENCOUNTER — HOSPITAL ENCOUNTER (OUTPATIENT)
Dept: RADIOLOGY | Facility: OTHER | Age: 55
Discharge: HOME | End: 2025-06-09
Attending: OBSTETRICS & GYNECOLOGY
Payer: COMMERCIAL

## 2025-06-09 DIAGNOSIS — Z12.31 VISIT FOR SCREENING MAMMOGRAM: ICD-10-CM

## 2025-06-09 PROCEDURE — 77067 SCR MAMMO BI INCL CAD: CPT | Mod: TC

## 2025-06-09 PROCEDURE — 77063 BREAST TOMOSYNTHESIS BI: CPT | Mod: 26,,, | Performed by: RADIOLOGY

## 2025-06-09 PROCEDURE — 77067 SCR MAMMO BI INCL CAD: CPT | Mod: 26,,, | Performed by: RADIOLOGY

## 2025-06-13 ENCOUNTER — RESULTS FOLLOW-UP (OUTPATIENT)
Dept: OBSTETRICS AND GYNECOLOGY | Facility: CLINIC | Age: 55
End: 2025-06-13

## 2025-07-05 NOTE — TELEPHONE ENCOUNTER
No care due was identified.  [facility] Embedded Care Due Messages. Reference number: [id number].   7/05/2025 2:16:49 PM CDT

## 2025-07-07 RX ORDER — PROGESTERONE 200 MG/1
200 CAPSULE ORAL NIGHTLY
Qty: 90 CAPSULE | Refills: 1 | Status: SHIPPED | OUTPATIENT
Start: 2025-07-07

## 2025-07-07 NOTE — TELEPHONE ENCOUNTER
Maria Dolores Tamayo  is requesting a refill authorization.  Brief Assessment and Rationale for Refill:  Approve     Medication Therapy Plan:         Comments:     Note composed:2:03 PM 07/07/2025

## (undated) DEVICE — GUIDEWIRE AMPLATZ .035INX180X7

## (undated) DEVICE — NDL NRG TRNSPTAL 71CM

## (undated) DEVICE — COVER DRAPE ACUSON STERILE

## (undated) DEVICE — Device

## (undated) DEVICE — PACK EP DRAPE OMC

## (undated) DEVICE — SHEATH HEMOSTASIS 8.5FR

## (undated) DEVICE — GUIDEWIRE EMERALD 150CM PTFE

## (undated) DEVICE — GLOVE BIOGEL SKINSENSE PI 6.5

## (undated) DEVICE — SOL POVIDONE PREP IODINE 4 OZ

## (undated) DEVICE — PATCH CARTO REFERENCE

## (undated) DEVICE — ELECTRODE REM PLYHSV RETURN 9

## (undated) DEVICE — SHEATH BRITE TIP 9F 35CM

## (undated) DEVICE — SPIKE SHORT LG BORE 1-WAY 2IN

## (undated) DEVICE — SHEATH BRITE TIP 7FR 35CM

## (undated) DEVICE — SOL IRR SOD CHL .9% POUR

## (undated) DEVICE — SUT 2/0 30IN SILK BLK BRAI

## (undated) DEVICE — SYS WATCHMAN FXD CURVE DBL US

## (undated) DEVICE — SET HBE EXT CARESITE FILTER

## (undated) DEVICE — ELECTRODE LOOP 15X12X11 TNGSTN

## (undated) DEVICE — KIT PROBE COVER WITH GEL

## (undated) DEVICE — PAD DEFIB CADENCE ADULT R2

## (undated) DEVICE — INTRODUCER PRELUDESNAP 7F 13CM

## (undated) DEVICE — KIT CUSTOM MANIFOLD

## (undated) DEVICE — JELLY SURGILUBE 5GR

## (undated) DEVICE — CATH SOUNDSTAR ECO SMS 8FR

## (undated) DEVICE — DIALATOR COONS TAPER 12F 20CM

## (undated) DEVICE — ELECTRODE BALL RED 5MM

## (undated) DEVICE — BOWL FLUID - BACK STOP

## (undated) DEVICE — SET INTRO PERFRMR SHTH 16FR

## (undated) DEVICE — DRESSING TELFA N ADH 3X8

## (undated) DEVICE — INTRO FAST-CATH SL1 8.5FR 63CM

## (undated) DEVICE — DILATOR COONS TAPER 14FR

## (undated) DEVICE — STOPCOCK 3-WAY

## (undated) DEVICE — SET SMARTABLATE IRR TUBE

## (undated) DEVICE — CONNECTOR TUBING STR 5 IN 1

## (undated) DEVICE — COVER TABLE 44X90 STERILE

## (undated) DEVICE — SHEATH CARTO VIZIGO MED 22MM

## (undated) DEVICE — CATH THERMOCOOL SMTCH SF D F

## (undated) DEVICE — SOL POVIDONE SCRUB IODINE 4 OZ

## (undated) DEVICE — CATH PENTARY F 2-6-2MM 115CM

## (undated) DEVICE — PAD PREP CUFFED NS 24X48IN

## (undated) DEVICE — TUBING SMOKE EVACUATOR LEEP

## (undated) DEVICE — PENCIL ELECTROSURG HOLST W/BLD

## (undated) DEVICE — SWAB PROCTO 16 X 1 1/2 ST 2/PK

## (undated) DEVICE — CATH ANGIO DIAG PIG 6FX110

## (undated) DEVICE — CATH DCAPLR STEER LG 6FR 2-5-2